# Patient Record
Sex: FEMALE | Race: BLACK OR AFRICAN AMERICAN | Employment: PART TIME | URBAN - METROPOLITAN AREA
[De-identification: names, ages, dates, MRNs, and addresses within clinical notes are randomized per-mention and may not be internally consistent; named-entity substitution may affect disease eponyms.]

---

## 2017-03-20 ENCOUNTER — DOCUMENTATION ONLY (OUTPATIENT)
Dept: INTERNAL MEDICINE CLINIC | Age: 67
End: 2017-03-20

## 2017-03-20 NOTE — PROGRESS NOTES
Medicare Part B Preventive Services  Limitations  Recommendation  Scheduled    Bone Mass Measurement  (age 72 & older, biennial)  Requires diagnosis related to osteoporosis or estrogen deficiency. Biennial benefit unless patient has history of long-term glucocorticoid tx or baseline is needed because initial test was by other method  Completed 5/11/16 and showed osteopenia     Recommended every 2 years  Due 5/2018   Cardiovascular Screening Blood Tests (every 5 years)  Total cholesterol, HDL, Triglycerides  Order as a panel if possible  Completed 9/16    Recommended annually  Due 9/2017   Colorectal Cancer Screening  -Fecal occult blood test (annual)  -Flexible sigmoidoscopy (5y)  -Screening colonoscopy (10y)  -Barium Enema     Completed 4/10/13 with Dr. Jez Yang    Recommended every 5 years   Due 4/2018    Counseling to Prevent Tobacco Use (up to 8 sessions per year)  - Counseling greater than 3 and up to 10 minutes  - Counseling greater than 10 minutes  Patients must be asymptomatic of tobacco-related conditions to receive as preventive service  N/A  N/A    Diabetes Screening Tests (at least every 3 years, Medicare covers annually or at 6-month intervals for prediabetic patients)     Fasting blood sugar (FBS) or glucose tolerance test (GTT)  Patient must be diagnosed with one of the following:  -Hypertension, Dyslipidemia, obesity, previous impaired FBS or GTT  Or any two of the following: overweight, FH of diabetes, age ? 72, history of gestational diabetes, birth of baby weighing more than 9 pounds  Completed a1c 5.3- normal in 9/16    Typically checked annually Due 9/2017   Diabetes Self-Management Training (DSMT) (no USPSTF recommendation)  Requires referral by treating physician for patient with diabetes or renal disease.  10 hours of initial DSMT session of no less than 30 minutes each in a continuous 12-month period.  2 hours of follow-up DSMT in subsequent years.  N/A  N/A    Glaucoma Screening (no USPSTF recommendation)  Diabetes mellitus, family history, , age 48 or over,  American, age 72 or over  Completed 11/2015 Recommended annually  Due now   Human Immunodeficiency Virus (HIV) Screening (annually for increased risk patients)  HIV-1 and HIV-2 by EIA, KAMARI, rapid antibody test, or oral mucosa transudate  Patient must be at increased risk for HIV infection per USPSTF guidelines or pregnant.  Tests covered annually for patients at increased risk.  Pregnant patients may receive up to 3 test during pregnancy.  N/A  N/A    Medical Nutrition Therapy (MNT) (for diabetes or renal disease not recommended schedule)  Requires referral by treating physician for patient with diabetes or renal disease.  Can be provided in same year as diabetes self-management training (DSMT), and CMS recommends medical nutrition therapy take place after DSMT.  Up to 3 hours for initial year and 2 hours in subsequent years.  N/A  N/A    Prostate Cancer Screening (annually up to age 76)  - Digital rectal exam (BENJY)  - Prostate specific antigen (PSA)  Annually (age 48 or over), BENJY not paid separately when covered E/M service is provided on same date  N/A  N/A    Seasonal Influenza Vaccination (annually)     Recommended annually    You declined this vaccine today   Pneumococcal Vaccination (once after 65)     Completed 8/13/2014  Recommended once over the age of 77     [id number]: recommended once over the age of 72 Complete           Due now    Hepatitis B Vaccinations (if medium/high risk)  Medium/high risk factors:  End-stage renal disease,  Hemophiliacs who received Factor VIII or IX concentrates, Clients of institutions for the mentally retarded, Persons who live in the same house as a HepB virus carrier, Homosexual men, Illicit injectable drug abusers.  N/A  N/A    Screening Mammography (biennial age 50-72)?  Annually (age 36 or over)  Completed 3/07/16 at [facility] and negative     Due now; ordered    Screening Pap Tests and Pelvic Examination (up to age 79 and after 79 if unknown history or abnormal study last 10 years)  Every 24 months except high risk  Completed 4/06/16 with  [address]    Recommended every other year  Due 4/2018   Ultrasound Screening for Abdominal Aortic Aneurysm (AAA) (once)  Patient must be referred through IPPE and not have had a screening for abdominal aortic aneurysm before under Medicare.  Limited to patients who meet one of the following criteria:  - Men who are 73-68 years old and have smoked more than 100 cigarettes in their lifetime.  -Anyone with a FH of AAA  -Anyone recommended for screening by USPSTF  N/A  N/A    Family Practice Management 2011

## 2017-03-22 ENCOUNTER — OFFICE VISIT (OUTPATIENT)
Dept: INTERNAL MEDICINE CLINIC | Age: 67
End: 2017-03-22

## 2017-03-22 VITALS
HEIGHT: 60 IN | HEART RATE: 70 BPM | DIASTOLIC BLOOD PRESSURE: 64 MMHG | WEIGHT: 130 LBS | TEMPERATURE: 97.5 F | OXYGEN SATURATION: 97 % | BODY MASS INDEX: 25.52 KG/M2 | SYSTOLIC BLOOD PRESSURE: 104 MMHG

## 2017-03-22 DIAGNOSIS — Z12.39 BREAST SCREENING: ICD-10-CM

## 2017-03-22 DIAGNOSIS — E78.5 DYSLIPIDEMIA: ICD-10-CM

## 2017-03-22 DIAGNOSIS — R73.01 IFG (IMPAIRED FASTING GLUCOSE): ICD-10-CM

## 2017-03-22 DIAGNOSIS — Z13.39 SCREENING FOR ALCOHOLISM: ICD-10-CM

## 2017-03-22 DIAGNOSIS — E55.9 VITAMIN D DEFICIENCY: ICD-10-CM

## 2017-03-22 DIAGNOSIS — Z00.00 ROUTINE GENERAL MEDICAL EXAMINATION AT A HEALTH CARE FACILITY: ICD-10-CM

## 2017-03-22 DIAGNOSIS — M85.80 OSTEOPENIA: ICD-10-CM

## 2017-03-22 DIAGNOSIS — I10 ESSENTIAL HYPERTENSION: Primary | ICD-10-CM

## 2017-03-22 NOTE — PATIENT INSTRUCTIONS
Medicare Part B Preventive Services  Limitations  Recommendation  Scheduled    Bone Mass Measurement  (age 72 & older, biennial)  Requires diagnosis related to osteoporosis or estrogen deficiency. Biennial benefit unless patient has history of long-term glucocorticoid tx or baseline is needed because initial test was by other method  Completed 5/11/16 and showed osteopenia      Recommended every 2 years  Due 5/2018   Cardiovascular Screening Blood Tests (every 5 years)  Total cholesterol, HDL, Triglycerides  Order as a panel if possible  Completed 9/16     Recommended annually  Due 9/2017   Colorectal Cancer Screening  -Fecal occult blood test (annual)  -Flexible sigmoidoscopy (5y)  -Screening colonoscopy (10y)  -Barium Enema     Completed 4/10/13 with Dr. Alex Fink     Recommended every 5 years   Due 4/2018    Counseling to Prevent Tobacco Use (up to 8 sessions per year)  - Counseling greater than 3 and up to 10 minutes  - Counseling greater than 10 minutes  Patients must be asymptomatic of tobacco-related conditions to receive as preventive service  N/A  N/A    Diabetes Screening Tests (at least every 3 years, Medicare covers annually or at 6-month intervals for prediabetic patients)      Fasting blood sugar (FBS) or glucose tolerance test (GTT)  Patient must be diagnosed with one of the following:  -Hypertension, Dyslipidemia, obesity, previous impaired FBS or GTT  Or any two of the following: overweight, FH of diabetes, age ? 72, history of gestational diabetes, birth of baby weighing more than 9 pounds  Completed a1c 5.3- normal in 9/16     Typically checked annually Due 9/2017   Diabetes Self-Management Training (DSMT) (no USPSTF recommendation)  Requires referral by treating physician for patient with diabetes or renal disease.  10 hours of initial DSMT session of no less than 30 minutes each in a continuous 12-month period.  2 hours of follow-up DSMT in subsequent years.  N/A  N/A    Glaucoma Screening (no USPSTF recommendation)  Diabetes mellitus, family history, , age 48 or over,  American, age 72 or over  Completed 2016?  Recommended annually  Due now--please find out information about your last eye exam    Human Immunodeficiency Virus (HIV) Screening (annually for increased risk patients)  HIV-1 and HIV-2 by EIA, KAMARI, rapid antibody test, or oral mucosa transudate  Patient must be at increased risk for HIV infection per USPSTF guidelines or pregnant.  Tests covered annually for patients at increased risk.  Pregnant patients may receive up to 3 test during pregnancy.  N/A  N/A    Medical Nutrition Therapy (MNT) (for diabetes or renal disease not recommended schedule)  Requires referral by treating physician for patient with diabetes or renal disease.  Can be provided in same year as diabetes self-management training (DSMT), and CMS recommends medical nutrition therapy take place after DSMT.  Up to 3 hours for initial year and 2 hours in subsequent years.  N/A  N/A    Prostate Cancer Screening (annually up to age 76)  - Digital rectal exam (BENJY)  - Prostate specific antigen (PSA)  Annually (age 48 or over), BENJY not paid separately when covered E/M service is provided on same date  N/A  N/A    Seasonal Influenza Vaccination (annually)     Recommended annually 9/16 Annually in the fall   Pneumococcal Vaccination (once after 65)     Completed 8/13/2014  Recommended once over the age of 77      [id number]: recommended once over the age of 72 Complete             Declines currently    Hepatitis B Vaccinations (if medium/high risk)  Medium/high risk factors:  End-stage renal disease,  Hemophiliacs who received Factor VIII or IX concentrates, Clients of institutions for the mentally retarded, Persons who live in the same house as a HepB virus carrier, Homosexual men, Illicit injectable drug abusers.  N/A  N/A    Screening Mammography (biennial age 50-74)?  Annually (age 36 or over)  Completed 3/17 at [facility] and negative     Due 3/18;    Screening Pap Tests and Pelvic Examination (up to age 79 and after 79 if unknown history or abnormal study last 8 years)  Every 24 months except high risk  Completed 4/06/16 with Dr. Cristobal Casey     Recommended every other year  Due 4/2018   Ultrasound Screening for Abdominal Aortic Aneurysm (AAA) (once)  Patient must be referred through IPPE and not have had a screening for abdominal aortic aneurysm before under Medicare.  Limited to patients who meet one of the following criteria:  - Men who are 73-68 years old and have smoked more than 100 cigarettes in their lifetime.  -Anyone with a FH of AAA  -Anyone recommended for screening by USPSTF  N/A  N/A    Family Practice Management 2011       Bring in copy of advanced directive

## 2017-03-22 NOTE — PROGRESS NOTES
This is a Subsequent Medicare Annual Wellness Visit providing Personalized Prevention Plan Services (PPPS) (Performed 12 months after initial AWV and PPPS )    I have reviewed the patient's medical history in detail and updated the computerized patient record. History     Past Medical History:   Diagnosis Date    HTN (hypertension) 5/13/2010    Vitiligo 5/13/2010    ALEXUS Burrell      Past Surgical History:   Procedure Laterality Date    ENDOSCOPY, COLON, DIAGNOSTIC  2003    hyperplastic polyps; 10 year repeat; Dr. Evonne Carlos    FOOT/TOES SURGERY PROC UNLISTED      Bone spurs removed R & L foot     HX HEENT  03/2013    cataract removal    Marin Comes    Dr. Shirley Saldana HX TUBAL LIGATION       Current Outpatient Prescriptions   Medication Sig Dispense Refill    amLODIPine-benazepril (LOTREL) 10-20 mg per capsule Take 1 Cap by mouth daily. 90 Cap 3    chlorthalidone (HYGROTEN) 25 mg tablet Take 1 Tab by mouth daily. 90 Tab 3    OTHER,NON-FORMULARY, daily. Vitafusion Women's Multivitamin      CHOLECALCIFEROL, VITAMIN D3, (VITAMIN D3 PO) Take  by mouth daily.        Allergies   Allergen Reactions    Tramadol Nausea Only     Family History   Problem Relation Age of Onset    Hypertension Sister     Stroke Sister 61    Diabetes Sister     Tuberculosis Sister     Stroke Mother 80     TIA    Cancer Father      Lung cancer     Social History   Substance Use Topics    Smoking status: Never Smoker    Smokeless tobacco: Never Used    Alcohol use Yes      Comment: occasional     Patient Active Problem List   Diagnosis Code    HTN (hypertension) I10    Vitiligo L80    Bursitis of shoulder, right M75.51    Symptomatic menopausal or female climacteric states N95.1    ACP (advance care planning) Z71.89    Essential hypertension I10       Depression Risk Factor Screening:     PHQ 2 / 9, over the last two weeks 9/21/2016   Little interest or pleasure in doing things Not at all   Feeling down, depressed or hopeless Not at all   Total Score PHQ 2 0   no depression   Alcohol Risk Factor Screening: On any occasion during the past 3 months, have you had more than 3 drinks containing alcohol? No    Do you average more than 7 drinks per week? No  Rare     Functional Ability and Level of Safety:     Hearing Loss   normal-to-mild    Activities of Daily Living   Self-care. Requires assistance with: no ADLs    Fall Risk     Fall Risk Assessment, last 12 mths 9/21/2016   Able to walk? Yes   Fall in past 12 months? -   no falls  Abuse Screen   Patient is not abused   Lives with ,  recently had stroke had to care for him for a while but he is improving  She is currently working part time. Review of Systems   Not required    Physical Examination     Evaluation of Cognitive Function:  Mood/affect:  neutral  Appearance: age appropriate  Family member/caregiver input: none    No exam performed today, awv. Patient Care Team:  Lester Stockton MD as PCP - General (Internal Medicine)  Carlos Rico MD (Dermatology)  Cata Carrasquillo MD (Gastroenterology)  [facility]ris Kc MD (Gynecology)  Gini Sotomayor RN as Nurse Navigator  Updated list  Advice/Referrals/Counseling   Education and counseling provided:  Are appropriate based on today's review and evaluation  End-of-Life planning (with patient's consent)  Pneumococcal Vaccine  Screening Pap and pelvic (covered once every 2 years)  Cardiovascular screening blood test  Bone mass measurement (DEXA)  Screening for glaucoma  Diabetes screening test      Assessment/Plan       ICD-10-CM ICD-9-CM    1. Routine general medical examination at a health care facility Z00.00 V70.0    2. Screening for alcoholism Z13.89 V79.1    . Discussed with patient about advance medical directive. Provided patient blank AMD and Your Right to Decide Booklet. Requested that if completed to provide a copy of AMD to office.    ACP: SDMs are  and sister, Kenan Jewell    She will bring in completed copy to office        Colonoscopy: 4/10/13, Dr. Radha Toro, repeat 5 years, due 4/18    Pap: Dr. Katie Burns, 4/16, every other year   Mammogram: [facility], completed 3/16/17 per pt, will get report   DEXA: 5/11/16 osteopenia due 5/18    Tdap: 2/10/2016  Pneumovax: 8/13/2014  Kispymw27: declines  Zostavax: declines  Flu shot: 9/21/2016     A1c: 9/16 normal repeat now  Lipids: 9/16 , 3/17 ordered    Hep C screen: 9/16 negative    Eye exam: CLAR[facility]SA TORRES ([phone]), around 12/16? Will get records          Medication reconciliation completed by MA and reviewed by me. Medical/surgical/social/family history reviewed and updated by me. Patient provided AVS and preventative screening table. Patient verbalized understanding of all information discussed.

## 2017-03-22 NOTE — PROGRESS NOTES
HISTORY OF PRESENT ILLNESS  Shelton Chappell is a 78 y.o. female. HPI   Last here 9/21/16. Pt is here to f/u on chronic conditions  Pt is fasting today    BP today is well controlled   BP checked per [facility] running 117/67, 135/69  Continues lotrel 10-20mg and chlorthalidone 25mg daily    Reviewed last labs 9/16  a1c normal, thyroid nl, blood counts nl, kidney nl, liver nl  Repeat labs today, pt is fasting    Wt is overall stable- down 2 lbs since last visit   Her weight is very mildly elevated in overweight ranges    Denies any recent falls at home    Pt follows with Dr. Anna Cardoso (derm) annually   She has f/u pending in 4/17    Continues vit D 2000 units daily OTC  Discussed osteopenia with her and to continue vit D to prevent progression    Denies any hearing loss     Pt notes her  has had two strokes in the fall 2016  She has been caring for him and he has been doing therapy   His condition is improving and she has been able to return to her part-time job     ACP: SDMs are  and sister, Vamsi Walsh  Provided paperwork today to complete at home  She will bring in completed copy to office      PREVENTIVE:    Colonoscopy: 4/10/13, Dr. Vignesh Robles, repeat 5 years, due 4/18  Pap: Dr. Domonique Mendez, 4/16, every other year   Mammogram: [facility], completed 3/16/17 per pt, will get report   DEXA: 5/11/16 osteopenia  Tdap: 2/10/2016  Pneumovax: 8/13/2014  [id number]: declines  Zostavax: declines  Flu shot: 9/21/2016   A1c: 9/16 normal  Eye exam: CLARISSA TORRES ([id number]), around 12/16?   Hep C screen: 9/16 negative  Lipids: 9/16 , 3/17 ordered       Patient Active Problem List    Diagnosis Date Noted    Essential hypertension 04/06/2016    ACP (advance care planning) 02/10/2016    Symptomatic menopausal or female climacteric states 10/25/2013    Bursitis of shoulder, right 01/05/2012    HTN (hypertension) 05/13/2010    Vitiligo 05/13/2010     Current Outpatient Prescriptions   Medication Sig Dispense Refill    amLODIPine-benazepril (LOTREL) 10-20 mg per capsule Take 1 Cap by mouth daily. 90 Cap 3    chlorthalidone (HYGROTEN) 25 mg tablet Take 1 Tab by mouth daily. 90 Tab 3    OTHER,NON-FORMULARY, daily. Vitafusion Women's Multivitamin      CHOLECALCIFEROL, VITAMIN D3, (VITAMIN D3 PO) Take  by mouth daily. Past Surgical History:   Procedure Laterality Date    ENDOSCOPY, COLON, DIAGNOSTIC  2003    hyperplastic polyps; 10 year repeat; Dr. Sriram Rowley    FOOT/TOES SURGERY [address] UNLISTED      Bone spurs removed R & L foot     HX HEENT  03/2013    cataract removal    HX [address] [facility] [place]    HX TUBAL LIGATION        Lab Results  Component Value Date/Time   WBC 4.0 09/21/2016 09:56 AM   HGB 12.5 09/21/2016 09:56 AM   HCT 39.6 09/21/2016 09:56 AM   PLATELET 969 05/77/2867 09:56 AM   MCV 94 09/21/2016 09:56 AM       Lab Results  Component Value Date/Time   Cholesterol, total 180 09/21/2016 09:56 AM   HDL Cholesterol 59 09/21/2016 09:56 AM   LDL, calculated 111 09/21/2016 09:56 AM   Triglyceride 50 09/21/2016 09:56 AM       Lab Results  Component Value Date/Time   GFR est AA 84 09/21/2016 09:56 AM   GFR est non-AA 73 09/21/2016 09:56 AM   Creatinine 0.83 09/21/2016 09:56 AM   BUN 20 09/21/2016 09:56 AM   Sodium 141 09/21/2016 09:56 AM   Potassium 4.0 09/21/2016 09:56 AM   Chloride 99 09/21/2016 09:56 AM   CO2 28 09/21/2016 09:56 AM         Review of Systems   HENT: Negative for hearing loss. Respiratory: Negative for shortness of breath. Cardiovascular: Negative for chest pain. Musculoskeletal: Negative for falls. Psychiatric/Behavioral: Negative for depression. Physical Exam   Constitutional: She is oriented to person, place, and time. She appears well-developed and well-nourished. No distress. HENT:   Head: Normocephalic and atraumatic. Right Ear: External ear normal.   Left Ear: External ear normal.   Mouth/Throat: Oropharynx is clear and moist. No oropharyngeal exudate. Eyes: Conjunctivae and EOM are normal. Right eye exhibits no discharge. Left eye exhibits no discharge. Neck: Normal range of motion. Neck supple. Cardiovascular: Normal rate, regular rhythm, normal heart sounds and intact distal pulses. Exam reveals no gallop and no friction rub. No murmur heard. Pulmonary/Chest: Effort normal and breath sounds normal. No respiratory distress. She has no wheezes. She has no rales. She exhibits no tenderness. Musculoskeletal: Normal range of motion. She exhibits no edema, tenderness or deformity. Lymphadenopathy:     She has no cervical adenopathy. Neurological: She is alert and oriented to person, place, and time. Coordination normal.   Skin: Skin is warm and dry. No rash noted. She is not diaphoretic. No erythema. No pallor. Psychiatric: She has a normal mood and affect. Her behavior is normal.       ASSESSMENT and PLAN    ICD-10-CM ICD-9-CM    1. Essential hypertension    BP well controlled today on lotrel and chlorthalidone, continue no change to dose, check basic labs. I10 401.9 HEMOGLOBIN A1C WITH EAG      METABOLIC PANEL, BASIC      LIPID PANEL      VITAMIN D, 25 HYDROXY   2. Routine general medical examination at a health care facility Z00.00 V70.0 HEMOGLOBIN A1C WITH EAG      METABOLIC PANEL, BASIC      LIPID PANEL      VITAMIN D, 25 HYDROXY   3. Screening for alcoholism    Screen  Z13.89 V79.1 HEMOGLOBIN A1C WITH EAG      METABOLIC PANEL, BASIC      LIPID PANEL      VITAMIN D, 25 HYDROXY   4. Breast screening    Pt reports she completed her mammogram 3/16/17 at [facility], will get report  Z12.39 V76.10 HEMOGLOBIN A1C WITH EAG      METABOLIC PANEL, BASIC      LIPID PANEL      VITAMIN D, 25 HYDROXY      CANCELED: [facility] MAMMO BI SCREENING INCL CAD   5. IFG (impaired fasting glucose)    Check a1c today, ensure stable, last a1c was actually normal R73.01 790.21 HEMOGLOBIN A1C WITH EAG      METABOLIC PANEL, BASIC      LIPID PANEL      VITAMIN D, 25 HYDROXY   6. Dyslipidemia    Diet controlled, pt is fasting, will repeat lipids today. E78.5 272.4 HEMOGLOBIN A1C WITH EAG      METABOLIC PANEL, BASIC      LIPID PANEL      VITAMIN D, 25 HYDROXY   7. Osteopenia    Continues on vit D 2000 units per day, due for DEXA 5/2018 M85.80 733.90 HEMOGLOBIN A1C WITH EAG      METABOLIC PANEL, BASIC      LIPID PANEL      VITAMIN D, 25 HYDROXY   8. Vitamin D deficiency    Check level E55.9 268.9 HEMOGLOBIN A1C WITH EAG      METABOLIC PANEL, BASIC      LIPID PANEL      VITAMIN D, 25 HYDROXY      Depression screen reviewed and negative    Written by Colin Morgan, as dictated by Kathleen Skiff, MD.    Current diagnosis and concerns discussed with pt at length. Understands risks and benefits or current treatment plan and medications and accepts the treatment and medication with any possible risks.   Pt asks appropriate questions which were answered.   Pt instructed to call with any concerns or problems. This note will not be viewable in [address]

## 2017-03-22 NOTE — MR AVS SNAPSHOT
Visit Information Date & Time Provider Department Dept. Phone Encounter #  
 3/22/2017  8:30 AM Jolena Ganser, [address] [place] [address],[address] [phone] [id number] Follow-up Instructions Return in about 6 months (around 9/22/2017). Your Appointments 4/12/2017  9:15 AM  
ROUTINE CARE with Kim Spurling, MD  
[facility] [address]) Appt Note: well woman cp -0 sb 4/6/16  
 [address] [address] [address] [place] [address] [address] [address] Upcoming Health Maintenance Date Due  
 GLAUCOMA SCREENING Q2Y 8/20/2014 MEDICARE YEARLY EXAM 2/10/2017 BREAST CANCER SCRN MAMMOGRAM 3/16/2018 Pneumococcal 65+ Low/Medium Risk (2 of 2 - PPSV23) 8/13/2019 COLONOSCOPY 4/10/2023 DTaP/Tdap/Td series (2 - Td) 2/10/2026 Allergies as of 3/22/2017  Review Complete On: 3/22/2017 By: Praveen Pereira Severity Noted Reaction Type Reactions Tramadol  03/21/2012   Intolerance Nausea Only Current Immunizations  Reviewed on 9/28/2012 Name Date Influenza Vaccine (Quad) PF 9/21/2016 Pneumococcal Polysaccharide (PPSV-23) 8/13/2014 Tdap 2/10/2016 Not reviewed this visit You Were Diagnosed With   
  
 Codes Comments Essential hypertension    -  Primary ICD-10-CM: I10 
ICD-9-CM: 401.9 Routine general medical examination at a health care facility     ICD-10-CM: Z00.00 ICD-9-CM: V70.0 Screening for alcoholism     ICD-10-CM: Z13.89 ICD-9-CM: V79.1 Breast screening     ICD-10-CM: Z12.39 
ICD-9-CM: V76.10 IFG (impaired fasting glucose)     ICD-10-CM: R73.01 
ICD-9-CM: 790.21 Dyslipidemia     ICD-10-CM: E78.5 ICD-9-CM: 272.4 Osteopenia     ICD-10-CM: M85.80 ICD-9-CM: 733.90 Vitamin D deficiency     ICD-10-CM: E55.9 ICD-9-CM: 268.9 Vitals  BP Pulse Temp Height(growth percentile) Weight(growth percentile) LMP  
 104/64 (BP 1 Location: Left arm, BP Patient Position: Sitting) 70 97.5 °F (36.4 °C) (Oral) 5' (1.524 m) 130 lb (59 kg) 06/10/2006 SpO2 BMI OB Status Smoking Status 97% 25.39 kg/m2 Postmenopausal Never Smoker Vitals History BMI and BSA Data Body Mass Index Body Surface Area  
 25.39 kg/m 2 1.58 m 2 Preferred Pharmacy Pharmacy Name Phone [facility] [address] [place] [address] [address] [phone] Your Updated Medication List  
  
   
This list is accurate as of: 3/22/17  8:59 AM.  Always use your most recent med list. amLODIPine-benazepril 10-20 mg per capsule Commonly known as:  Lorita Jerod Take 1 Cap by mouth daily. chlorthalidone 25 mg tablet Commonly known as:  Hilda Carry Take 1 Tab by mouth daily. OTHER(NON-FORMULARY)  
daily. Vitafusion Women's Multivitamin VITAMIN D3 PO Take  by mouth daily. We Performed the Following HEMOGLOBIN A1C WITH EAG [77551 CPT(R)] LIPID PANEL [72035 CPT(R)] METABOLIC PANEL, BASIC [26480 CPT(R)] VITAMIN D, 25 HYDROXY [id number] CPT(R)] Follow-up Instructions Return in about 6 months (around 9/22/2017). Patient Instructions Medicare Part B Preventive Services  Limitations  Recommendation  Scheduled   
Bone Mass Measurement 
(age 72 & older, biennial)  Requires diagnosis related to osteoporosis or estrogen deficiency. Biennial benefit unless patient has history of long-term glucocorticoid tx or baseline is needed because initial test was by other method  Completed 5/11/16 and showed osteopenia  
  
Recommended every 2 years  Due 5/2018 Cardiovascular Screening Blood Tests (every 5 years) Total cholesterol, HDL, Triglycerides  Order as a panel if possible  Completed 9/16 
  
Recommended annually  Due 9/2017 Colorectal Cancer Screening 
-Fecal occult blood test (annual) -Flexible sigmoidoscopy (5y) 
-Screening colonoscopy (10y) -Barium Enema     Completed 4/10/13 with Dr. Jez Yang 
  
Recommended every 5 years   Due 4/2018   
Counseling to Prevent Tobacco Use (up to 8 sessions per year) - Counseling greater than 3 and up to 10 minutes - Counseling greater than 10 minutes  Patients must be asymptomatic of tobacco-related conditions to receive as preventive service  N/A  N/A   
Diabetes Screening Tests (at least every 3 years, Medicare covers annually or at 6-month intervals for prediabetic patients) 
   
Fasting blood sugar (FBS) or glucose tolerance test (GTT)  Patient must be diagnosed with one of the following: 
-Hypertension, Dyslipidemia, obesity, previous impaired FBS or GTT 
Or any two of the following: overweight, FH of diabetes, age ? 72, history of gestational diabetes, birth of baby weighing more than 9 pounds  Completed a1c 5.3- normal in 9/16 
  
Typically checked annually Due 9/2017 Diabetes Self-Management Training (DSMT) (no USPSTF recommendation)  Requires referral by treating physician for patient with diabetes or renal disease. 10 hours of initial DSMT session of no less than 30 minutes each in a continuous 12-month period.  2 hours of follow-up DSMT in subsequent years.  N/A  N/A   
Glaucoma Screening (no USPSTF recommendation)  Diabetes mellitus, family history, , age 48 or over,  American, age 72 or over  Completed 2016? Recommended annually  Due now--please find out information about your last eye exam   
Human Immunodeficiency Virus (HIV) Screening (annually for increased risk patients) HIV-1 and HIV-2 by EIA, KAMARI, rapid antibody test, or oral mucosa transudate  Patient must be at increased risk for HIV infection per USPSTF guidelines or pregnant.  Tests covered annually for patients at increased risk.  Pregnant patients may receive up to 3 test during pregnancy.  N/A  N/A   
Medical Nutrition Therapy (MNT) (for diabetes or renal disease not recommended schedule)  Requires referral by treating physician for patient with diabetes or renal disease.  Can be provided in same year as diabetes self-management training (DSMT), and CMS recommends medical nutrition therapy take place after DSMT.  Up to 3 hours for initial year and 2 hours in subsequent years.  N/A  N/A   
Prostate Cancer Screening (annually up to age 76) - Digital rectal exam (BENJY) - Prostate specific antigen (PSA)  Annually (age 48 or over), BENJY not paid separately when covered E/M service is provided on same date  N/A  N/A   
Seasonal Influenza Vaccination (annually)     Recommended annually 9/16 Annually in the fall Pneumococcal Vaccination (once after 65)     Completed 8/13/2014 Recommended once over the age of 77  
  
[id number]: recommended once over the age of 72 Complete  
   
  
  
Declines currently Hepatitis B Vaccinations (if medium/high risk)  Medium/high risk factors:  End-stage renal disease, Hemophiliacs who received Factor VIII or IX concentrates, Clients of institutions for the mentally retarded, Persons who live in the same house as a HepB virus carrier, Homosexual men, Illicit injectable drug abusers.  N/A  N/A   
Screening Mammography (biennial age 50-72)?  Annually (age 36 or over)  Completed 3/17 at [facility] and negative 
   Due 3/18;   
Screening Pap Tests and Pelvic Examination (up to age 79 and after 79 if unknown history or abnormal study last 8 years)  Every 24 months except high risk  Completed 4/06/16 with  [facility] [place] 
  
Recommended every other year  Due 4/2018 Ultrasound Screening for Abdominal Aortic Aneurysm (AAA) (once)  Patient must be referred through IPPE and not have had a screening for abdominal aortic aneurysm before under Medicare.  Limited to patients who meet one of the following criteria: 
- Men who are 73-68 years old and have smoked more than 100 cigarettes in their lifetime. 
-Anyone with a FH of AAA -Anyone recommended for screening by USPSTF  N/A  N/A   
Family Practice Management 2011  
   
Bring in copy of advanced directive Introducing [facility]! [employer] introduces Mom Trusted patient portal. Now you can access parts of your medical record, email your doctor's office, and request medication refills online. 1. In your internet browser, go to [email or web address]" 2. Click on the First Time User? Click Here link in the Sign In box. You will see the New Member Sign Up page. 3. Enter your Mom Trusted Access Code exactly as it appears below. You will not need to use this code after youve completed the sign-up process. If you do not sign up before the expiration date, you must request a new code. · Mom Trusted Access Code: [email or web address] Expires: 6/20/2017  8:45 AM 
 
4. Enter the last four digits of your Social Security Number (xxxx) and Date of Birth (mm/dd/yyyy) as indicated and click Submit. You will be taken to the next sign-up page. 5. Create a Mom Trusted ID. This will be your Mom Trusted login ID and cannot be changed, so think of one that is secure and easy to remember. 6. Create a Mom Trusted password. You can change your password at any time. 7. Enter your Password Reset Question and Answer. This can be used at a later time if you forget your password. 8. Enter your e-mail address. You will receive e-mail notification when new information is available in [address] 9. Click Sign Up. You can now view and download portions of your medical record. 10. Click the Download Summary menu link to download a portable copy of your medical information. If you have questions, please visit the Frequently Asked Questions section of the Mom Trusted website. Remember, Mom Trusted is NOT to be used for urgent needs. For medical emergencies, dial 911. Now available from your iPhone and Android! Please provide this summary of care documentation to your next provider. Your primary care clinician is listed as Marcell Salvador. If you have any questions after today's visit, please call [phone].

## 2017-03-23 LAB
25(OH)D3+25(OH)D2 SERPL-MCNC: 35 NG/ML (ref 30–100)
BUN SERPL-MCNC: 26 MG/DL (ref 8–27)
BUN/CREAT SERPL: 35 (ref 11–26)
CALCIUM SERPL-MCNC: 9.5 MG/DL (ref 8.7–10.3)
CHLORIDE SERPL-SCNC: 101 MMOL/L (ref 96–106)
CHOLEST SERPL-MCNC: 174 MG/DL (ref 100–199)
CO2 SERPL-SCNC: 26 MMOL/L (ref 18–29)
CREAT SERPL-MCNC: 0.75 MG/DL (ref 0.57–1)
EST. AVERAGE GLUCOSE BLD GHB EST-MCNC: 108 MG/DL
GLUCOSE SERPL-MCNC: 93 MG/DL (ref 65–99)
HBA1C MFR BLD: 5.4 % (ref 4.8–5.6)
HDLC SERPL-MCNC: 58 MG/DL
LDLC SERPL CALC-MCNC: 107 MG/DL (ref 0–99)
POTASSIUM SERPL-SCNC: 3.9 MMOL/L (ref 3.5–5.2)
SODIUM SERPL-SCNC: 142 MMOL/L (ref 134–144)
TRIGL SERPL-MCNC: 46 MG/DL (ref 0–149)
VLDLC SERPL CALC-MCNC: 9 MG/DL (ref 5–40)

## 2017-04-07 RX ORDER — CHLORTHALIDONE 25 MG/1
25 TABLET ORAL DAILY
Qty: 90 TAB | Refills: 1 | Status: SHIPPED | OUTPATIENT
Start: 2017-04-07 | End: 2017-11-14 | Stop reason: SDUPTHER

## 2017-04-07 RX ORDER — AMLODIPINE AND BENAZEPRIL HYDROCHLORIDE 10; 20 MG/1; MG/1
1 CAPSULE ORAL DAILY
Qty: 90 CAP | Refills: 1 | Status: SHIPPED | OUTPATIENT
Start: 2017-04-07 | End: 2017-11-14 | Stop reason: SDUPTHER

## 2017-04-12 ENCOUNTER — HOSPITAL ENCOUNTER (OUTPATIENT)
Dept: LAB | Age: 67
Discharge: HOME | End: 2017-04-12
Payer: MEDICARE

## 2017-04-12 ENCOUNTER — OFFICE VISIT (OUTPATIENT)
Dept: SURGERY | Age: 67
End: 2017-04-12

## 2017-04-12 VITALS
RESPIRATION RATE: 16 BRPM | SYSTOLIC BLOOD PRESSURE: 113 MMHG | WEIGHT: 127.6 LBS | OXYGEN SATURATION: 97 % | TEMPERATURE: 96.7 F | HEART RATE: 63 BPM | BODY MASS INDEX: 25.05 KG/M2 | DIASTOLIC BLOOD PRESSURE: 63 MMHG | HEIGHT: 60 IN

## 2017-04-12 DIAGNOSIS — Z01.419 ENCOUNTER FOR ROUTINE GYNECOLOGICAL EXAMINATION WITH PAPANICOLAOU SMEAR OF CERVIX: Primary | ICD-10-CM

## 2017-04-12 DIAGNOSIS — N95.1 SYMPTOMATIC MENOPAUSAL OR FEMALE CLIMACTERIC STATES: ICD-10-CM

## 2017-04-12 DIAGNOSIS — N95.2 POSTMENOPAUSAL ATROPHIC VAGINITIS: ICD-10-CM

## 2017-04-12 PROCEDURE — 88142 CYTOPATH C/V THIN LAYER: CPT | Performed by: OBSTETRICS & GYNECOLOGY

## 2017-04-12 NOTE — PROGRESS NOTES
SUBJECTIVE: Natalia Mathews is a 78 y.o. female [id number] (Abortions 0, Miscarriages 0), who presents with desire for annual well woman exam. Patient's last menstrual period was 06/10/2006. Allergies   Allergen Reactions    Tramadol Nausea Only        Past Medical History:   Diagnosis Date    HTN (hypertension) 2010    Vitiligo 2010    ALEXUS Al       Past Surgical History:   Procedure Laterality Date    ENDOSCOPY, COLON, DIAGNOSTIC      hyperplastic polyps; 10 year repeat; Dr. Beverley Carranza    FOOT/TOES SURGERY PROC UNLISTED      Bone spurs removed R & L foot     HX HEENT  2013    cataract removal    Marisa Paul HX TUBAL LIGATION         OB History      Para Term  AB TAB SAB Ectopic Multiple Living    3 3                  Family History   Problem Relation Age of Onset    Hypertension Sister     Stroke Sister 61    Diabetes Sister     Tuberculosis Sister     Stroke Mother 80     TIA    Cancer Father      Lung cancer       Social History     Social History    Marital status:      Spouse name: N/A    Number of children: N/A    Years of education: N/A     Occupational History    Not on file. Social History Main Topics    Smoking status: Never Smoker    Smokeless tobacco: Never Used    Alcohol use Yes      Comment: occasional    Drug use: No    Sexual activity: Yes     Partners: Male     Birth control/ protection: Surgical     Other Topics Concern    Not on file     Social History Narrative       Current Outpatient Prescriptions   Medication Sig Dispense Refill    chlorthalidone (HYGROTEN) 25 mg tablet Take 1 Tab by mouth daily. 90 Tab 1    amLODIPine-benazepril (LOTREL) 10-20 mg per capsule Take 1 Cap by mouth daily. 90 Cap 1    OTHER,NON-FORMULARY, daily. Vitafusion Women's Multivitamin      CHOLECALCIFEROL, VITAMIN D3, (VITAMIN D3 PO) Take  by mouth daily.          Review of Systems:   Constitutional: No weight change, chills or fever, anorexia, weakness or sleep disturbance . Cardiovascular: No chest pain, shortness of breath, or palpitations . Respiratory: No cough, shortness of breath, hemoptysis, or orthopnea . Neurologic: No syncope, headaches or seizures . Hematologic: No easy bruising or unusual bleeding . Psychiatric: No insomnia, confusion, depression, or anxiety . GI:No nausea and vomiting, diarrhea or constipation  . : See HPI . Musculoskeletal: No joint pain or muscle pain . Endocrine: No polydipsia, polyuria, cold intolerance, excessive fatigue, or sleep disturbance . Integumentary: No breast pain, lumps, nipple discharge, or axillary lumps . Objective:     Visit Vitals    /63    Pulse 63    Temp 96.7 °F (35.9 °C) (Oral)    Resp 16    Ht 5' (1.524 m)    Wt 127 lb 9.6 oz (57.9 kg)    LMP 06/10/2006    SpO2 97%    BMI 24.92 kg/m2       General:  alert, cooperative, no distress, appears stated age   Skin:  no rash or abnormalities   Eyes: negative   Mouth: MMM no lesions   Lymph Nodes:  Cervical, supraclavicular, and axillary nodes normal.   Breast Exam: normal appearance, no masses or tenderness    Lungs:  clear to auscultation bilaterally   Heart:  regular rate and rhythm   Abdomen: soft, non-tender. Bowel sounds normal. No masses,  no organomegaly   Back:  Costovertebral angle tenderness absent   Genitourinary: Pelvic exam: VULVA: normal appearing vulva with no masses, tenderness or lesions, VAGINA: normal appearing vagina with normal color and discharge, no lesions, CERVIX: normal appearing cervix without discharge or lesions, UTERUS: uterus is normal size, shape, consistency and nontender, ADNEXA: normal adnexa in size, nontender and no masses    Extremities:  extremities normal, atraumatic, no cyanosis or edema   Neurologic:  sensation grossly intact. Psychiatric:  non focal     ASSESSMENT:      ICD-10-CM ICD-9-CM    1.  Encounter for routine gynecological examination with Papanicolaou smear of cervix Z01.419 V72.31 OBTAINING SCREEN PAP SMEAR     V76.2 PAP, LB, RFX HPV [id number])   2. Symptomatic menopausal or female climacteric states N95.1 627.2    3. Postmenopausal atrophic vaginitis N95.2 627.3         Follow-up Disposition:  Return in about 1 year (around 4/12/2018), or if symptoms worsen or fail to improve.

## 2017-04-12 NOTE — MR AVS SNAPSHOT
Visit Information Date & Time Provider Department Dept. Phone Encounter #  
 4/12/2017  9:15 AM Hanane Perez, [address] [facility] [address] [id number] Follow-up Instructions Return in about 1 year (around 4/12/2018), or if symptoms worsen or fail to improve. Your Appointments 9/20/2017  8:45 AM  
ROUTINE CARE with Rubio Recio, [address] [place] [address],[address]) Appt Note: 6 month follow up  
 [facility] [address] [address]  
[address] [facility] [address] [place] [address] Upcoming Health Maintenance Date Due  
 GLAUCOMA SCREENING Q2Y 8/20/2014 BREAST CANCER SCRN MAMMOGRAM 3/16/2018 MEDICARE YEARLY EXAM 3/23/2018 Pneumococcal 65+ Low/Medium Risk (2 of 2 - PPSV23) 8/13/2019 COLONOSCOPY 4/10/2023 DTaP/Tdap/Td series (2 - Td) 2/10/2026 Allergies as of 4/12/2017  Review Complete On: 4/12/2017 By: Hanane Perez MD  
  
 Severity Noted Reaction Type Reactions Tramadol  03/21/2012   Intolerance Nausea Only Current Immunizations  Reviewed on 9/28/2012 Name Date Influenza Vaccine (Quad) PF 9/21/2016 Pneumococcal Polysaccharide (PPSV-23) 8/13/2014 Tdap 2/10/2016 Not reviewed this visit You Were Diagnosed With   
  
 Codes Comments Encounter for routine gynecological examination with Papanicolaou smear of cervix    -  Primary ICD-10-CM: K25.891 ICD-9-CM: V72.31, V76.2 Symptomatic menopausal or female climacteric states     ICD-10-CM: N95.1 ICD-9-CM: 627.2 Postmenopausal atrophic vaginitis     ICD-10-CM: N95.2 ICD-9-CM: 627.3 Vitals BP Pulse Temp Resp Height(growth percentile) Weight(growth percentile) 113/63 63 96.7 °F (35.9 °C) (Oral) 16 5' (1.524 m) 127 lb 9.6 oz (57.9 kg) LMP SpO2 BMI OB Status Smoking Status 06/10/2006 97% 24.92 kg/m2 Postmenopausal Never Smoker Vitals History BMI and BSA Data Body Mass Index Body Surface Area 24.92 kg/m 2 1.57 m 2 Preferred Pharmacy Pharmacy Name Phone [facility] [address] [employer] [phone] Your Updated Medication List  
  
   
This list is accurate as of: 4/12/17 10:08 AM.  Always use your most recent med list. amLODIPine-benazepril 10-20 mg per capsule Commonly known as:  Padmini Navarro Take 1 Cap by mouth daily. chlorthalidone 25 mg tablet Commonly known as:  Cookie Ruder Take 1 Tab by mouth daily. OTHER(NON-FORMULARY)  
daily. Vitafusion Women's Multivitamin VITAMIN D3 PO Take  by mouth daily. We Performed the Following OBTAINING SCREEN PAP SMEAR [ [facility]] PAP, LB, RFX HPV [id number]) [id number] CPT(R)] Follow-up Instructions Return in about 1 year (around 4/12/2018), or if symptoms worsen or fail to improve. Introducing [facility]! [employer] introduces Braclet patient portal. Now you can access parts of your medical record, email your doctor's office, and request medication refills online. 1. In your internet browser, go to [email or web address] 2. Click on the First Time User? Click Here link in the Sign In box. You will see the New Member Sign Up page. 3. Enter your Braclet Access Code exactly as it appears below. You will not need to use this code after youve completed the sign-up process. If you do not sign up before the expiration date, you must request a new code. · Braclet Access Code: [email or web address] Expires: 6/20/2017  8:45 AM 
 
4. Enter the last four digits of your Social Security Number (xxxx) and Date of Birth (mm/dd/yyyy) as indicated and click Submit. You will be taken to the next sign-up page. 5. Create a Braclet ID. This will be your Braclet login ID and cannot be changed, so think of one that is secure and easy to remember. 6. Create a AMDL password. You can change your password at any time. 7. Enter your Password Reset Question and Answer. This can be used at a later time if you forget your password. 8. Enter your e-mail address. You will receive e-mail notification when new information is available in [address] 9. Click Sign Up. You can now view and download portions of your medical record. 10. Click the Download Summary menu link to download a portable copy of your medical information. If you have questions, please visit the Frequently Asked Questions section of the AMDL website. Remember, AMDL is NOT to be used for urgent needs. For medical emergencies, dial 911. Now available from your iPhone and Android! Please provide this summary of care documentation to your next provider. Your primary care clinician is listed as Marco A Sharpe. If you have any questions after today's visit, please call [phone].

## 2017-09-20 ENCOUNTER — OFFICE VISIT (OUTPATIENT)
Dept: INTERNAL MEDICINE CLINIC | Age: 67
End: 2017-09-20

## 2017-09-20 VITALS
HEART RATE: 67 BPM | HEIGHT: 60 IN | WEIGHT: 126 LBS | TEMPERATURE: 98.1 F | SYSTOLIC BLOOD PRESSURE: 103 MMHG | RESPIRATION RATE: 17 BRPM | OXYGEN SATURATION: 98 % | BODY MASS INDEX: 24.74 KG/M2 | DIASTOLIC BLOOD PRESSURE: 64 MMHG

## 2017-09-20 DIAGNOSIS — R05.9 COUGH: Primary | ICD-10-CM

## 2017-09-20 DIAGNOSIS — Z23 ENCOUNTER FOR IMMUNIZATION: ICD-10-CM

## 2017-09-20 DIAGNOSIS — M85.89 OSTEOPENIA OF MULTIPLE SITES: ICD-10-CM

## 2017-09-20 DIAGNOSIS — R00.2 PALPITATION: ICD-10-CM

## 2017-09-20 DIAGNOSIS — E78.5 DYSLIPIDEMIA: ICD-10-CM

## 2017-09-20 DIAGNOSIS — R06.02 SOB (SHORTNESS OF BREATH): ICD-10-CM

## 2017-09-20 DIAGNOSIS — R05.9 COUGH: ICD-10-CM

## 2017-09-20 DIAGNOSIS — I10 ESSENTIAL HYPERTENSION: Primary | ICD-10-CM

## 2017-09-20 NOTE — LETTER
9/20/2017 11:26 AM 
 
Ms. Amarjit Shah [address] [place] [facility] [address] [address] Dear Amarjit Mcfadden: 
 
Please find your most recent results below. Resulted Orders XR CHEST PA LAT Narrative Exam:  2 view chest 
 
Indication: Cough. COMPARISON: None PA and lateral views demonstrate normal heart size. There is no acute process in 
the lung fields. The osseous structures are unremarkable. Impression Impression: No acute process. No pneumonia RECOMMENDATIONS: 
None. Keep up the good work! Please call me if you have any questions: [phone] Sincerely, 
 
 
James Rollins MD

## 2017-09-20 NOTE — PROGRESS NOTES
MOOK per Dr. David Curry, flu vaccine given after obtaining the consent form. 0.5ml injected in the L deltoid muscle intramuscularly. Administered by Tyreejesus Cottrell LPN. VIS given. 9/20/17.

## 2017-09-20 NOTE — MR AVS SNAPSHOT
Visit Information Date & Time Provider Department Dept. Phone Encounter #  
 9/20/2017  8:45 AM Bessie Montemayor, [address] [place] [address],[address] [phone] [id number] Follow-up Instructions Return in about 6 months (around 3/20/2018). Your Appointments 4/18/2018  9:15 AM  
ROUTINE CARE with Juanito Martinez MD  
[facility] [address]) Appt Note: Well Woman $0CP SL 4.12.17  
 [address] [facility]. [address] [phone] [address] [place] [address] [address] [address] Upcoming Health Maintenance Date Due  
 GLAUCOMA SCREENING Q2Y 8/20/2014 INFLUENZA AGE 9 TO ADULT 8/1/2017 BREAST CANCER SCRN MAMMOGRAM 3/16/2018 MEDICARE YEARLY EXAM 3/23/2018 Pneumococcal 65+ Low/Medium Risk (2 of 2 - PPSV23) 8/13/2019 COLONOSCOPY 4/10/2023 DTaP/Tdap/Td series (2 - Td) 2/10/2026 Allergies as of 9/20/2017  Review Complete On: 9/20/2017 By: Bessie Montemayor MD  
  
 Severity Noted Reaction Type Reactions Tramadol  03/21/2012   Intolerance Nausea Only Current Immunizations  Reviewed on 9/28/2012 Name Date Influenza High Dose Vaccine PF 9/20/2017 Influenza Vaccine (Quad) PF 9/21/2016 Pneumococcal Polysaccharide (PPSV-23) 8/13/2014 Tdap 2/10/2016 Not reviewed this visit You Were Diagnosed With   
  
 Codes Comments Essential hypertension    -  Primary ICD-10-CM: I10 
ICD-9-CM: 401.9 Encounter for immunization     ICD-10-CM: K80 ICD-9-CM: V03.89 Osteopenia of multiple sites     ICD-10-CM: M85.89 ICD-9-CM: 733.90 Palpitation     ICD-10-CM: R00.2 ICD-9-CM: 785.1 Dyslipidemia     ICD-10-CM: E78.5 ICD-9-CM: 272.4 Cough     ICD-10-CM: R05 ICD-9-CM: 786.2 SOB (shortness of breath)     ICD-10-CM: R06.02 
ICD-9-CM: 786.05 Vitals BP Pulse Temp Resp Height(growth percentile) Weight(growth percentile) 103/64 (BP 1 Location: Left arm, BP Patient Position: Sitting) 67 98.1 °F (36.7 °C) (Oral) 17 5' (1.524 m) 126 lb (57.2 kg) LMP SpO2 BMI OB Status Smoking Status 06/10/2006 98% 24.61 kg/m2 Postmenopausal Never Smoker Vitals History BMI and BSA Data Body Mass Index Body Surface Area  
 24.61 kg/m 2 1.56 m 2 Preferred Pharmacy Pharmacy Name Phone [facility] [address] [facility] [phone] Your Updated Medication List  
  
   
This list is accurate as of: 9/20/17  9:25 AM.  Always use your most recent med list. amLODIPine-benazepril 10-20 mg per capsule Commonly known as:  Emmalene Gallon Take 1 Cap by mouth daily. chlorthalidone 25 mg tablet Commonly known as:  Lana Cariasz Take 1 Tab by mouth daily. OTHER(NON-FORMULARY)  
daily. Vitafusion Women's Multivitamin VITAMIN D3 PO Take  by mouth daily. We Performed the Following ADMIN INFLUENZA VIRUS VAC [ HCP] AMB POC EKG ROUTINE W/ 12 LEADS, INTER & REP [27520 CPT(R)] CBC W/O DIFF [88269 CPT(R)] INFLUENZA VIRUS VACCINE, HIGH DOSE SEASONAL, PRESERVATIVE FREE [09329 CPT(R)] METABOLIC PANEL, COMPREHENSIVE [84408 CPT(R)] REFERRAL TO CARDIOLOGY [ZZH86 Custom] Comments:  
 Please evaluate patient for new ekg changes, sob T4, FREE [id number] CPT(R)] TSH 3RD GENERATION [44292 CPT(R)] Follow-up Instructions Return in about 6 months (around 3/20/2018). To-Do List   
 09/20/2017 ECHO:  2D ECHO COMPLETE ADULT (TTE) W OR WO CONTR   
  
 09/20/2017 Imaging:  XR CHEST PA LAT Referral Information Referral ID Referred By Referred To  
  
 [id number] Joseline Amin MD   
   [address] [place] [address] [address] [facility] [address] Phone: [phone] Fax: [phone] Visits Status Start Date End Date 1 New Request 9/20/17 9/20/18 If your referral has a status of pending review or denied, additional information will be sent to support the outcome of this decision. Patient Instructions   
zrytec and flonase over the counter to resolve cough Introducing [facility] SERVICES! Leigh Houston introduces Docstoc patient portal. Now you can access parts of your medical record, email your doctor's office, and request medication refills online. 1. In your internet browser, go to https://[employer]. [employer] 2. Click on the First Time User? Click Here link in the Sign In box. You will see the New Member Sign Up page. 3. Enter your Docstoc Access Code exactly as it appears below. You will not need to use this code after youve completed the sign-up process. If you do not sign up before the expiration date, you must request a new code. · Docstoc Access Code: [email or web address] Expires: 12/19/2017  9:14 AM 
 
4. Enter the last four digits of your Social Security Number (xxxx) and Date of Birth (mm/dd/yyyy) as indicated and click Submit. You will be taken to the next sign-up page. 5. Create a Docstoc ID. This will be your Docstoc login ID and cannot be changed, so think of one that is secure and easy to remember. 6. Create a Docstoc password. You can change your password at any time. 7. Enter your Password Reset Question and Answer. This can be used at a later time if you forget your password. 8. Enter your e-mail address. You will receive e-mail notification when new information is available in [address] 9. Click Sign Up. You can now view and download portions of your medical record. 10. Click the Download Summary menu link to download a portable copy of your medical information. If you have questions, please visit the Frequently Asked Questions section of the Docstoc website. Remember, Docstoc is NOT to be used for urgent needs. For medical emergencies, dial 911. Now available from your iPhone and Android! Please provide this summary of care documentation to your next provider. Your primary care clinician is listed as Maria Del Carmen Henriquez. If you have any questions after today's visit, please call [phone].

## 2017-09-20 NOTE — PROGRESS NOTES
HISTORY OF PRESENT ILLNESS  Mari Haque is a 75 y.o. female. HPI   Last here 3/22/17. Pt is here to f/u on chronic conditions.     BP today is 103/64  Continues lotrel 10-20mg and chlorthalidone 25mg daily    Pt c/o palpitations, some SOB and cough x 1-2 weeks  She coughs several times a day  She states that she feels her heart beating in her neck  She states that these palpitations last a few seconds  She does not do any activities to trigger these sx  She states that these sx occur every day  She denies PND  Advised her to try zyrtec and flonase OTC  Will get EKG, ECHO and CXR today     Reviewed last labs 3/17  Repeat labs today     Wt is stable since last visit  Her weight is very mildly elevated in overweight ranges    Pt follows with Dr. Hetal López (derm) annually   Last visit was      Continues vit D 2000 units daily OTC     ACP: SDMs are  and sister, Giuseppe Danielle  Provided paperwork today to complete at home  She will bring in completed copy to office     PREVENTIVE:    Colonoscopy: 4/10/13, Dr. Christina Scott, repeat 5 years, due   Pap: Dr. Octavio Garvin, , every other year   Mammogram: 17  DEXA: 16 osteopenia  Tdap: 2/10/2016  Pneumovax: 2014  Oorgpet72: declines  Zostavax: declines  Flu shot:   A1c:  normal, 3/17 5.4  Eye exam: CLARISSA TORRES ([id number]), around ? Hep C screen:  negative  Lipids: 3/17    EK17 new T inversions in the anterior leads        Patient Active Problem List    Diagnosis Date Noted    Osteopenia 2017    Essential hypertension 2016    ACP (advance care planning) 02/10/2016    Symptomatic menopausal or female climacteric states 10/25/2013    Bursitis of shoulder, right 2012    HTN (hypertension) 2010    Vitiligo 2010     Current Outpatient Prescriptions   Medication Sig Dispense Refill    chlorthalidone (HYGROTEN) 25 mg tablet Take 1 Tab by mouth daily.  90 Tab 1    amLODIPine-benazepril (LOTREL) 10-20 mg per capsule Take 1 Cap by mouth daily. 90 Cap 1    OTHER,NON-FORMULARY, daily. Vitafusion Women's Multivitamin      CHOLECALCIFEROL, VITAMIN D3, (VITAMIN D3 PO) Take  by mouth daily. Past Surgical History:   Procedure Laterality Date    ENDOSCOPY, COLON, DIAGNOSTIC  2003    hyperplastic polyps; 10 year repeat; Dr. Ifeoma Moncada      Bone spurs removed R & L foot     HX HEENT  03/2013    cataract removal    HX LEEP PROCEDURE  1996    Dr. Mitzi Patel    HX TUBAL LIGATION        Lab Results  Component Value Date/Time   WBC 4.0 09/21/2016 09:56 AM   HGB 12.5 09/21/2016 09:56 AM   HCT 39.6 09/21/2016 09:56 AM   PLATELET 730 31/28/9256 09:56 AM   MCV 94 09/21/2016 09:56 AM     Lab Results  Component Value Date/Time   Cholesterol, total 174 03/22/2017 08:53 AM   HDL Cholesterol 58 03/22/2017 08:53 AM   LDL, calculated 107 03/22/2017 08:53 AM   Triglyceride 46 03/22/2017 08:53 AM     Lab Results  Component Value Date/Time   GFR est non-AA 83 03/22/2017 08:53 AM   GFR est AA 95 03/22/2017 08:53 AM   Creatinine 0.75 03/22/2017 08:53 AM   BUN 26 03/22/2017 08:53 AM   Sodium 142 03/22/2017 08:53 AM   Potassium 3.9 03/22/2017 08:53 AM   Chloride 101 03/22/2017 08:53 AM   CO2 26 03/22/2017 08:53 AM          Review of Systems   Respiratory: Positive for cough. Negative for shortness of breath. Cardiovascular: Positive for palpitations. Negative for chest pain. Physical Exam   Constitutional: She is oriented to person, place, and time. She appears well-developed and well-nourished. No distress. HENT:   Head: Normocephalic and atraumatic. Mouth/Throat: Oropharynx is clear and moist.   Eyes: Conjunctivae and EOM are normal. Right eye exhibits no discharge. Left eye exhibits no discharge. Neck: Normal range of motion. Neck supple. No thyromegaly present. Cardiovascular: Normal rate, regular rhythm, normal heart sounds and intact distal pulses.   Exam reveals no gallop and no friction rub. No murmur heard. Pulmonary/Chest: Effort normal and breath sounds normal. No respiratory distress. She has no wheezes. She has no rales. She exhibits no tenderness. Musculoskeletal: Normal range of motion. She exhibits no edema, tenderness or deformity. Lymphadenopathy:     She has no cervical adenopathy. Neurological: She is alert and oriented to person, place, and time. She has normal reflexes. She exhibits normal muscle tone. Coordination normal.   Skin: Skin is warm and dry. No rash noted. She is not diaphoretic. No erythema. No pallor. Psychiatric: She has a normal mood and affect. Her behavior is normal.       ASSESSMENT and PLAN    ICD-10-CM ICD-9-CM    1. Essential hypertension    Well controlled on lotrel and chlorthalidone, continue H90 249.7 METABOLIC PANEL, COMPREHENSIVE      CBC W/O DIFF      TSH 3RD GENERATION      T4, FREE   2. Encounter for immunization    Flu shot today Z23 V03.89 ADMIN INFLUENZA VIRUS VAC      INFLUENZA VIRUS VACCINE, HIGH DOSE SEASONAL, PRESERVATIVE FREE      METABOLIC PANEL, COMPREHENSIVE      CBC W/O DIFF      TSH 3RD GENERATION      T4, FREE   3. Osteopenia of multiple sites    Vit D for tx, due for DEXA next year A55.98 463.98 METABOLIC PANEL, COMPREHENSIVE      CBC W/O DIFF      TSH 3RD GENERATION      T4, FREE   4. Palpitation    Pt has difficult to ascertain sx, she reports palpitations, some SOB and cough, however when further questioned she has significantly difficulty describing her sx, EKG showed some new T inversions in the anterior leads, new from 2015, will have her see cardio to determine if stress test indicated, will get ECHO in the meantime N52.3 228.9 METABOLIC PANEL, COMPREHENSIVE      CBC W/O DIFF      TSH 3RD GENERATION      T4, FREE      AMB POC EKG ROUTINE W/ 12 LEADS, INTER & REP      2D ECHO COMPLETE ADULT (TTE) W OR WO CONTR   5.  Dyslipidemia    Check lipids, diet controled H24.6 402.8 METABOLIC PANEL, COMPREHENSIVE CBC W/O DIFF      TSH 3RD GENERATION      T4, FREE   6. Cough    Check CXR elfego given, she c/o sporadic SOB, maybe allergy induced, discussed zyrtec and flonase   R05 786.2 XR CHEST PA LAT   7. SOB (shortness of breath)    See above R06.02 786.05 REFERRAL TO CARDIOLOGY        Depression screen reviewed and negative. Scribed by Tomeka Sánchez of [address] [place] [address] [address], as dictated by Dr. Rey Larose. Current diagnosis and concerns discussed with pt at length. Pt understands risks and benefits or current treatment plan and medications, and accepts the treatment and medication with any possible risks. Pt asks appropriate questions, which were answered. Pt was instructed to call with any concerns or problems. This note will not be viewable in [address]

## 2017-09-21 LAB
ALBUMIN SERPL-MCNC: 4.1 G/DL (ref 3.6–4.8)
ALBUMIN/GLOB SERPL: 1.6 {RATIO} (ref 1.2–2.2)
ALP SERPL-CCNC: 98 IU/L (ref 39–117)
ALT SERPL-CCNC: 12 IU/L (ref 0–32)
AST SERPL-CCNC: 20 IU/L (ref 0–40)
BILIRUB SERPL-MCNC: 0.5 MG/DL (ref 0–1.2)
BUN SERPL-MCNC: 20 MG/DL (ref 8–27)
BUN/CREAT SERPL: 21 (ref 12–28)
CALCIUM SERPL-MCNC: 9.6 MG/DL (ref 8.7–10.3)
CHLORIDE SERPL-SCNC: 99 MMOL/L (ref 96–106)
CO2 SERPL-SCNC: 27 MMOL/L (ref 18–29)
CREAT SERPL-MCNC: 0.96 MG/DL (ref 0.57–1)
ERYTHROCYTE [DISTWIDTH] IN BLOOD BY AUTOMATED COUNT: 13 % (ref 12.3–15.4)
GLOBULIN SER CALC-MCNC: 2.6 G/DL (ref 1.5–4.5)
GLUCOSE SERPL-MCNC: 83 MG/DL (ref 65–99)
HCT VFR BLD AUTO: 37.5 % (ref 34–46.6)
HGB BLD-MCNC: 12.2 G/DL (ref 11.1–15.9)
MCH RBC QN AUTO: 29.6 PG (ref 26.6–33)
MCHC RBC AUTO-ENTMCNC: 32.5 G/DL (ref 31.5–35.7)
MCV RBC AUTO: 91 FL (ref 79–97)
PLATELET # BLD AUTO: 240 X10E3/UL (ref 150–379)
POTASSIUM SERPL-SCNC: 3.9 MMOL/L (ref 3.5–5.2)
PROT SERPL-MCNC: 6.7 G/DL (ref 6–8.5)
RBC # BLD AUTO: 4.12 X10E6/UL (ref 3.77–5.28)
SODIUM SERPL-SCNC: 140 MMOL/L (ref 134–144)
T4 FREE SERPL-MCNC: 1.36 NG/DL (ref 0.82–1.77)
TSH SERPL DL<=0.005 MIU/L-ACNC: 3.16 UIU/ML (ref 0.45–4.5)
WBC # BLD AUTO: 3.9 X10E3/UL (ref 3.4–10.8)

## 2017-09-28 ENCOUNTER — TELEPHONE (OUTPATIENT)
Dept: INTERNAL MEDICINE CLINIC | Age: 67
End: 2017-09-28

## 2017-09-28 RX ORDER — AMOXICILLIN AND CLAVULANATE POTASSIUM 875; 125 MG/1; MG/1
1 TABLET, FILM COATED ORAL 2 TIMES DAILY
Qty: 14 TAB | Refills: 0 | Status: SHIPPED | OUTPATIENT
Start: 2017-09-28 | End: 2017-10-05

## 2017-09-28 NOTE — TELEPHONE ENCOUNTER
Pt requesting call back regarding cough for which she was seen on 9/20/17  and appears cough has gotten worse.      Howard contact number [phone]       Message received & copied from [facility]

## 2017-09-28 NOTE — TELEPHONE ENCOUNTER
Called, spoke to pt. Two pt identifiers confirmed. Pt informed per Dr. Alayna Atkinson that abx called into local pharmacy. Pt verbalized understanding of information discussed w/ no further questions at this time.

## 2017-10-30 ENCOUNTER — OFFICE VISIT (OUTPATIENT)
Dept: CARDIOLOGY CLINIC | Age: 67
End: 2017-10-30

## 2017-10-30 VITALS
WEIGHT: 129.2 LBS | RESPIRATION RATE: 16 BRPM | HEART RATE: 66 BPM | DIASTOLIC BLOOD PRESSURE: 76 MMHG | HEIGHT: 60 IN | BODY MASS INDEX: 25.36 KG/M2 | OXYGEN SATURATION: 97 % | SYSTOLIC BLOOD PRESSURE: 120 MMHG

## 2017-10-30 DIAGNOSIS — R94.31 ABNORMAL EKG: ICD-10-CM

## 2017-10-30 DIAGNOSIS — I10 ESSENTIAL HYPERTENSION: ICD-10-CM

## 2017-10-30 DIAGNOSIS — R00.2 PALPITATIONS: Primary | ICD-10-CM

## 2017-10-30 NOTE — MR AVS SNAPSHOT
Visit Information Date & Time Provider Department Dept. Phone Encounter #  
 10/30/2017  1:30 PM Agustina Henry, [address] [facility] [phone] [id number] Your Appointments 3/21/2018  8:45 AM  
ROUTINE CARE with Lisa Stanford, [address] [facility] [address]) Appt Note: 6 month follow up  
 [address] [facility]  
[phone]  
  
   
 [address] [facility]  [address] [address]  
  
    
 4/18/2018  9:15 AM  
ROUTINE CARE with Bing Brown MD  
[facility] [address]) Appt Note: Well Woman $0CP SL 4.12.17  
 [address] [address] [address] [place] [address] [address] [place] [address] Upcoming Health Maintenance Date Due  
 GLAUCOMA SCREENING Q2Y 8/20/2014 BREAST CANCER SCRN MAMMOGRAM 3/16/2018 MEDICARE YEARLY EXAM 3/23/2018 Pneumococcal 65+ Low/Medium Risk (2 of 2 - PPSV23) 8/13/2019 COLONOSCOPY 4/10/2023 DTaP/Tdap/Td series (2 - Td) 2/10/2026 Allergies as of 10/30/2017  Review Complete On: 10/30/2017 By: Agustina Henry MD  
  
 Severity Noted Reaction Type Reactions Tramadol  03/21/2012   Intolerance Nausea Only Current Immunizations  Reviewed on 9/28/2012 Name Date Influenza High Dose Vaccine PF 9/20/2017 Influenza Vaccine (Quad) PF 9/21/2016 Pneumococcal Polysaccharide (PPSV-23) 8/13/2014 Tdap 2/10/2016 Not reviewed this visit You Were Diagnosed With   
  
 Codes Comments Essential hypertension    -  Primary ICD-10-CM: I10 
ICD-9-CM: 401.9 Palpitations     ICD-10-CM: R00.2 ICD-9-CM: 785.1 Vitals BP Pulse Resp Height(growth percentile) Weight(growth percentile) LMP  
 120/76 (BP 1 Location: Right arm, BP Patient Position: Sitting) 66 16 5' (1.524 m) 129 lb 3.2 oz (58.6 kg) 06/10/2006 SpO2 BMI OB Status Smoking Status 97% 25.23 kg/m2 Postmenopausal Never Smoker Vitals History BMI and BSA Data Body Mass Index Body Surface Area  
 25.23 kg/m 2 1.58 m 2 Preferred Pharmacy Pharmacy Name Phone [facility] [address] [facility] [phone] Your Updated Medication List  
  
   
This list is accurate as of: 10/30/17  2:04 PM.  Always use your most recent med list. amLODIPine-benazepril 10-20 mg per capsule Commonly known as:  Royal Pilar Take 1 Cap by mouth daily. chlorthalidone 25 mg tablet Commonly known as:  Lexus Mend Take 1 Tab by mouth daily. OTHER(NON-FORMULARY)  
daily. Vitafusion Women's Multivitamin VITAMIN D3 PO Take  by mouth daily. We Performed the Following AMB POC EKG ROUTINE W/ 12 LEADS, INTER & REP [67223 CPT(R)] Introducing [facility] & HEALTH SERVICES! Ambrose Colon introduces [employer]Play patient portal. Now you can access parts of your medical record, email your doctor's office, and request medication refills online. 1. In your internet browser, go to [email or web address]. [employer] 2. Click on the First Time User? Click Here link in the Sign In box. You will see the New Member Sign Up page. 3. Enter your CausePlay Access Code exactly as it appears below. You will not need to use this code after youve completed the sign-up process. If you do not sign up before the expiration date, you must request a new code. · CausePlay Access Code: [email or web address] Expires: 12/19/2017  9:14 AM 
 
4. Enter the last four digits of your Social Security Number (xxxx) and Date of Birth (mm/dd/yyyy) as indicated and click Submit. You will be taken to the next sign-up page. 5. Create a [employer]Baset ID. This will be your CausePlay login ID and cannot be changed, so think of one that is secure and easy to remember. 6. Create a ScaleBaset password. You can change your password at any time. 7. Enter your Password Reset Question and Answer. This can be used at a later time if you forget your password. 8. Enter your e-mail address. You will receive e-mail notification when new information is available in [address] 9. Click Sign Up. You can now view and download portions of your medical record. 10. Click the Download Summary menu link to download a portable copy of your medical information. If you have questions, please visit the Frequently Asked Questions section of the [employer] website. Remember, [employer] is NOT to be used for urgent needs. For medical emergencies, dial 911. Now available from your iPhone and Android! Please provide this summary of care documentation to your next provider. Your primary care clinician is listed as Griselda Simmer. If you have any questions after today's visit, please call [phone].

## 2017-10-30 NOTE — PROGRESS NOTES
NAME:  Christoph Fenton   :   1950   MRN:   [id number]   PCP:  Mahnaz Cho MD           Subjective: The patient is a 75y.o. year old female  who presents for a new patient evalation for the following: palpitations. Patient reports a heart pounding sensation that takes her breath and makes her cough. This improved after she completed a course of anti-biotics. She notices it rarely now. Denies chest pain, pressure, tightness, RAGLAND, dizziness, edema or syncope. Hx of HTN. Patient Active Problem List   Diagnosis Code    HTN (hypertension) I10    Vitiligo L80    Bursitis of shoulder, right M75.51    Symptomatic menopausal or female climacteric states N95.1    ACP (advance care planning) Z71.89    Essential hypertension I10    Osteopenia M85.80       Past Medical History:   Diagnosis Date    HTN (hypertension) 2010    Vitiligo 2010    ALEXUS Longo       Social History   Substance Use Topics    Smoking status: Never Smoker    Smokeless tobacco: Never Used    Alcohol use Yes      Comment: occasional      Family History   Problem Relation Age of Onset    Hypertension Sister     Stroke Sister 61    Diabetes Sister     Tuberculosis Sister     Stroke Mother 80     TIA    Cancer Father      Lung cancer        Review of Systems  Constitutional: Negative for fever, chills, and diaphoresis. Respiratory: Negative for cough, hemoptysis, sputum production, shortness of breath and wheezing. Cardiovascular: Negative for chest pain, palpitations, orthopnea, claudication, leg swelling and PND. Gastrointestinal: Negative for heartburn, nausea, vomiting, blood in stool and melena. Genitourinary: Negative for dysuria and flank pain. Musculoskeletal: Negative for joint pain and back pain. Skin: Negative for rash. Neurological: Negative for focal weakness, seizures, loss of consciousness, weakness and headaches.    Endo/Heme/Allergies: Does not bruise/bleed easily. Psychiatric/Behavioral: Negative for memory loss. The patient does not have insomnia. Objective:       Vitals:    10/30/17 1303 10/30/17 1315   BP: 120/72 120/76   Pulse: 66    Resp: 16    SpO2: 97%    Weight: 129 lb 3.2 oz (58.6 kg)    Height: 5' (1.524 m)     Body mass index is 25.23 kg/(m^2). General PE    Gen: NAD     Mental Status - Alert. General Appearance - Not in acute distress. Neck - no JVD     Chest and Lung Exam     Inspection: Accessory muscles - No use of accessory muscles in breathing. Auscultation:   Breath sounds: - Normal.     Cardiovascular   Inspection: Jugular vein - Bilateral - Inspection Normal.   Palpation/Percussion:   Apical Impulse: - Normal.   Auscultation: Rhythm - Regular. Heart Sounds - S1 WNL and S2 WNL. No S3 or S4. Murmurs & Other Heart Sounds: Auscultation of the heart reveals - No Murmurs. Peripheral Vascular   Upper Extremity: Inspection - Bilateral - No Cyanotic nailbeds or Digital clubbing. Lower Extremity:   Palpation: Edema - Bilateral - No edema. Abdomen: Soft, non-tender, bowel sounds are active. Neuro: A&O times 3, CN and motor grossly WNL      Data Review:     EKG - Sinus  Bradycardia   -  Nonspecific T-abnormality. LABS-   Lab Results   Component Value Date/Time    Cholesterol, total 174 03/22/2017 08:53 AM    HDL Cholesterol 58 03/22/2017 08:53 AM    LDL, calculated 107 03/22/2017 08:53 AM    VLDL, calculated 9 03/22/2017 08:53 AM    Triglyceride 46 03/22/2017 08:53 AM       Allergies reviewed  Allergies   Allergen Reactions    Tramadol Nausea Only       Medications reviewed  Current Outpatient Prescriptions   Medication Sig    chlorthalidone (HYGROTEN) 25 mg tablet Take 1 Tab by mouth daily.  amLODIPine-benazepril (LOTREL) 10-20 mg per capsule Take 1 Cap by mouth daily.  OTHER,NON-FORMULARY, daily. Vitafusion Women's Multivitamin    CHOLECALCIFEROL, VITAMIN D3, (VITAMIN D3 PO) Take  by mouth daily.      No current facility-administered medications for this visit. Assessment:       ICD-10-CM ICD-9-CM    1. Palpitations R00.2 785. 1 2D ECHO COMPLETE ADULT (TTE) W OR WO CONTR   2. Essential hypertension I10 401.9 AMB POC EKG ROUTINE W/ 12 LEADS, INTER & REP      2D ECHO COMPLETE ADULT (TTE) W OR WO CONTR   3. Abnormal EKG R94.31 794.31 2D ECHO COMPLETE ADULT (TTE) W OR WO CONTR        Orders Placed This Encounter    AMB POC EKG ROUTINE W/ 12 LEADS, INTER & REP     Order Specific Question:   Reason for Exam:     Answer:   routine    2D ECHO COMPLETE ADULT (TTE) W OR WO CONTR     Standing Status:   Future     Standing Expiration Date:   4/30/2018     Order Specific Question:   Reason for Exam:     Answer:   HTN, palpitations     Order Specific Question:   Contrast Enhancement (Bubble Study, Definity, Optison) may be used if criteria listed in established evidence-based protocol has been identified. Answer:   Yes     Plan:     Patient presents for new patient evaluation with complaints of palpitations that are improving since she completed ABX. Hx of HTN. EKG with atrial enlargement, NS changes. No ischemic symptoms. Will get echo. Advised that if there are recurrent palpitations of concern, we have 24 hour and 2 week event monitors available. If echo is normal, she will follow-up as needed.     Aimee Armstrong MD

## 2017-11-09 ENCOUNTER — CLINICAL SUPPORT (OUTPATIENT)
Dept: CARDIOLOGY CLINIC | Age: 67
End: 2017-11-09

## 2017-11-09 DIAGNOSIS — I10 ESSENTIAL HYPERTENSION: ICD-10-CM

## 2017-11-09 DIAGNOSIS — R94.31 ABNORMAL EKG: ICD-10-CM

## 2017-11-09 DIAGNOSIS — R00.2 PALPITATIONS: ICD-10-CM

## 2017-11-14 NOTE — TELEPHONE ENCOUNTER
Patient needs to get a refill of her Rx for her blood pressure medicine. Chlorthalidone and Amlodipine are both sent to her mail order pharmacy and she has zero refills remaining. Best contact number is [phone].        Message received & copied from [facility]

## 2017-11-20 RX ORDER — AMLODIPINE AND BENAZEPRIL HYDROCHLORIDE 10; 20 MG/1; MG/1
1 CAPSULE ORAL DAILY
Qty: 90 CAP | Refills: 1 | Status: CANCELLED | OUTPATIENT
Start: 2017-11-20

## 2017-11-20 RX ORDER — CHLORTHALIDONE 25 MG/1
25 TABLET ORAL DAILY
Qty: 90 TAB | Refills: 1 | Status: CANCELLED | OUTPATIENT
Start: 2017-11-20

## 2017-11-20 RX ORDER — CHLORTHALIDONE 25 MG/1
25 TABLET ORAL DAILY
Qty: 90 TAB | Refills: 1 | Status: SHIPPED | OUTPATIENT
Start: 2017-11-20 | End: 2017-11-29 | Stop reason: SDUPTHER

## 2017-11-20 RX ORDER — AMLODIPINE AND BENAZEPRIL HYDROCHLORIDE 10; 20 MG/1; MG/1
1 CAPSULE ORAL DAILY
Qty: 90 CAP | Refills: 1 | Status: SHIPPED | OUTPATIENT
Start: 2017-11-20 | End: 2017-11-29 | Stop reason: SDUPTHER

## 2017-11-20 NOTE — TELEPHONE ENCOUNTER
Pt is requesting refill on (Chlortalidone 25mg) and (Amlodipine 20mg) to pharmacy on file.  Pt best contact [address]) [phone]   home ([phone].        Message received & copied from [facility]

## 2017-11-20 NOTE — TELEPHONE ENCOUNTER
Patient needs a call back at number attached today before 5 pm to advise on status of refill request we show received on 11/14/17 & is still pending that is to have been sent to [facility]. Please call to advise.  Thank you

## 2017-11-20 NOTE — TELEPHONE ENCOUNTER
Called, spoke to pt. Two pt identifiers confirmed. Pt informed Dr. Deepa Abdullahi has refilled meds and sent to mail order. Pt verbalized understanding of information discussed w/ no further questions at this time.

## 2017-11-22 ENCOUNTER — TELEPHONE (OUTPATIENT)
Dept: INTERNAL MEDICINE CLINIC | Age: 67
End: 2017-11-22

## 2017-11-22 NOTE — TELEPHONE ENCOUNTER
----- Message from Brad Marin sent at 11/22/2017 11:00 AM EST -----  Regarding: Dr. Shahram Weiss  Pt would like to have prescription for \"amlodipine\" 10-20 mg and \"chlorthalidone\" 25mg. She stated it has been 2 weeks with numerous attempts to the office. Pt is still waiting to hear status of her medication. The best contact number is [phone].       Message copied/pasted from [facility]

## 2017-11-22 NOTE — TELEPHONE ENCOUNTER
Called patient. Left detailed message for patient advising prescriptions were sent to [facility] order pharmacy on 11/20/17. She should call pharmacy to request status. Requested return call with further questions or concerns.

## 2017-11-29 RX ORDER — AMLODIPINE AND BENAZEPRIL HYDROCHLORIDE 10; 20 MG/1; MG/1
1 CAPSULE ORAL DAILY
Qty: 30 CAP | Refills: 0 | Status: SHIPPED | OUTPATIENT
Start: 2017-11-29 | End: 2018-06-21 | Stop reason: SDUPTHER

## 2017-11-29 RX ORDER — CHLORTHALIDONE 25 MG/1
25 TABLET ORAL DAILY
Qty: 30 TAB | Refills: 0 | Status: SHIPPED | OUTPATIENT
Start: 2017-11-29 | End: 2018-06-21 | Stop reason: SDUPTHER

## 2017-11-29 NOTE — TELEPHONE ENCOUNTER
Pt is requesting some \"Chlorthalidone\" and \"Amlodipine 20mg\" [address] [phone] until she receive her medication in the mail because she ran out and it will be a few days before she gets it. Best contact number is  [phone].        Message received & copied from [facility]

## 2017-11-29 NOTE — TELEPHONE ENCOUNTER
#[phone] pt states she has been without this medication for 3 days and needs this called into [facility] on file. Pt states she requested earlier today. She states she only needs 5-6 days unitl the script gets to her. She states this wasn't sent until 11-28-17?

## 2017-11-30 ENCOUNTER — TELEPHONE (OUTPATIENT)
Dept: INTERNAL MEDICINE CLINIC | Age: 67
End: 2017-11-30

## 2017-11-30 NOTE — TELEPHONE ENCOUNTER
Called, spoke to pt. Two pt identifiers confirmed. Pt states that local pharmacy will not let her  emergent script for the mail order is on its way. Pt states that she was told by [facility] that it was to be shipped out 11/21/17 but then she was told, it got shipped out 11/28/17. Pt states she is waiting for the mail to come.

## 2017-11-30 NOTE — TELEPHONE ENCOUNTER
Left vm for pt that both scripts sent locally as of 11/29/17 and both were sent to mail order as of 11/20/17. Callback prn.

## 2017-11-30 NOTE — TELEPHONE ENCOUNTER
----- Message from Renetta Silveramn sent at 11/29/2017  5:02 PM EST -----  Regarding: Dr. Lincoln Baxter  Pt is requesting a refill for Chlorthalidone 25mg and Amlodipine/Benazepril 20mg. Pt stated she has been trying to get these refills for the past 2 weeks. She called her pharmacy and they are waiting for the Rx to be signed. Pt also stated she has not had her medication in 3 days. Best contact number [phone].     Message copied/pasted from [facility]

## 2017-11-30 NOTE — TELEPHONE ENCOUNTER
#[phone] pt states she can't get script at [facility] as it is coming in the mail? Pt states she has gone through this for 2 weeks now. Please call pt to work out. Thanks.

## 2018-03-20 ENCOUNTER — DOCUMENTATION ONLY (OUTPATIENT)
Dept: INTERNAL MEDICINE CLINIC | Age: 68
End: 2018-03-20

## 2018-03-20 NOTE — PROGRESS NOTES
Medicare Part B Preventive Services  Limitations  Recommendation  Scheduled    Bone Mass Measurement  (age 72 & older, biennial)  Requires diagnosis related to osteoporosis or estrogen deficiency. Biennial benefit unless patient has history of long-term glucocorticoid tx or baseline is needed because initial test was by other method  Completed 5/11/16      Recommended every 2 years  Due 5/2018   Cardiovascular Screening Blood Tests (every 5 years)  Total cholesterol, HDL, Triglycerides  Order as a panel if possible  Completed 3/2017      Recommended annually  Due 3/2018   Colorectal Cancer Screening  -Fecal occult blood test (annual)  -Flexible sigmoidoscopy (5y)  -Screening colonoscopy (10y)  -Barium Enema     Completed 4/10/13 with Dr. Pancho Bell  Recommended every 5 years   Due 4/2018    Counseling to Prevent Tobacco Use (up to 8 sessions per year)  - Counseling greater than 3 and up to 10 minutes  - Counseling greater than 10 minutes  Patients must be asymptomatic of tobacco-related conditions to receive as preventive service  N/A  N/A    Diabetes Screening Tests (at least every 3 years, Medicare covers annually or at 6-month intervals for prediabetic patients)      Fasting blood sugar (FBS) or glucose tolerance test (GTT)  Patient must be diagnosed with one of the following:  -Hypertension, Dyslipidemia, obesity, previous impaired FBS or GTT  Or any two of the following: overweight, FH of diabetes, age ? 72, history of gestational diabetes, birth of baby weighing more than 9 pounds  Completed 3/2017 with A1C 5.4      Typically checked annually Due 3/2018   Diabetes Self-Management Training (DSMT) (no USPSTF recommendation)  Requires referral by treating physician for patient with diabetes or renal disease.  10 hours of initial DSMT session of no less than 30 minutes each in a continuous 12-month period.  2 hours of follow-up DSMT in subsequent years.  N/A  N/A    Glaucoma Screening (no USPSTF recommendation)  Diabetes mellitus, family history, , age 48 or over,  American, age 72 or over  Completed 12/2016    Recommended annually  Due now    Human Immunodeficiency Virus (HIV) Screening (annually for increased risk patients)  HIV-1 and HIV-2 by EIA, KAMARI, rapid antibody test, or oral mucosa transudate  Patient must be at increased risk for HIV infection per USPSTF guidelines or pregnant.  Tests covered annually for patients at increased risk.  Pregnant patients may receive up to 3 test during pregnancy.  N/A  N/A    Medical Nutrition Therapy (MNT) (for diabetes or renal disease not recommended schedule)  Requires referral by treating physician for patient with diabetes or renal disease.  Can be provided in same year as diabetes self-management training (DSMT), and CMS recommends medical nutrition therapy take place after DSMT.  Up to 3 hours for initial year and 2 hours in subsequent years.  N/A  N/A    Prostate Cancer Screening (annually up to age 76)  - Digital rectal exam (BENJY)  - Prostate specific antigen (PSA)  Annually (age 48 or over), BENJY not paid separately when covered E/M service is provided on same date  N/A  N/A    Seasonal Influenza Vaccination (annually)     Completed 9/2017    Recommended annually  Due Fall 2018   Pneumococcal Vaccination (once after 65)     Pneumovax - 8/13/2014      [id number] - never completed     Both recommended once over the age of 72 Completed           Declines    Hepatitis B Vaccinations (if medium/high risk)  Medium/high risk factors:  End-stage renal disease,  Hemophiliacs who received Factor VIII or IX concentrates, Clients of institutions for the mentally retarded, Persons who live in the same house as a HepB virus carrier, Homosexual men, Illicit injectable drug abusers.  N/A  N/A    Screening Mammography (biennial age 50-74)?  Annually (age 36 or over)  Completed 4/2017    Recommended annually     Due 4/2018   Screening Pap Tests and Pelvic Examination (up to age 79 and after 79 if unknown history or abnormal study last 8 years)  Every 24 months except high risk  Completed 4/06/16 with Dr. Alex Snowden  Recommended every other year  Due 4/2018   Ultrasound Screening for Abdominal Aortic Aneurysm (AAA) (once)  Patient must be referred through IPPE and not have had a screening for abdominal aortic aneurysm before under Medicare.  Limited to patients who meet one of the following criteria:  - Men who are 73-68 years old and have smoked more than 100 cigarettes in their lifetime.  -Anyone with a FH of AAA  -Anyone recommended for screening by USPSTF  N/A  N/A

## 2018-03-21 ENCOUNTER — TELEPHONE (OUTPATIENT)
Dept: INTERNAL MEDICINE CLINIC | Age: 68
End: 2018-03-21

## 2018-03-21 NOTE — TELEPHONE ENCOUNTER
Called, spoke to pt. Two pt identifiers confirmed. Pt offered but declines appt 3/22/18 d/t work. Pt offered and accepted appt for 3/28/18 1145. Pt verbalized understanding of information discussed w/ no further questions at this time.

## 2018-03-28 ENCOUNTER — OFFICE VISIT (OUTPATIENT)
Dept: INTERNAL MEDICINE CLINIC | Age: 68
End: 2018-03-28

## 2018-03-28 VITALS
DIASTOLIC BLOOD PRESSURE: 66 MMHG | HEIGHT: 60 IN | OXYGEN SATURATION: 100 % | RESPIRATION RATE: 16 BRPM | TEMPERATURE: 97.7 F | HEART RATE: 68 BPM | BODY MASS INDEX: 25.72 KG/M2 | SYSTOLIC BLOOD PRESSURE: 111 MMHG | WEIGHT: 131 LBS

## 2018-03-28 DIAGNOSIS — E66.3 OVERWEIGHT: ICD-10-CM

## 2018-03-28 DIAGNOSIS — Z00.00 MEDICARE ANNUAL WELLNESS VISIT, SUBSEQUENT: ICD-10-CM

## 2018-03-28 DIAGNOSIS — I10 ESSENTIAL HYPERTENSION: Primary | ICD-10-CM

## 2018-03-28 DIAGNOSIS — E78.5 DYSLIPIDEMIA: ICD-10-CM

## 2018-03-28 DIAGNOSIS — M85.89 OSTEOPENIA OF MULTIPLE SITES: ICD-10-CM

## 2018-03-28 NOTE — ACP (ADVANCE CARE PLANNING)
ACP planning begun today, SDM is.   [address] , peter virgen sister--gave info lov has completed and will bring in

## 2018-03-28 NOTE — PROGRESS NOTES
This is the Subsequent Medicare Annual Wellness Exam, performed 12 months or more after the Initial AWV or the last Subsequent AWV    I have reviewed the patient's medical history in detail and updated the computerized patient record. History     Past Medical History:   Diagnosis Date    HTN (hypertension) 5/13/2010    Vitiligo 5/13/2010    ALEXUS Mason      Past Surgical History:   Procedure Laterality Date    ENDOSCOPY, COLON, DIAGNOSTIC  2003    hyperplastic polyps; 10 year repeat; Dr. Farhana Bauer    FOOT/TOES SURGERY PROC UNLISTED      Bone spurs removed R & L foot     HX HEENT  03/2013    cataract removal    Shell Carroll Batter HX TUBAL LIGATION       Current Outpatient Prescriptions   Medication Sig Dispense Refill    chlorthalidone (HYGROTEN) 25 mg tablet Take 1 Tab by mouth daily. 30 Tab 0    amLODIPine-benazepril (LOTREL) 10-20 mg per capsule Take 1 Cap by mouth daily. 30 Cap 0    OTHER,NON-FORMULARY, daily. Vitafusion Women's Multivitamin      CHOLECALCIFEROL, VITAMIN D3, (VITAMIN D3 PO) Take  by mouth daily.        Allergies   Allergen Reactions    Tramadol Nausea Only     Family History   Problem Relation Age of Onset    Hypertension Sister     Stroke Sister 61    Diabetes Sister     Tuberculosis Sister     Stroke Mother 80     TIA    Cancer Father      Lung cancer     Social History   Substance Use Topics    Smoking status: Never Smoker    Smokeless tobacco: Never Used    Alcohol use Yes      Comment: occasional     Patient Active Problem List   Diagnosis Code    HTN (hypertension) I10    Vitiligo L80    Bursitis of shoulder, right M75.51    Symptomatic menopausal or female climacteric states N95.1    ACP (advance care planning) Z71.89    Essential hypertension I10    Osteopenia M85.80       Depression Risk Factor Screening:     PHQ over the last two weeks 3/28/2018   Little interest or pleasure in doing things Not at all   Feeling down, depressed or hopeless Not at all   Total Score PHQ 2 0     Alcohol Risk Factor Screening: You do not drink alcohol or very rarely. Functional Ability and Level of Safety:   Hearing Loss  Hearing is good. Activities of Daily Living  The home contains: no safety equipment. Patient does total self care    Fall Risk  Fall Risk Assessment, last 12 mths 3/28/2018   Able to walk? Yes   Fall in past 12 months? No       Abuse Screen  Patient is not abused  Lives with  safe  Cognitive Screening   Evaluation of Cognitive Function:  Has your family/caregiver stated any concerns about your memory: no  Normal    Patient Care Team   Patient Care Team:  Marino Neumann MD as PCP - General (Internal Medicine)  Reese Basurto MD (Dermatology)  Mariusz Goldberg MD (Gastroenterology)  Jeffy Tan MD (Gynecology)  Jordana Padron RN as Nurse Navigator  Marie Bauer MD (Cardiology)  upated  Assessment/Plan   Education and counseling provided:  Are appropriate based on today's review and evaluation  End-of-Life planning (with patient's consent)  Screening Mammography  Screening Pap and pelvic (covered once every 2 years)  Colorectal cancer screening tests  Cardiovascular screening blood test  Bone mass measurement (DEXA)  Screening for glaucoma  Diabetes screening test    Diagnoses and all orders for this visit:    1. Essential hypertension    2. Medicare annual wellness visit, subsequent        Health Maintenance Due   Topic Date Due    GLAUCOMA SCREENING Q2Y  08/20/2014    BREAST CANCER SCRN MAMMOGRAM  03/16/2018    MEDICARE YEARLY EXAM  03/23/2018     Discussed with patient about advance medical directive. Provided patient blank AMD and Your Right to Decide Booklet. Requested that if completed to provide a copy of AMD to office. ACP not on file. SDMs are [employer]  () or Destiny Vargas (sister). Provided paperwork to complete at home.   She will bring in completed copy to office.        Colonoscopy: 4/10/13, Dr. Kelley Abreu, repeat 5 years, due , scheduled for 18  Pap: Dr. Ronald Nelson, , every other year   Mammogram: [facility], 3/16/17, negative, scheduled for 3/29/18  DEXA: 16, osteopenia, ordered due     Tdap: 2/10/2016  Pneumovax: 2014  [id number]: declines  Zostavax: declines  Shingrix: declines  Flu shot:     Eye exam: CLARISSA TORRES ([phone]), 2017?, scheduled for 18    A1c: 3/17 5.4  Due now  Lipids: 3/17   Annual   Hep C screen: , negative    EK17, new T inversions in the anterior leads--saw cardiology       Medication reconciliation completed by MA and reviewed by me. Medical/surgical/social/family history reviewed and updated by me. Patient provided AVS and preventative screening table. Patient verbalized understanding of all information discussed.

## 2018-03-28 NOTE — PATIENT INSTRUCTIONS
Medicare Wellness Visit, Female    The best way to live healthy is to have a healthy lifestyle by eating a well-balanced diet, exercising regularly, limiting alcohol and stopping smoking. Regular physical exams and screening tests are another way to keep healthy. Preventive exams provided by your health care provider can find health problems before they become diseases or illnesses. Preventive services including immunizations, screening tests, monitoring and exams can help you take care of your own health. All people over age 72 should have a pneumovax  and and a prevnar shot to prevent pneumonia. These are once in a lifetime unless you and your provider decide differently. All people over 65 should have a yearly flu shot and a tetanus vaccine every 10 years. A bone mass density to screen for osteoporosis or thinning of the bones should be done every 2 years after 65. Screening for diabetes mellitus with a blood sugar test should be done every year. Glaucoma is a disease of the eye due to increased ocular pressure that can lead to blindness and it should be done every year by an eye professional.    Cardiovascular screening tests that check for elevated lipids (fatty part of blood) which can lead to heart disease and strokes should be done every 5 years. Colorectal screening that evaluates for blood or polyps in your colon should be done yearly as a stool test or every five years as a flexible sigmoidoscope or every 10 years as a colonoscopy up to age 76. Breast cancer screening with a mammogram is recommended biennially  for women age 54-69. Screening for cervical cancer with a pap smear and pelvic exam is recommended for women after age 72 years every 2 years up to age 79 or when the provider and patient decide to stop. If there is a history of cervical abnormalities or other increased risk for cancer then the test is recommended yearly.     Hepatitis C screening is also recommended for anyone born between [birth year] through [facility]. A shingles vaccine is also recommended once in a lifetime after age 61. Your Medicare Wellness Exam is recommended annually. Here is a list of your current Health Maintenance items with a due date:  Health Maintenance Due   Topic Date Due    Glaucoma Screening   08/20/2014    Breast Cancer Screening  03/16/2018    Annual Well Visit  03/23/2018     Medicare Part B Preventive Services  Limitations  Recommendation  Scheduled    Bone Mass Measurement  (age 72 & older, biennial)  Requires diagnosis related to osteoporosis or estrogen deficiency. Biennial benefit unless patient has history of long-term glucocorticoid tx or baseline is needed because initial test was by other method  Completed 5/11/16      Recommended every 2 years  Due 5/2018   Cardiovascular Screening Blood Tests (every 5 years)  Total cholesterol, HDL, Triglycerides  Order as a panel if possible  Completed 3/2017      Recommended annually  Due 3/2018   Colorectal Cancer Screening  -Fecal occult blood test (annual)  -Flexible sigmoidoscopy (5y)  -Screening colonoscopy (10y)  -Barium Enema     Completed 4/10/13 with Dr. Stevie Denney  Recommended every 5 years   Due 4/2018--scheduled     Counseling to Prevent Tobacco Use (up to 8 sessions per year)  - Counseling greater than 3 and up to 10 minutes  - Counseling greater than 10 minutes  Patients must be asymptomatic of tobacco-related conditions to receive as preventive service  N/A  N/A    Diabetes Screening Tests (at least every 3 years, Medicare covers annually or at 6-month intervals for prediabetic patients)      Fasting blood sugar (FBS) or glucose tolerance test (GTT)  Patient must be diagnosed with one of the following:  -Hypertension, Dyslipidemia, obesity, previous impaired FBS or GTT  Or any two of the following: overweight, FH of diabetes, age ? 72, history of gestational diabetes, birth of baby weighing more than 9 pounds  Completed 3/2017 with A1C 5.4      Typically checked annually Due 3/2018   Diabetes Self-Management Training (DSMT) (no USPSTF recommendation)  Requires referral by treating physician for patient with diabetes or renal disease.  10 hours of initial DSMT session of no less than 30 minutes each in a continuous 12-month period.  2 hours of follow-up DSMT in subsequent years.  N/A  N/A    Glaucoma Screening (no USPSTF recommendation)  Diabetes mellitus, family history, , age 48 or over,  American, age 72 or over  Completed 3/17     Recommended annually  Due 4/18 scheduled   Human Immunodeficiency Virus (HIV) Screening (annually for increased risk patients)  HIV-1 and HIV-2 by EIA, KAMARI, rapid antibody test, or oral mucosa transudate  Patient must be at increased risk for HIV infection per USPSTF guidelines or pregnant.  Tests covered annually for patients at increased risk.  Pregnant patients may receive up to 3 test during pregnancy.  N/A  N/A    Medical Nutrition Therapy (MNT) (for diabetes or renal disease not recommended schedule)  Requires referral by treating physician for patient with diabetes or renal disease.  Can be provided in same year as diabetes self-management training (DSMT), and CMS recommends medical nutrition therapy take place after DSMT.  Up to 3 hours for initial year and 2 hours in subsequent years.  N/A  N/A    Prostate Cancer Screening (annually up to age 76)  - Digital rectal exam (BENJY)  - Prostate specific antigen (PSA)  Annually (age 48 or over), BENJY not paid separately when covered E/M service is provided on same date  N/A  N/A    Seasonal Influenza Vaccination (annually)     Completed 9/2017     Recommended annually  Due Fall 2018   Pneumococcal Vaccination (once after 65)     Pneumovax - 8/13/2014      [id number] - never completed      Both recommended once over the age of 72 Completed           Declines    Hepatitis B Vaccinations (if medium/high risk)  Medium/high risk factors:  End-stage renal disease,  Hemophiliacs who received Factor VIII or IX concentrates, Clients of institutions for the mentally retarded, Persons who live in the same house as a HepB virus carrier, Homosexual men, Illicit injectable drug abusers.  N/A  N/A    Screening Mammography (biennial age 50-72)?  Annually (age 36 or over)  Completed 3/2017     Recommended annually     Due 3/2018  scheduled   Screening Pap Tests and Pelvic Examination (up to age 79 and after 79 if unknown history or abnormal study last 8 years)  Every 24 months except high risk  Completed 4/17 with Dr. Gonzalo Root  Recommended every other year  Due 4/2019   Ultrasound Screening for Abdominal Aortic Aneurysm (AAA) (once)  Patient must be referred through IPPE and not have had a screening for abdominal aortic aneurysm before under Medicare.  Limited to patients who meet one of the following criteria:  - Men who are 73-68 years old and have smoked more than 100 cigarettes in their lifetime.  -Anyone with a FH of AAA  -Anyone recommended for screening by USPSTF  N/A  N/A          Please bring in a copy of your advanced directive to your next office visit so we can have a copy on file.

## 2018-03-28 NOTE — MR AVS SNAPSHOT
[address] [facility] 
[phone] Patient: Rona Dalton MRN:  [id number] Visit Information Date & Time Provider Department Dept. Phone Encounter #  
 3/28/2018 11:45 AM Ressunnie Paige, [address] [facility] [address] [phone] Follow-up Instructions Return in about 6 months (around 2018). Your Appointments 3/28/2018 11:45 AM  
ROUTINE CARE with Resrigoberto Gibson, [address]) Appt Note: htn f/u; rcm,lvm  
 [address] [facility]  
[phone]  
  
   
 [address] [facility] [address] [address]  
  
    
 2018  9:15 AM  
ROUTINE CARE with Evita Rodríguez MD  
[facility] [address]) Appt Note: Well Woman $0CP  4.12.17  
 [address] [facility]. [address] [id number] [address] [place] [address] [address] [address]  
  
    
 2018  1:45 PM  
ROUTINE CARE with Resrigoberto Gibson, [address] [place] [address],[address]) Appt Note: 6 month follow up; r/s â¢6 month follow up mbt 2018  
 [address] [facility] [address] [facility]  
[phone] Upcoming Health Maintenance Date Due  
 GLAUCOMA SCREENING Q2Y 2014 BREAST CANCER SCRN MAMMOGRAM 3/16/2018 MEDICARE YEARLY EXAM 3/23/2018 Pneumococcal 65+ Low/Medium Risk (2 of 2 - PPSV23) 2019 COLONOSCOPY 4/10/2023 DTaP/Tdap/Td series (2 - Td) 2/10/2026 Allergies as of 3/28/2018  Review Complete On: 3/28/2018 By: Donna Casarez LPN Severity Noted Reaction Type Reactions Tramadol  2012   Intolerance Nausea Only Current Immunizations  Reviewed on 2012 Name Date Influenza High Dose Vaccine PF 2017 Influenza Vaccine (Quad) PF 2016 Pneumococcal Polysaccharide (PPSV-23) 8/13/2014 Tdap 2/10/2016 Not reviewed this visit You Were Diagnosed With   
  
 Codes Comments Essential hypertension    -  Primary ICD-10-CM: I10 
ICD-9-CM: 401.9 Medicare annual wellness visit, subsequent     ICD-10-CM: Z00.00 ICD-9-CM: V70.0 Osteopenia of multiple sites     ICD-10-CM: M85.89 ICD-9-CM: 733.90 Overweight     ICD-10-CM: P40.4 ICD-9-CM: 278.02 Dyslipidemia     ICD-10-CM: E78.5 ICD-9-CM: 272.4 Vitals BP Pulse Temp Resp Height(growth percentile) Weight(growth percentile) 111/66 (BP 1 Location: Right arm, BP Patient Position: Sitting) 68 97.7 °F (36.5 °C) (Oral) 16 5' (1.524 m) 131 lb (59.4 kg) LMP SpO2 BMI OB Status Smoking Status 06/10/2006 100% 25.58 kg/m2 Postmenopausal Never Smoker Vitals History BMI and BSA Data Body Mass Index Body Surface Area 25.58 kg/m 2 1.59 m 2 Preferred Pharmacy Pharmacy Name Phone [address] [facility], [address] [facility] [phone] Your Updated Medication List  
  
   
This list is accurate as of 3/28/18 10:46 AM.  Always use your most recent med list. amLODIPine-benazepril 10-20 mg per capsule Commonly known as:  Sandra Matos Take 1 Cap by mouth daily. chlorthalidone 25 mg tablet Commonly known as:  Thuy Nevarez Take 1 Tab by mouth daily. OTHER(NON-FORMULARY)  
daily. Vitafusion Women's Multivitamin VITAMIN D3 PO Take  by mouth daily. We Performed the Following CBC W/O DIFF [19602 CPT(R)] LIPID PANEL [76560 CPT(R)] METABOLIC PANEL, COMPREHENSIVE [98267 CPT(R)] TSH 3RD GENERATION [82477 CPT(R)] Follow-up Instructions Return in about 6 months (around 9/28/2018). To-Do List   
 05/14/2018 Imaging:  DEXA BONE DENSITY STUDY AXIAL Patient Instructions Medicare Wellness Visit, Female The best way to live healthy is to have a healthy lifestyle by eating a well-balanced diet, exercising regularly, limiting alcohol and stopping smoking. Regular physical exams and screening tests are another way to keep healthy. Preventive exams provided by your health care provider can find health problems before they become diseases or illnesses. Preventive services including immunizations, screening tests, monitoring and exams can help you take care of your own health. All people over age 72 should have a pneumovax  and and a prevnar shot to prevent pneumonia. These are once in a lifetime unless you and your provider decide differently. All people over 65 should have a yearly flu shot and a tetanus vaccine every 10 years. A bone mass density to screen for osteoporosis or thinning of the bones should be done every 2 years after 65. Screening for diabetes mellitus with a blood sugar test should be done every year. Glaucoma is a disease of the eye due to increased ocular pressure that can lead to blindness and it should be done every year by an eye professional. 
 
Cardiovascular screening tests that check for elevated lipids (fatty part of blood) which can lead to heart disease and strokes should be done every 5 years. Colorectal screening that evaluates for blood or polyps in your colon should be done yearly as a stool test or every five years as a flexible sigmoidoscope or every 10 years as a colonoscopy up to age 76. Breast cancer screening with a mammogram is recommended biennially  for women age 54-69. Screening for cervical cancer with a pap smear and pelvic exam is recommended for women after age 72 years every 2 years up to age 79 or when the provider and patient decide to stop. If there is a history of cervical abnormalities or other increased risk for cancer then the test is recommended yearly.  
 
Hepatitis C screening is also recommended for anyone born between 80 through 1965. A shingles vaccine is also recommended once in a lifetime after age 61. Your Medicare Wellness Exam is recommended annually. Here is a list of your current Health Maintenance items with a due date: 
Health Maintenance Due Topic Date Due  Glaucoma Screening   08/20/2014  Breast Cancer Screening  03/16/2018 [facility] Annual Well Visit  03/23/2018 Medicare Part B Preventive Services  Limitations  Recommendation  Scheduled   
Bone Mass Measurement 
(age 72 & older, biennial)  Requires diagnosis related to osteoporosis or estrogen deficiency. Biennial benefit unless patient has history of long-term glucocorticoid tx or baseline is needed because initial test was by other method  Completed 5/11/16 
   
Recommended every 2 years  Due 5/2018 Cardiovascular Screening Blood Tests (every 5 years) Total cholesterol, HDL, Triglycerides  Order as a panel if possible  Completed 3/2017 
   
Recommended annually  Due 3/2018 Colorectal Cancer Screening 
-Fecal occult blood test (annual) -Flexible sigmoidoscopy (5y) 
-Screening colonoscopy (10y) -Barium Enema     Completed 4/10/13 with Dr. Adrienne Roman 
Recommended every 5 years   Due 4/2018--scheduled    
Counseling to Prevent Tobacco Use (up to 8 sessions per year) - Counseling greater than 3 and up to 10 minutes - Counseling greater than 10 minutes  Patients must be asymptomatic of tobacco-related conditions to receive as preventive service  N/A  N/A   
Diabetes Screening Tests (at least every 3 years, Medicare covers annually or at 6-month intervals for prediabetic patients) 
   
Fasting blood sugar (FBS) or glucose tolerance test (GTT)  Patient must be diagnosed with one of the following: 
-Hypertension, Dyslipidemia, obesity, previous impaired FBS or GTT 
Or any two of the following: overweight, FH of diabetes, age ? 72, history of gestational diabetes, birth of baby weighing more than 9 pounds  Completed 3/2017 with A1C 5.4 
   
 Typically checked annually Due 3/2018 Diabetes Self-Management Training (DSMT) (no USPSTF recommendation)  Requires referral by treating physician for patient with diabetes or renal disease. 10 hours of initial DSMT session of no less than 30 minutes each in a continuous 12-month period.  2 hours of follow-up DSMT in subsequent years.  N/A  N/A   
Glaucoma Screening (no USPSTF recommendation)  Diabetes mellitus, family history, , age 48 or over,  American, age 72 or over  Completed 3/17 
  
Recommended annually  Due 4/18 scheduled Human Immunodeficiency Virus (HIV) Screening (annually for increased risk patients) HIV-1 and HIV-2 by EIA, KAMARI, rapid antibody test, or oral mucosa transudate  Patient must be at increased risk for HIV infection per USPSTF guidelines or pregnant.  Tests covered annually for patients at increased risk.  Pregnant patients may receive up to 3 test during pregnancy.  N/A  N/A   
Medical Nutrition Therapy (MNT) (for diabetes or renal disease not recommended schedule)  Requires referral by treating physician for patient with diabetes or renal disease.  Can be provided in same year as diabetes self-management training (DSMT), and CMS recommends medical nutrition therapy take place after DSMT.  Up to 3 hours for initial year and 2 hours in subsequent years.  N/A  N/A   
Prostate Cancer Screening (annually up to age 76) - Digital rectal exam (BENJY) - Prostate specific antigen (PSA)  Annually (age 48 or over), BENJY not paid separately when covered E/M service is provided on same date  N/A  N/A   
Seasonal Influenza Vaccination (annually)     Completed 9/2017 
  
Recommended annually  Due Fall 2018 Pneumococcal Vaccination (once after 65)     Pneumovax - 8/13/2014 
   
[id number] - never completed  
  
Both recommended once over the age of 72 Completed    
   
Declines Hepatitis B Vaccinations (if medium/high risk)  Medium/high risk factors:  End-stage renal disease, Hemophiliacs who received Factor VIII or IX concentrates, Clients of institutions for the mentally retarded, Persons who live in the same house as a HepB virus carrier, Homosexual men, Illicit injectable drug abusers.  N/A  N/A   
Screening Mammography (biennial age 50-72)?  Annually (age 36 or over)  Completed 3/2017 
  
Recommended annually 
   Due 3/2018 
scheduled Screening Pap Tests and Pelvic Examination (up to age 79 and after 79 if unknown history or abnormal study last 10 years)  Every 24 months except high risk  Completed 4/17 with Dr. Dank Cortes 
Recommended every other year  Due 4/2019 Ultrasound Screening for Abdominal Aortic Aneurysm (AAA) (once)  Patient must be referred through IPPE and not have had a screening for abdominal aortic aneurysm before under Medicare.  Limited to patients who meet one of the following criteria: 
- Men who are 73-68 years old and have smoked more than 100 cigarettes in their lifetime. 
-Anyone with a FH of AAA 
-Anyone recommended for screening by USPSTF  N/A  N/A   
  
  
Please bring in a copy of your advanced directive to your next office visit so we can have a copy on file. Introducing [facility]! [facility] introduces en-Gauge patient portal. Now you can access parts of your medical record, email your doctor's office, and request medication refills online. 1. In your internet browser, go to [email or web address] 2. Click on the First Time User? Click Here link in the Sign In box. You will see the New Member Sign Up page. 3. Enter your en-Gauge Access Code exactly as it appears below. You will not need to use this code after youve completed the sign-up process. If you do not sign up before the expiration date, you must request a new code. · en-Gauge Access Code: [email or web address] Expires: 6/26/2018 10:45 AM 
 
4.  Enter the last four digits of your Social Security Number (xxxx) and Date of Birth (mm/dd/yyyy) as indicated and click Submit. You will be taken to the next sign-up page. 5. Create a [employer] ID. This will be your [employer] login ID and cannot be changed, so think of one that is secure and easy to remember. 6. Create a [employer] password. You can change your password at any time. 7. Enter your Password Reset Question and Answer. This can be used at a later time if you forget your password. 8. Enter your e-mail address. You will receive e-mail notification when new information is available in [address] 9. Click Sign Up. You can now view and download portions of your medical record. 10. Click the Download Summary menu link to download a portable copy of your medical information. If you have questions, please visit the Frequently Asked Questions section of the [employer] website. Remember, [employer] is NOT to be used for urgent needs. For medical emergencies, dial 911. Now available from your iPhone and Android! Please provide this summary of care documentation to your next provider. Your primary care clinician is listed as Marcus Bautista. If you have any questions after today's visit, please call [phone].

## 2018-03-28 NOTE — PROGRESS NOTES
HISTORY OF PRESENT ILLNESS  Shawn Jackson is a 75 y.o. female. HPI   Last here 17. Pt is here to f/u on chronic conditions.      BP today is 111/66  BPs are 128/67, 123/69, 125/59, 107/59, mostly 120-125/50-60s at [employer] lotrel 10-20mg and chlorthalidone 25mg daily    Wt is up 5 lbs since last visit  Discussed diet and w/l       Reviewed last labs   Repeat labs today    Lov, pt c/o palpitations, some SOB and cough x 1-2 weeks  Pt attributed her sx to the flu shot  Reviewed CXR : nl  Pt also saw Dr. Severiano Lederer (cardio) for her sx  Reviewed notes 10/3/17: Patient presents for new patient evaluation with complaints of palpitations that are improving since she completed ABX. Hx of HTN. EKG with atrial enlargement, NS changes. No ischemic symptoms. Will get echo. Advised that if there are recurrent palpitations of concern, we have 24 hour and 2 week event monitors available. If echo is normal, she will follow-up as needed. Reviewed ECHO 10/17: nl  Her sx have since resolved      Pt follows with Dr. Caden Jaquez (derm) annually   Last visit was       Continues vit D OTC 2000U daily     Pt is independent (pt can drive/feed/bathe etc. herself)    Pt lives and gets along well with her       ACP not on file. SDMs are [employer]  () or Metta Cousins (sister). Provided paperwork to complete at home.   She will bring in completed copy to office.      PREVENTIVE:    Colonoscopy: 4/10/13, Dr. Tess Ramírez, repeat 5 years, due , scheduled for 18  Pap: Dr. Anne-Marie Rodriguez, , every other year   Mammogram: Iris, 3/16/17, negative, scheduled for 3/29/18  DEXA: 16, osteopenia, ordered   Tdap: 2/10/2016  Pneumovax: 2014  Qetzzxw14: declines  Zostavax: declines  Shingrix: declines  Flu shot:   A1c:  nl, 3/17 5.4  Eye exam: CLARISSA TORRES ([phone]), 2017?, scheduled for 18  Lipids: 3/17    EK17, new T inversions in the anterior leads  Hep C screen: 9/16, negative    Patient Active Problem List    Diagnosis Date Noted    Osteopenia 03/22/2017    Essential hypertension 04/06/2016    ACP (advance care planning) 02/10/2016    Symptomatic menopausal or female climacteric states 10/25/2013    Bursitis of shoulder, right 01/05/2012    HTN (hypertension) 05/13/2010    Vitiligo 05/13/2010     Current Outpatient Prescriptions   Medication Sig Dispense Refill    chlorthalidone (HYGROTEN) 25 mg tablet Take 1 Tab by mouth daily. 30 Tab 0    amLODIPine-benazepril (LOTREL) 10-20 mg per capsule Take 1 Cap by mouth daily. 30 Cap 0    OTHER,NON-FORMULARY, daily. Vitafusion Women's Multivitamin      CHOLECALCIFEROL, VITAMIN D3, (VITAMIN D3 PO) Take  by mouth daily. Past Surgical History:   Procedure Laterality Date    ENDOSCOPY, COLON, DIAGNOSTIC  2003    hyperplastic polyps; 10 year repeat; Dr. Oxana Redding      Bone spurs removed R & L foot     HX HEENT  03/2013    cataract removal    HX Marianoshakeel Boobirvalentine Soriano    HX TUBAL LIGATION        Lab Results  Component Value Date/Time   WBC 3.9 09/20/2017 09:35 AM   HGB 12.2 09/20/2017 09:35 AM   HCT 37.5 09/20/2017 09:35 AM   PLATELET 589 38/83/0768 09:35 AM   MCV 91 09/20/2017 09:35 AM     Lab Results  Component Value Date/Time   Cholesterol, total 174 03/22/2017 08:53 AM   HDL Cholesterol 58 03/22/2017 08:53 AM   LDL, calculated 107 (H) 03/22/2017 08:53 AM   Triglyceride 46 03/22/2017 08:53 AM     Lab Results  Component Value Date/Time   GFR est non-AA 61 09/20/2017 09:35 AM   GFR est AA 70 09/20/2017 09:35 AM   Creatinine 0.96 09/20/2017 09:35 AM   BUN 20 09/20/2017 09:35 AM   Sodium 140 09/20/2017 09:35 AM   Potassium 3.9 09/20/2017 09:35 AM   Chloride 99 09/20/2017 09:35 AM   CO2 27 09/20/2017 09:35 AM        Review of Systems   HENT: Negative for hearing loss. Respiratory: Negative for shortness of breath.     Cardiovascular: Negative for chest pain.   Musculoskeletal: Negative for falls. Psychiatric/Behavioral: Negative for depression and memory loss. Physical Exam   Constitutional: She is oriented to person, place, and time. She appears well-developed and well-nourished. No distress. HENT:   Head: Normocephalic and atraumatic. Right Ear: External ear normal.   Left Ear: External ear normal.   Mouth/Throat: Oropharynx is clear and moist. No oropharyngeal exudate. Eyes: Conjunctivae and EOM are normal. Right eye exhibits no discharge. Left eye exhibits no discharge. No scleral icterus. Neck: Normal range of motion. Neck supple. No carotid bruits    Cardiovascular: Normal rate, regular rhythm, normal heart sounds and intact distal pulses. Exam reveals no gallop and no friction rub. No murmur heard. Pulmonary/Chest: Effort normal and breath sounds normal. No respiratory distress. She has no wheezes. She has no rales. She exhibits no tenderness. Abdominal: Soft. She exhibits no distension and no mass. There is no tenderness. There is no rebound and no guarding. Musculoskeletal: Normal range of motion. She exhibits no edema, tenderness or deformity. Lymphadenopathy:     She has no cervical adenopathy. Neurological: She is alert and oriented to person, place, and time. Coordination normal.   Skin: Skin is warm and dry. No rash noted. She is not diaphoretic. No erythema. No pallor. Psychiatric: She has a normal mood and affect. Her behavior is normal.       ASSESSMENT and PLAN    ICD-10-CM ICD-9-CM    1. Essential hypertension    Well-controled on lotrel and chlorthalidone   I10 401.9 LIPID PANEL      METABOLIC PANEL, COMPREHENSIVE      CBC W/O DIFF      TSH 3RD GENERATION   2. Medicare annual wellness visit, subsequent Z00.00 V70.0 LIPID PANEL      METABOLIC PANEL, COMPREHENSIVE      CBC W/O DIFF      TSH 3RD GENERATION   3.  Osteopenia of multiple sites    Due for DEXA in 5/18, continue vit D   M85.89 733.90 DEXA BONE DENSITY STUDY AXIAL      LIPID PANEL      METABOLIC PANEL, COMPREHENSIVE      CBC W/O DIFF      TSH 3RD GENERATION   4. Overweight    Counseled on diet and w/l, only needs to lose a few lbs   E66.3 278.02 LIPID PANEL      METABOLIC PANEL, COMPREHENSIVE      CBC W/O DIFF      TSH 3RD GENERATION   5. Dyslipidemia    Diet-controled, check lipids today   E78.5 272.4 LIPID PANEL      METABOLIC PANEL, COMPREHENSIVE      CBC W/O DIFF      TSH 3RD GENERATION      Depression screen reviewed and negative. Scribed by Bria Magallanes of [facility], as dictated by Dr. Donte Everett. Current diagnosis and concerns discussed with pt at length. Pt understands risks and benefits or current treatment plan and medications, and accepts the treatment and medication with any possible risks. Pt asks appropriate questions, which were answered. Pt was instructed to call with any concerns or problems. This note will not be viewable in [address]

## 2018-03-29 LAB
ALBUMIN SERPL-MCNC: 4 G/DL (ref 3.6–4.8)
ALBUMIN/GLOB SERPL: 1.4 {RATIO} (ref 1.2–2.2)
ALP SERPL-CCNC: 98 IU/L (ref 39–117)
ALT SERPL-CCNC: 15 IU/L (ref 0–32)
AST SERPL-CCNC: 20 IU/L (ref 0–40)
BILIRUB SERPL-MCNC: 0.3 MG/DL (ref 0–1.2)
BUN SERPL-MCNC: 22 MG/DL (ref 8–27)
BUN/CREAT SERPL: 23 (ref 12–28)
CALCIUM SERPL-MCNC: 9.4 MG/DL (ref 8.7–10.3)
CHLORIDE SERPL-SCNC: 102 MMOL/L (ref 96–106)
CHOLEST SERPL-MCNC: 178 MG/DL (ref 100–199)
CO2 SERPL-SCNC: 26 MMOL/L (ref 18–29)
CREAT SERPL-MCNC: 0.97 MG/DL (ref 0.57–1)
ERYTHROCYTE [DISTWIDTH] IN BLOOD BY AUTOMATED COUNT: 13.1 % (ref 12.3–15.4)
GFR SERPLBLD CREATININE-BSD FMLA CKD-EPI: 60 ML/MIN/1.73
GFR SERPLBLD CREATININE-BSD FMLA CKD-EPI: 69 ML/MIN/1.73
GLOBULIN SER CALC-MCNC: 2.8 G/DL (ref 1.5–4.5)
GLUCOSE SERPL-MCNC: 77 MG/DL (ref 65–99)
HCT VFR BLD AUTO: 39.3 % (ref 34–46.6)
HDLC SERPL-MCNC: 58 MG/DL
HGB BLD-MCNC: 12.5 G/DL (ref 11.1–15.9)
LDLC SERPL CALC-MCNC: 109 MG/DL (ref 0–99)
MCH RBC QN AUTO: 29.6 PG (ref 26.6–33)
MCHC RBC AUTO-ENTMCNC: 31.8 G/DL (ref 31.5–35.7)
MCV RBC AUTO: 93 FL (ref 79–97)
PLATELET # BLD AUTO: 235 X10E3/UL (ref 150–379)
POTASSIUM SERPL-SCNC: 3.8 MMOL/L (ref 3.5–5.2)
PROT SERPL-MCNC: 6.8 G/DL (ref 6–8.5)
RBC # BLD AUTO: 4.23 X10E6/UL (ref 3.77–5.28)
SODIUM SERPL-SCNC: 144 MMOL/L (ref 134–144)
TRIGL SERPL-MCNC: 55 MG/DL (ref 0–149)
TSH SERPL DL<=0.005 MIU/L-ACNC: 1.38 UIU/ML (ref 0.45–4.5)
VLDLC SERPL CALC-MCNC: 11 MG/DL (ref 5–40)
WBC # BLD AUTO: 3.4 X10E3/UL (ref 3.4–10.8)

## 2018-04-05 ENCOUNTER — HOSPITAL ENCOUNTER (OUTPATIENT)
Dept: MAMMOGRAPHY | Age: 68
Discharge: HOME | End: 2018-04-05
Attending: INTERNAL MEDICINE
Payer: MEDICARE

## 2018-04-05 DIAGNOSIS — M85.89 OSTEOPENIA OF MULTIPLE SITES: ICD-10-CM

## 2018-04-05 PROCEDURE — 77080 DXA BONE DENSITY AXIAL: CPT

## 2018-04-08 NOTE — PROGRESS NOTES
the patient has osteoporosis on dxa scan, in the forearm, however hips are progressively thin and almost osteoporosis. Recommend treatment at this time with fosamax and vitamin D.  Take fosamax 70mg once per week, first thing in the morning, no laying down or eating for 30minutes after taking the medication

## 2018-04-10 NOTE — PROGRESS NOTES
Called, spoke to pt. Two pt identifiers confirmed. Pt informed per Dr. Sindy Eddy has osteoporosis on dxa scan, in the forearm, however hips are progressively thin and almost osteoporosis. Recommend treatment at this time with fosamax and vitamin D. Take fosamax 70mg once per week, first thing in the morning, no laying down or eating for 30minutes after taking the medication. Pt agrees to fosamax. Pt verbalized understanding of information discussed w/ no further questions at this time.

## 2018-04-18 ENCOUNTER — OFFICE VISIT (OUTPATIENT)
Dept: SURGERY | Age: 68
End: 2018-04-18

## 2018-04-18 VITALS
OXYGEN SATURATION: 99 % | BODY MASS INDEX: 25.45 KG/M2 | WEIGHT: 129.6 LBS | DIASTOLIC BLOOD PRESSURE: 69 MMHG | HEIGHT: 60 IN | HEART RATE: 64 BPM | SYSTOLIC BLOOD PRESSURE: 121 MMHG | TEMPERATURE: 96.8 F

## 2018-04-18 DIAGNOSIS — N95.1 SYMPTOMATIC MENOPAUSAL OR FEMALE CLIMACTERIC STATES: Primary | ICD-10-CM

## 2018-04-18 DIAGNOSIS — Z12.31 ENCOUNTER FOR SCREENING MAMMOGRAM FOR BREAST CANCER: ICD-10-CM

## 2018-04-18 DIAGNOSIS — M85.80 OSTEOPENIA, UNSPECIFIED LOCATION: ICD-10-CM

## 2018-04-18 NOTE — PROGRESS NOTES
SUBJECTIVE: Kimberley Kulkarni is a 75 y.o. female [id number] (Abortions 0, Miscarriages 0), who presents with desire for annual well woman exam. Patient's last menstrual period was 06/10/2006. Allergies   Allergen Reactions    Tramadol Nausea Only        Past Medical History:   Diagnosis Date    HTN (hypertension) 2010    Vitiligo 2010    Saniya Radish, CFNP       Past Surgical History:   Procedure Laterality Date    ENDOSCOPY, COLON, DIAGNOSTIC      hyperplastic polyps; 10 year repeat; Dr. Joseph Minor    FOOT/TOES SURGERY PROC UNLISTED      Bone spurs removed R & L foot     HX HEENT  2013    cataract removal    Christ Rodas HX TUBAL LIGATION         OB History      Para Term  AB Living    3 3        SAB TAB Ectopic Molar Multiple Live Births                   Family History   Problem Relation Age of Onset    Hypertension Sister     Stroke Sister 61    Diabetes Sister     Tuberculosis Sister     Stroke Mother 80     TIA    Cancer Father      Lung cancer       Social History     Social History    Marital status:      Spouse name: N/A    Number of children: N/A    Years of education: N/A     Occupational History    Not on file. Social History Main Topics    Smoking status: Never Smoker    Smokeless tobacco: Never Used    Alcohol use Yes      Comment: occasional    Drug use: No    Sexual activity: Yes     Partners: Male     Birth control/ protection: Surgical     Other Topics Concern    Not on file     Social History Narrative       Current Outpatient Prescriptions   Medication Sig Dispense Refill    chlorthalidone (HYGROTEN) 25 mg tablet Take 1 Tab by mouth daily. 30 Tab 0    amLODIPine-benazepril (LOTREL) 10-20 mg per capsule Take 1 Cap by mouth daily. 30 Cap 0       Review of Systems:   Constitutional: No weight change, chills or fever, anorexia, weakness or sleep disturbance .  Cardiovascular: No chest pain, shortness of breath, or palpitations . Respiratory: No cough, shortness of breath, hemoptysis, or orthopnea . Neurologic: No syncope, headaches or seizures . Hematologic: No easy bruising or unusual bleeding . Psychiatric: No insomnia, confusion, depression, or anxiety . GI:No nausea and vomiting, diarrhea or constipation  . : See HPI . Musculoskeletal: No joint pain or muscle pain . Endocrine: No polydipsia, polyuria, cold intolerance, excessive fatigue, or sleep disturbance . Integumentary: No breast pain, lumps, nipple discharge, or axillary lumps . Objective:     Visit Vitals    /69    Pulse 64    Temp 96.8 °F (36 °C) (Temporal)    Ht 5' (1.524 m)    Wt 129 lb 9.6 oz (58.8 kg)    LMP 06/10/2006    SpO2 99%    BMI 25.31 kg/m2       General:  alert, cooperative, no distress, appears stated age   Skin:  no rash or abnormalities   Eyes: negative   Mouth: MMM no lesions   Lymph Nodes:  Cervical, supraclavicular, and axillary nodes normal.   Breast Exam: normal appearance, no masses or tenderness    Lungs:  clear to auscultation bilaterally   Heart:  regular rate and rhythm   Abdomen: soft, non-tender. Bowel sounds normal. No masses,  no organomegaly   Back:  Costovertebral angle tenderness absent   Genitourinary: Pelvic exam: VULVA: normal appearing vulva with no masses, tenderness or lesions, VAGINA: normal appearing vagina with normal color and discharge, no lesions, CERVIX: normal appearing cervix without discharge or lesions, UTERUS: uterus is normal size, shape, consistency and nontender, ADNEXA: normal adnexa in size, nontender and no masses    Extremities:  extremities normal, atraumatic, no cyanosis or edema   Neurologic:  sensation grossly intact. Psychiatric:  non focal     ASSESSMENT:      ICD-10-CM ICD-9-CM    1. Symptomatic menopausal or female climacteric states N95.1 627.2    2. Osteopenia, unspecified location M85.80 733.90    3.  Encounter for screening mammogram for breast cancer Z12.31 V76.12 [facility] MAMMO BI SCREENING INCL CAD        Follow-up Disposition:  Return in about 1 year (around 4/18/2019), or if symptoms worsen or fail to improve.

## 2018-04-18 NOTE — MR AVS SNAPSHOT
[address] [place] [address] [address] [address] [phone] Patient: Brenna Jeter MRN:  [id number] Visit Information Date & Time Provider Department Dept. Phone Encounter #  
 4/18/2018  9:15 AM Juanito Martinez, [address] [facility] Surgical [address] [id number] Follow-up Instructions Return in about 1 year (around 4/18/2019), or if symptoms worsen or fail to improve. Follow-up and Disposition History Your Appointments 9/26/2018  9:15 AM  
ROUTINE CARE with Bessie Montemayor, [address] [place] [address]) Appt Note: 6 month follow up  
 [address] [facility] [address] [address]  
[address]  [address] [facility] [address] [place] [address] Upcoming Health Maintenance Date Due  
 GLAUCOMA SCREENING Q2Y 8/20/2014 BREAST CANCER SCRN MAMMOGRAM 3/16/2018 MEDICARE YEARLY EXAM 3/29/2019 Pneumococcal 65+ Low/Medium Risk (2 of 2 - PPSV23) 8/13/2019 COLONOSCOPY 4/10/2023 DTaP/Tdap/Td series (2 - Td) 2/10/2026 Allergies as of 4/18/2018  Review Complete On: 4/18/2018 By: Juanito Martinez MD  
  
 Severity Noted Reaction Type Reactions Tramadol  03/21/2012   Intolerance Nausea Only Current Immunizations  Reviewed on 9/28/2012 Name Date Influenza High Dose Vaccine PF 9/20/2017 Influenza Vaccine (Quad) PF 9/21/2016 Pneumococcal Polysaccharide (PPSV-23) 8/13/2014 Tdap 2/10/2016 Not reviewed this visit You Were Diagnosed With   
  
 Codes Comments Symptomatic menopausal or female climacteric states    -  Primary ICD-10-CM: N95.1 ICD-9-CM: 627.2 Osteopenia, unspecified location     ICD-10-CM: M85.80 ICD-9-CM: 733.90 Encounter for screening mammogram for breast cancer     ICD-10-CM: Z12.31 
ICD-9-CM: V76.12 Vitals  BP Pulse Temp Height(growth percentile) Weight(growth percentile) LMP  
 121/69 64 96.8 °F (36 °C) (Temporal) 5' (1.524 m) 129 lb 9.6 oz (58.8 kg) 06/10/2006 SpO2 BMI OB Status Smoking Status 99% 25.31 kg/m2 Postmenopausal Never Smoker Vitals History BMI and BSA Data Body Mass Index Body Surface Area  
 25.31 kg/m 2 1.58 m 2 Preferred Pharmacy Pharmacy Name Phone [facility], [address] [place] [phone] Your Updated Medication List  
  
   
This list is accurate as of 4/18/18 10:15 AM.  Always use your most recent med list. amLODIPine-benazepril 10-20 mg per capsule Commonly known as:  Jayjay Johnson Take 1 Cap by mouth daily. chlorthalidone 25 mg tablet Commonly known as:  Taran Vancouver Take 1 Tab by mouth daily. Follow-up Instructions Return in about 1 year (around 4/18/2019), or if symptoms worsen or fail to improve. To-Do List   
 04/18/2018 Imaging:  [facility] MAMMO BI SCREENING INCL CAD Introducing [facility]! Patrick Mejias introduces Avtodoria patient portal. Now you can access parts of your medical record, email your doctor's office, and request medication refills online. 1. In your internet browser, go to [email or web address] 2. Click on the First Time User? Click Here link in the Sign In box. You will see the New Member Sign Up page. 3. Enter your Avtodoria Access Code exactly as it appears below. You will not need to use this code after youve completed the sign-up process. If you do not sign up before the expiration date, you must request a new code. · Avtodoria Access Code: [email or web address] Expires: 6/26/2018 10:45 AM 
 
4. Enter the last four digits of your Social Security Number (xxxx) and Date of Birth (mm/dd/yyyy) as indicated and click Submit. You will be taken to the next sign-up page. 5. Create a Avtodoria ID. This will be your Avtodoria login ID and cannot be changed, so think of one that is secure and easy to remember. 6. Create a Leapfactor password. You can change your password at any time. 7. Enter your Password Reset Question and Answer. This can be used at a later time if you forget your password. 8. Enter your e-mail address. You will receive e-mail notification when new information is available in [address] 9. Click Sign Up. You can now view and download portions of your medical record. 10. Click the Download Summary menu link to download a portable copy of your medical information. If you have questions, please visit the Frequently Asked Questions section of the Leapfactor website. Remember, Leapfactor is NOT to be used for urgent needs. For medical emergencies, dial 911. Now available from your iPhone and Android! Please provide this summary of care documentation to your next provider. Your primary care clinician is listed as Roz Spann. If you have any questions after today's visit, please call [phone].

## 2018-05-24 RX ORDER — ALENDRONATE SODIUM 70 MG/1
70 TABLET ORAL
Qty: 12 TAB | Refills: 1 | Status: SHIPPED | OUTPATIENT
Start: 2018-05-24 | End: 2019-11-18

## 2018-05-24 NOTE — TELEPHONE ENCOUNTER
PCP: Maria Del Carmen Henriquez MD    Last appt: 3/28/2018  Future Appointments  Date Time Provider Ravindra Manuel   9/26/2018 9:15 AM Maria Del Carmen Henriquez MD [address]   4/24/2019 9:15 AM [facility]       Requested Prescriptions     Pending Prescriptions Disp Refills    alendronate (FOSAMAX) 70 mg tablet 12 Tab 1     Sig: Take 1 Tab by mouth every seven (7) days.

## 2018-06-21 RX ORDER — CHLORTHALIDONE 25 MG/1
25 TABLET ORAL DAILY
Qty: 90 TAB | Refills: 0 | Status: SHIPPED | OUTPATIENT
Start: 2018-06-21 | End: 2018-09-26 | Stop reason: SDUPTHER

## 2018-06-21 RX ORDER — AMLODIPINE AND BENAZEPRIL HYDROCHLORIDE 10; 20 MG/1; MG/1
1 CAPSULE ORAL DAILY
Qty: 90 CAP | Refills: 0 | Status: SHIPPED | OUTPATIENT
Start: 2018-06-21 | End: 2018-09-26 | Stop reason: SDUPTHER

## 2018-06-21 NOTE — TELEPHONE ENCOUNTER
Patient needs refills done for 90 day supplies thru [facility] order indicated.  Thank you

## 2018-06-21 NOTE — TELEPHONE ENCOUNTER
PCP: Asia Abdullahi MD    Last appt: 3/28/2018  Future Appointments  Date Time Provider Ravindra Manuel   9/26/2018 9:15 AM [address]   4/24/2019 9:15 AM MD Ike [address]       Requested Prescriptions     Pending Prescriptions Disp Refills    amLODIPine-benazepril (LOTREL) 10-20 mg per capsule 30 Cap 0     Sig: Take 1 Cap by mouth daily.  chlorthalidone (HYGROTEN) 25 mg tablet 30 Tab 0     Sig: Take 1 Tab by mouth daily.

## 2018-09-26 ENCOUNTER — OFFICE VISIT (OUTPATIENT)
Dept: INTERNAL MEDICINE CLINIC | Age: 68
End: 2018-09-26

## 2018-09-26 VITALS
HEART RATE: 70 BPM | SYSTOLIC BLOOD PRESSURE: 120 MMHG | DIASTOLIC BLOOD PRESSURE: 70 MMHG | BODY MASS INDEX: 25.52 KG/M2 | HEIGHT: 60 IN | RESPIRATION RATE: 16 BRPM | OXYGEN SATURATION: 99 % | TEMPERATURE: 98 F | WEIGHT: 130 LBS

## 2018-09-26 DIAGNOSIS — Z23 ENCOUNTER FOR IMMUNIZATION: ICD-10-CM

## 2018-09-26 DIAGNOSIS — M81.0 AGE-RELATED OSTEOPOROSIS WITHOUT CURRENT PATHOLOGICAL FRACTURE: ICD-10-CM

## 2018-09-26 DIAGNOSIS — E78.5 DYSLIPIDEMIA: ICD-10-CM

## 2018-09-26 DIAGNOSIS — E55.9 VITAMIN D DEFICIENCY: ICD-10-CM

## 2018-09-26 DIAGNOSIS — I10 ESSENTIAL HYPERTENSION: Primary | ICD-10-CM

## 2018-09-26 RX ORDER — CHLORTHALIDONE 25 MG/1
25 TABLET ORAL DAILY
Qty: 90 TAB | Refills: 1 | Status: SHIPPED | OUTPATIENT
Start: 2018-09-26 | End: 2019-03-27 | Stop reason: SDUPTHER

## 2018-09-26 RX ORDER — AMLODIPINE AND BENAZEPRIL HYDROCHLORIDE 10; 20 MG/1; MG/1
1 CAPSULE ORAL DAILY
Qty: 90 CAP | Refills: 1 | Status: SHIPPED | OUTPATIENT
Start: 2018-09-26 | End: 2019-03-27 | Stop reason: SDUPTHER

## 2018-09-26 NOTE — MR AVS SNAPSHOT
[address] [facility] 
[phone] Patient: Mere Adams MRN:  [id number] Visit Information Date & Time Provider Department Dept. Phone Encounter #  
 9/26/2018  9:15 AM Maria D Funesr, [address] [id number] Follow-up Instructions Return in about 6 months (around 3/26/2019). Your Appointments 9/26/2018  9:15 AM  
ROUTINE CARE with Maria D Bonner, [address] [facility] [address]) Appt Note: 6 month follow up; 09/25/18-LEFT VMAIL FOR [facility]. ...[facility] [address] [facility]  
[phone]  
  
   
 [address] [place] [address] [address] [facility]  
  
    
 4/24/2019  9:15 AM  
ROUTINE CARE with Zahraa Alejandro MD  
[facility] [address]) Appt Note: Well Woman $0CP  
 [address] [facility]. [address] [address] [address] [address] Upcoming Health Maintenance Date Due Shingrix Vaccine Age 50> (1 of 2) 8/20/1999 GLAUCOMA SCREENING Q2Y 8/20/2014 Influenza Age 5 to Adult 8/1/2018 MEDICARE YEARLY EXAM 3/29/2019 BREAST CANCER SCRN MAMMOGRAM 5/2/2019 Pneumococcal 65+ Low/Medium Risk (2 of 2 - PPSV23) 8/13/2019 DTaP/Tdap/Td series (2 - Td) 2/10/2026 COLONOSCOPY 5/11/2028 Allergies as of 9/26/2018  Review Complete On: 4/18/2018 By: Zahraa Alejandro Severity Noted Reaction Type Reactions Tramadol  03/21/2012   Intolerance Nausea Only Current Immunizations  Reviewed on 9/28/2012 Name Date Influenza High Dose Vaccine PF 9/20/2017 Influenza Vaccine (Quad) PF 9/21/2016 Pneumococcal Polysaccharide (PPSV-23) 8/13/2014 Tdap 2/10/2016 Not reviewed this visit You Were Diagnosed With   
  
 Codes Comments Essential hypertension    -  Primary ICD-10-CM: I10 
ICD-9-CM: 401.9 Dyslipidemia     ICD-10-CM: E78.5 ICD-9-CM: 272.4 Age-related osteoporosis without current pathological fracture     ICD-10-CM: M81.0 ICD-9-CM: 733.01 Vitamin D deficiency     ICD-10-CM: E55.9 ICD-9-CM: 268.9 Vitals BP Pulse Temp Resp Height(growth percentile) Weight(growth percentile) 120/70 (BP 1 Location: Left arm, BP Patient Position: Sitting) 70 98 °F (36.7 °C) (Oral) 16 5' (1.524 m) 130 lb (59 kg) LMP SpO2 BMI OB Status Smoking Status 06/10/2006 99% 25.39 kg/m2 Postmenopausal Never Smoker Vitals History BMI and BSA Data Body Mass Index Body Surface Area  
 25.39 kg/m 2 1.58 m 2 Preferred Pharmacy Pharmacy Name Phone [address] [phone] Your Updated Medication List  
  
   
This list is accurate as of 9/26/18  8:44 AM.  Always use your most recent med list.  
  
  
  
  
 alendronate 70 mg tablet Commonly known as:  FOSAMAX Take 1 Tab by mouth every seven (7) days. amLODIPine-benazepril 10-20 mg per capsule Commonly known as:  Jovon Setters Take 1 Cap by mouth daily. chlorthalidone 25 mg tablet Commonly known as:  Dunn Gasman Take 1 Tab by mouth daily. Prescriptions Sent to Pharmacy Refills  
 amLODIPine-benazepril (LOTREL) 10-20 mg per capsule 1 Sig: Take 1 Cap by mouth daily. Class: Normal  
 Pharmacy: [address] [place] [address], [address] [place] [address] Ph #: [phone] Route: Oral  
 chlorthalidone (HYGROTEN) 25 mg tablet 1 Sig: Take 1 Tab by mouth daily. Class: Normal  
 Pharmacy: [address] [place] [address] Ph #: [phone] Route: Oral  
  
We Performed the Following LIPID PANEL [84980 CPT(R)] METABOLIC PANEL, COMPREHENSIVE [27793 CPT(R)] VITAMIN D, 25 HYDROXY [id number] CPT(R)] Follow-up Instructions Return in about 6 months (around 3/26/2019). Patient Instructions Start fosamax weekly Email/call with colonoscopy doctor Introducing [facility]! [employer] introduces Remicalm patient portal. Now you can access parts of your medical record, email your doctor's office, and request medication refills online. 1. In your internet browser, go to [email or web address] 2. Click on the First Time User? Click Here link in the Sign In box. You will see the New Member Sign Up page. 3. Enter your Remicalm Access Code exactly as it appears below. You will not need to use this code after youve completed the sign-up process. If you do not sign up before the expiration date, you must request a new code. · Remicalm Access Code: [email or web address] Expires: 12/25/2018  8:43 AM 
 
4. Enter the last four digits of your Social Security Number (xxxx) and Date of Birth (mm/dd/yyyy) as indicated and click Submit. You will be taken to the next sign-up page. 5. Create a Remicalm ID. This will be your Remicalm login ID and cannot be changed, so think of one that is secure and easy to remember. 6. Create a Remicalm password. You can change your password at any time. 7. Enter your Password Reset Question and Answer. This can be used at a later time if you forget your password. 8. Enter your e-mail address. You will receive e-mail notification when new information is available in [address] 9. Click Sign Up. You can now view and download portions of your medical record. 10. Click the Download Summary menu link to download a portable copy of your medical information. If you have questions, please visit the Frequently Asked Questions section of the Remicalm website. Remember, Remicalm is NOT to be used for urgent needs. For medical emergencies, dial 911. Now available from your iPhone and Android! Please provide this summary of care documentation to your next provider. Your primary care clinician is listed as Idalia Gudino. If you have any questions after today's visit, please call [phone].

## 2018-09-26 NOTE — PROGRESS NOTES
HISTORY OF PRESENT ILLNESS Buck Mitchell is a 70 y.o. female. HPI Last here 3/28/18. Pt is here to f/u on chronic conditions. 
   
BP today is 120/70 BP around 114/74, 120s/60s, 107/59 at home Continues lotrel 10-20mg and chlorthalidone 25mg daily 
  
Wt is stable x lov Discussed her wt being close to nl range 
   
Reviewed last labs 3/18 Will get labs today 
  In the past, pt c/o palpitations, some SOB and cough States sx resolved Pt also saw Dr. Akira Cruz (cardio) for her sx Last visit was10/3/17 Her sx have since resolved States she has had no further issues 
   
Pt follows with Dr. Deneen Soares (derm) annually Last visit was  Pt states she only f/u if she has a problem 
   
Continues vit D OTC 2000U daily Reviewed DEXA : osteoporosis, significant decrease in spine and hip Started fosamax over the phone in , however pt states she kept forgetting to take this and so she stopped it No SE eliecer well Advised her to restart taking this weekly, she states she will Pt c/o a cramp in her neck She states this may be from the way she sleeps Overall this sx is mild Reviewed mammo 3/18: nl 
   
ACP not on file. SDMs are [employer]  () or Linda Vanegas (sister). Provided paperwork to complete at home. She will bring in completed copy to office. 
   
PREVENTIVE:   
Colonoscopy: 18 at [facility], will get report for review, pt will email information on this, per pt 5 year repeat Pap:  [address] [place], , every other year Mammogram: [facility], 3/30/18, negative DEXA:18, osteoporosis Tdap: 2/10/2016 Pneumovax: 2014 VCWFCHT27: 18 Shingrix: declines Flu shot: 18 A1c:  nl, 3/17 5.4 Eye exam: CLARISSA TORRES ([phone]), 18 Lipids: 3/18  EK17, new T inversions in the anterior leads Hep C screen: , negative Patient Active Problem List  
 Diagnosis Date Noted  Osteopenia 2017  Essential hypertension 04/06/2016  ACP (advance care planning) 02/10/2016  Symptomatic menopausal or female climacteric states 10/25/2013  Bursitis of shoulder, right 01/05/2012  
 HTN (hypertension) 05/13/2010  Vitiligo 05/13/2010 Current Outpatient Prescriptions Medication Sig Dispense Refill  amLODIPine-benazepril (LOTREL) 10-20 mg per capsule Take 1 Cap by mouth daily. 90 Cap 0  chlorthalidone (HYGROTEN) 25 mg tablet Take 1 Tab by mouth daily. 90 Tab 0  
 alendronate (FOSAMAX) 70 mg tablet Take 1 Tab by mouth every seven (7) days. 12 Tab 1 Past Surgical History:  
Procedure Laterality Date  ENDOSCOPY, COLON, DIAGNOSTIC  2003  
 hyperplastic polyps; 10 year repeat; Dr. Deandra Zarco  FOOT/TOES SURGERY PROC UNLISTED Bone spurs removed R & L foot  HX HEENT  03/2013  
 cataract removal  
 HX LEEP PROCEDURE  1996  [address] [place]  HX TUBAL LIGATION Lab Results Component Value Date/Time WBC 3.4 03/28/2018 11:05 AM  
HGB 12.5 03/28/2018 11:05 AM  
HCT 39.3 03/28/2018 11:05 AM  
PLATELET 405 90/60/2082 11:05 AM  
MCV 93 03/28/2018 11:05 AM  
 
Lab Results Component Value Date/Time Cholesterol, total 178 03/28/2018 11:05 AM  
HDL Cholesterol 58 03/28/2018 11:05 AM  
LDL, calculated 109 (H) 03/28/2018 11:05 AM  
Triglyceride 55 03/28/2018 11:05 AM  
 
Lab Results Component Value Date/Time GFR est non-AA 60 03/28/2018 11:05 AM  
GFR est AA 69 03/28/2018 11:05 AM  
Creatinine 0.97 03/28/2018 11:05 AM  
BUN 22 03/28/2018 11:05 AM  
Sodium 144 03/28/2018 11:05 AM  
Potassium 3.8 03/28/2018 11:05 AM  
Chloride 102 03/28/2018 11:05 AM  
CO2 26 03/28/2018 11:05 AM  
  
 
Review of Systems Respiratory: Negative for shortness of breath. Cardiovascular: Negative for chest pain. Musculoskeletal: Positive for neck pain. Physical Exam  
Constitutional: She is oriented to person, place, and time. She appears well-developed and well-nourished. No distress.   
HENT:  
 Head: Normocephalic and atraumatic. Mouth/Throat: Oropharynx is clear and moist. No oropharyngeal exudate. Eyes: Conjunctivae and EOM are normal. Right eye exhibits no discharge. Left eye exhibits no discharge. Neck: Normal range of motion. Neck supple. Cardiovascular: Normal rate, regular rhythm and normal heart sounds. Exam reveals no gallop and no friction rub. No murmur heard. Pulmonary/Chest: Effort normal and breath sounds normal. No respiratory distress. She has no wheezes. She has no rales. She exhibits no tenderness. Musculoskeletal: Normal range of motion. She exhibits no edema, tenderness or deformity. Lymphadenopathy:  
  She has no cervical adenopathy. Neurological: She is alert and oriented to person, place, and time. Coordination normal.  
Skin: Skin is warm and dry. No rash noted. She is not diaphoretic. No erythema. No pallor. Psychiatric: She has a normal mood and affect. Her behavior is normal.  
 
 
ASSESSMENT and PLAN 
  ICD-10-CM ICD-9-CM 1. Essential hypertension Controlled on lotrel and chlorthalidone, continue no change to dose I10 401.9 LIPID PANEL  
   METABOLIC PANEL, COMPREHENSIVE  
   VITAMIN D, 25 HYDROXY 2. Dyslipidemia Diet controlled, repeat lipids today E78.5 272.4 LIPID PANEL  
   METABOLIC PANEL, COMPREHENSIVE  
   VITAMIN D, 25 HYDROXY 3. Age-related osteoporosis without current pathological fracture New dx, started fosamax over the phone in 4/18, however she only took a couple doses and kept forgetting to take it so she stopped entirely, discussed reasons to treat, she is open to starting this med, will start fosamax weekly for 2 years and then reassess bone density, will check vit D levels to ensure M81.0 733.01 LIPID PANEL  
   METABOLIC PANEL, COMPREHENSIVE  
   VITAMIN D, 25 HYDROXY 4. Vitamin D deficiency Continue OTC vit D 
 E55.9 268.9 LIPID PANEL  
   METABOLIC PANEL, COMPREHENSIVE  
   VITAMIN D, 25 HYDROXY 5. Encounter for immunization Z23 V03.89 ADMIN INFLUENZA VIRUS VAC  
   ADMIN PNEUMOCOCCAL VACCINE INFLUENZA VACCINE INACTIVATED (IIV), SUBUNIT, ADJUVANTED, IM  
   PNEUMOCOCCAL CONJ VACCINE 13 VALENT IM Depression screen reviewed and negative. Scribed by Marcelo Benitez of [facility], as dictated by Dr. Blaise Lynn. Current diagnosis and concerns discussed with pt at length. Pt understands risks and benefits or current treatment plan and medications, and accepts the treatment and medication with any possible risks. Pt asks appropriate questions, which were answered. Pt was instructed to call with any concerns or problems. This note will not be viewable in [address]

## 2018-09-26 NOTE — PROGRESS NOTES
After obtaining consent, and per verbal order from Dr. Nataliia White, patient received influenza vaccine given by Nel Faith LPN in R Deltoid. Influenza Vaccine 0.5 mL IM now. Patient was observed for 10 minutes post injection. Patient tolerated injection well. VIS given. After obtaining consent, and per verbal order from Dr. Nataliia White, patient received prevnar 13 vaccine given by Nel Faith LPN in L Deltoid. Prevnar 13 Vaccine 0.5 mL IM now. Patient was observed for 10 minutes post injection. Patient tolerated injection well. VIS given.

## 2018-09-27 LAB
25(OH)D3+25(OH)D2 SERPL-MCNC: 26.2 NG/ML (ref 30–100)
ALBUMIN SERPL-MCNC: 4.1 G/DL (ref 3.6–4.8)
ALBUMIN/GLOB SERPL: 1.4 {RATIO} (ref 1.2–2.2)
ALP SERPL-CCNC: 80 IU/L (ref 39–117)
ALT SERPL-CCNC: 13 IU/L (ref 0–32)
AST SERPL-CCNC: 18 IU/L (ref 0–40)
BILIRUB SERPL-MCNC: 0.4 MG/DL (ref 0–1.2)
BUN SERPL-MCNC: 22 MG/DL (ref 8–27)
BUN/CREAT SERPL: 22 (ref 12–28)
CALCIUM SERPL-MCNC: 9.6 MG/DL (ref 8.7–10.3)
CHLORIDE SERPL-SCNC: 101 MMOL/L (ref 96–106)
CHOLEST SERPL-MCNC: 182 MG/DL (ref 100–199)
CO2 SERPL-SCNC: 27 MMOL/L (ref 20–29)
CREAT SERPL-MCNC: 1.02 MG/DL (ref 0.57–1)
GLOBULIN SER CALC-MCNC: 3 G/DL (ref 1.5–4.5)
GLUCOSE SERPL-MCNC: 77 MG/DL (ref 65–99)
HDLC SERPL-MCNC: 54 MG/DL
LDLC SERPL CALC-MCNC: 116 MG/DL (ref 0–99)
POTASSIUM SERPL-SCNC: 3.8 MMOL/L (ref 3.5–5.2)
PROT SERPL-MCNC: 7.1 G/DL (ref 6–8.5)
SODIUM SERPL-SCNC: 142 MMOL/L (ref 134–144)
TRIGL SERPL-MCNC: 59 MG/DL (ref 0–149)
VLDLC SERPL CALC-MCNC: 12 MG/DL (ref 5–40)

## 2018-12-21 ENCOUNTER — TELEPHONE (OUTPATIENT)
Dept: INTERNAL MEDICINE CLINIC | Age: 68
End: 2018-12-21

## 2018-12-21 DIAGNOSIS — I99.9 VASCULAR ABNORMALITY: Primary | ICD-10-CM

## 2018-12-21 NOTE — TELEPHONE ENCOUNTER
Called and spoke with Dasia Hirsch, nurse with [facility] [employer] [facility]. Dasia Hirsch stated she did a home visit on pt yesterday and performed a Quantaflo test.  Pt's results came back abnormal showing vascular impairment of feet and ankle. Dasia Hirsch stated that pt is not showing symptoms. Dasia Hirsch stated pt needs to come in and see Dr. Tal Isabel for further evaluation. Notified Dasia Hirsch I would send Dr. Tal Isabel a message and call the pt for an appointment.

## 2018-12-21 NOTE — TELEPHONE ENCOUNTER
Reji Ponce MD   You 39 minutes ago (12:58 PM)     Send to vascular surgeon dr Nichole Putnam  (Routing comment)        Called, spoke to pt. Two identifiers confirmed. Notified pt of Dr. Mackenzie Lima recommendations. Pt requested referral to be mailed to her house. No further questions asked.

## 2018-12-21 NOTE — TELEPHONE ENCOUNTER
Shannon Wood from [facility] is attempting to reach out to office regarding a screening test for mutual patient for \"PBD\" results for test came back abnormal. Madhu is recommending further evaluation at patients next visit.  Caller also stated would be sending office copy of test results Best contact [phone]      Copy/paste [place]over[place]

## 2019-01-09 ENCOUNTER — TELEPHONE (OUTPATIENT)
Dept: INTERNAL MEDICINE CLINIC | Age: 69
End: 2019-01-09

## 2019-03-26 ENCOUNTER — DOCUMENTATION ONLY (OUTPATIENT)
Dept: INTERNAL MEDICINE CLINIC | Age: 69
End: 2019-03-26

## 2019-03-26 NOTE — PROGRESS NOTES
Medicare Part B Preventive Services  Limitations  Recommendation  Scheduled    Bone Mass Measurement  (age 72 & older, biennial)  Requires diagnosis related to osteoporosis or estrogen deficiency. Biennial benefit unless patient has history of long-term glucocorticoid tx or baseline is needed because initial test was by other method  Completed 4/2018      Recommended every 2 years  Due 4/2019   Cardiovascular Screening Blood Tests (every 5 years)  Total cholesterol, HDL, Triglycerides  Order as a panel if possible  Completed 9/2018      Recommended annually  Due 9/2019   Colorectal Cancer Screening  -Fecal occult blood test (annual)  -Flexible sigmoidoscopy (5y)  -Screening colonoscopy (10y)  -Barium Enema     Completed 5/2018      Recommended in 5 years   Due 5/2024     Counseling to Prevent Tobacco Use (up to 8 sessions per year)  - Counseling greater than 3 and up to 10 minutes  - Counseling greater than 10 minutes  Patients must be asymptomatic of tobacco-related conditions to receive as preventive service  N/A  N/A    Diabetes Screening Tests (at least every 3 years, Medicare covers annually or at 6-month intervals for prediabetic patients)      Fasting blood sugar (FBS) or glucose tolerance test (GTT)  Patient must be diagnosed with one of the following:  -Hypertension, Dyslipidemia, obesity, previous impaired FBS or GTT  Or any two of the following: overweight, FH of diabetes, age ? 72, history of gestational diabetes, birth of baby weighing more than 9 pounds  Completed 9/2018 with GLU 77      Typically checked annually Due 9/2019   Diabetes Self-Management Training (DSMT) (no USPSTF recommendation)  Requires referral by treating physician for patient with diabetes or renal disease. 10 hours of initial DSMT session of no less than 30 minutes each in a continuous 12-month period. 2 hours of follow-up DSMT in subsequent years.   N/A  N/A    Glaucoma Screening (no USPSTF recommendation)  Diabetes mellitus, family history, , age 48 or over,  American, age 72 or over  Completed 4/2018     Recommended annually  Due 4/2019   Human Immunodeficiency Virus (HIV) Screening (annually for increased risk patients)  HIV-1 and HIV-2 by EIA, KAMARI, rapid antibody test, or oral mucosa transudate  Patient must be at increased risk for HIV infection per USPSTF guidelines or pregnant. Tests covered annually for patients at increased risk. Pregnant patients may receive up to 3 test during pregnancy. N/A  N/A    Medical Nutrition Therapy (MNT) (for diabetes or renal disease not recommended schedule)  Requires referral by treating physician for patient with diabetes or renal disease. Can be provided in same year as diabetes self-management training (DSMT), and CMS recommends medical nutrition therapy take place after DSMT. Up to 3 hours for initial year and 2 hours in subsequent years. N/A  N/A          Seasonal Influenza Vaccination (annually)     Completed 9/2018     Recommended annually  Due Fall 2019   Pneumococcal Vaccination (once after 72)     Pneumovax - 8/13/2014      Nitvjno10 - 9/26/18     Both recommended once over the age of 72 Completed           Complete    Hepatitis B Vaccinations (if medium/high risk)  Medium/high risk factors:  End-stage renal disease,  Hemophiliacs who received Factor VIII or IX concentrates, Clients of institutions for the mentally retarded, Persons who live in the same house as a HepB virus carrier, Homosexual men, Illicit injectable drug abusers. N/A  N/A    Screening Mammography (biennial age 54-69)?   Annually (age 36 or over)  Completed 3/2018     Recommended annually     Due now   Screening Pap Tests and Pelvic Examination (up to age 79 and after 79 if unknown history or abnormal study last 10 years)  Every 25 months except high risk  Completed 4/2018      Recommended every other year  Due 4/2020   Ultrasound Screening for Abdominal Aortic Aneurysm (AAA) (once) Patient must be referred through [facility] and not have had a screening for abdominal aortic aneurysm before under Medicare.   Limited to patients who meet one of the following criteria:  - Men who are 73-68 years old and have smoked more than 100 cigarettes in their lifetime.  -Anyone with a FH of AAA  -Anyone recommended for screening by USPSTF  N/A  N/A

## 2019-03-27 ENCOUNTER — OFFICE VISIT (OUTPATIENT)
Dept: INTERNAL MEDICINE CLINIC | Age: 69
End: 2019-03-27

## 2019-03-27 VITALS
HEIGHT: 60 IN | WEIGHT: 129 LBS | DIASTOLIC BLOOD PRESSURE: 68 MMHG | TEMPERATURE: 97.9 F | SYSTOLIC BLOOD PRESSURE: 125 MMHG | BODY MASS INDEX: 25.32 KG/M2 | HEART RATE: 71 BPM | OXYGEN SATURATION: 98 % | RESPIRATION RATE: 16 BRPM

## 2019-03-27 DIAGNOSIS — M81.6 LOCALIZED OSTEOPOROSIS WITHOUT CURRENT PATHOLOGICAL FRACTURE: ICD-10-CM

## 2019-03-27 DIAGNOSIS — I10 ESSENTIAL HYPERTENSION: Primary | ICD-10-CM

## 2019-03-27 DIAGNOSIS — E78.2 MIXED HYPERLIPIDEMIA: ICD-10-CM

## 2019-03-27 DIAGNOSIS — M85.80 OSTEOPENIA, UNSPECIFIED LOCATION: ICD-10-CM

## 2019-03-27 DIAGNOSIS — E55.9 VITAMIN D DEFICIENCY: ICD-10-CM

## 2019-03-27 DIAGNOSIS — Z00.00 MEDICARE ANNUAL WELLNESS VISIT, SUBSEQUENT: ICD-10-CM

## 2019-03-27 RX ORDER — AMLODIPINE AND BENAZEPRIL HYDROCHLORIDE 10; 20 MG/1; MG/1
1 CAPSULE ORAL DAILY
Qty: 90 CAP | Refills: 1 | Status: SHIPPED | OUTPATIENT
Start: 2019-03-27 | End: 2019-10-31 | Stop reason: SDUPTHER

## 2019-03-27 RX ORDER — CHOLECALCIFEROL (VITAMIN D3) 125 MCG
CAPSULE ORAL
COMMUNITY
End: 2022-08-18 | Stop reason: ALTCHOICE

## 2019-03-27 RX ORDER — CHLORTHALIDONE 25 MG/1
25 TABLET ORAL DAILY
Qty: 90 TAB | Refills: 1 | Status: SHIPPED | OUTPATIENT
Start: 2019-03-27 | End: 2019-10-31 | Stop reason: SDUPTHER

## 2019-03-27 NOTE — PROGRESS NOTES
This is the Subsequent Medicare Annual Wellness Exam, performed 12 months or more after the Initial AWV or the last Subsequent AWV    I have reviewed the patient's medical history in detail and updated the computerized patient record. History     Past Medical History:   Diagnosis Date    HTN (hypertension) 5/13/2010    Vitiligo 5/13/2010    ALEXUS Walters      Past Surgical History:   Procedure Laterality Date    ENDOSCOPY, COLON, DIAGNOSTIC  2003    hyperplastic polyps; 10 year repeat; Dr. Sanjay Terry    FOOT/TOES SURGERY PROC UNLISTED      Bone spurs removed R & L foot     HX HEENT  03/2013    cataract removal    HX LEEP PROCEDURE  1996    Dr. Marisol Cunningham HX TUBAL LIGATION       Current Outpatient Medications   Medication Sig Dispense Refill    cholecalciferol, vitamin D3, (VITAMIN D3) 2,000 unit tab Take  by mouth.  MULTIVITAMIN PO Take  by mouth.  amLODIPine-benazepril (LOTREL) 10-20 mg per capsule Take 1 Cap by mouth daily. 90 Cap 1    chlorthalidone (HYGROTEN) 25 mg tablet Take 1 Tab by mouth daily. 90 Tab 1    alendronate (FOSAMAX) 70 mg tablet Take 1 Tab by mouth every seven (7) days.  12 Tab 1     Allergies   Allergen Reactions    Tramadol Nausea Only     Family History   Problem Relation Age of Onset    Hypertension Sister     Stroke Sister 61    Diabetes Sister     Tuberculosis Sister     Stroke Mother 80        TIA    Cancer Father         Lung cancer     Social History     Tobacco Use    Smoking status: Never Smoker    Smokeless tobacco: Never Used   Substance Use Topics    Alcohol use: Yes     Comment: occasional     Patient Active Problem List   Diagnosis Code    HTN (hypertension) I10    Vitiligo L80    Bursitis of shoulder, right M75.51    Symptomatic menopausal or female climacteric states N95.1    ACP (advance care planning) Z71.89    Essential hypertension I10    Osteopenia M85.80       Depression Risk Factor Screening:     3 most recent PHQ Screens 3/27/2019 Little interest or pleasure in doing things Not at all   Feeling down, depressed, irritable, or hopeless Not at all   Total Score PHQ 2 0     Alcohol Risk Factor Screening: You do not drink alcohol or very rarely. Functional Ability and Level of Safety:   Hearing Loss  Hearing is good. Activities of Daily Living  The home contains: no safety equipment. Patient does total self care    Fall Risk  Fall Risk Assessment, last 12 mths 3/27/2019   Able to walk? Yes   Fall in past 12 months? No       Abuse Screen  Patient is not abused  Lives with   Cognitive Screening   Evaluation of Cognitive Function:  Has your family/caregiver stated any concerns about your memory: no  Normal    Patient Care Team   Patient Care Team:  Merlinda Riches, MD as PCP - General (Internal Medicine)  Tejinder Sandy MD (Dermatology)  Bhanu Flores MD (Gastroenterology)  Jose Benjamin MD (Gynecology)  Masha Guallpa RN as Nurse Navigator  Natanael Howard MD (Cardiology)  Jimena Bagley MD (Vascular Surgery)   updated    Assessment/Plan   Education and counseling provided:  Are appropriate based on today's review and evaluation  End-of-Life planning (with patient's consent)  Screening Mammography  Screening for glaucoma  Diabetes screening test    Diagnoses and all orders for this visit:    1. Medicare annual wellness visit, subsequent    Other orders  -     amLODIPine-benazepril (LOTREL) 10-20 mg per capsule; Take 1 Cap by mouth daily. -     chlorthalidone (HYGROTEN) 25 mg tablet; Take 1 Tab by mouth daily. Health Maintenance Due   Topic Date Due    Shingrix Vaccine Age 49> (1 of 2) 08/20/1999    GLAUCOMA SCREENING Q2Y  08/20/2014    MEDICARE YEARLY EXAM  03/29/2019    BREAST CANCER SCRN MAMMOGRAM  05/02/2019     Discussed with patient about advance medical directive. Provided patient blank AMD and Your Right to Decide Booklet. Requested that if completed to provide a copy of AMD to office. ACP not on file. SDMs are [employer]  () or her daughter. Provided information today.        Colonoscopy: 18 at [facility], will get report for review, pt will email information on this, per pt 5 year repeat  Pap: Dr. Arianna lang, , every other year   Mammogram: [facility], 3/30/18, negative, scheduled for 3/27/19  DEXA: 18, osteoporosis every other year    Tdap: 2/10/2016  Pneumovax: 2014  Fdbefiu40: 18  Shingrix: declines  Flu shot: 18     Eye exam: Fidelia Bedolla ([phone]), 18 repeat scheduled    A1c:  3/17 5.4 fasting glucose ordered  Lipids:   annual    EK17, new T inversions in the anterior leads    Hep C screen: , negative    Medication reconciliation completed by MA and reviewed by me. Medical/surgical/social/family history reviewed and updated by me. Patient provided AVS and preventative screening table. Patient verbalized understanding of all information discussed.

## 2019-03-27 NOTE — PROGRESS NOTES
HISTORY OF PRESENT ILLNESS  Jose Sanchez is a 70 y.o. female. HPI  Last here 18. Pt is here to f/u on chronic conditions.      BP is 125/68  BP at home 117/67, 107/64, 131/72, 119/69, 123/74  Continues lotrel 10-20mg and chlorthalidone 25mg daily     Wt today is 129 lbs --stable x lov  Pt is attending exercises classes twice a week  Her wt is close to nl range      Reviewed labs      In the past, pt c/o palpitations, some SOB and cough  States sx resolved  Pt also saw Dr. Mary Pandya (cardio) for her sx  Last visit was10/3/17  Her sx have since resolved   States she has had no further issues      Pt follows with Dr. Carey Cohen (derm) annually   Last visit was    Pt states she only f/u if she has a problem    Pt saw Dr Alvarado Last (vascular)  She states she went after there was something wrong with her leg found by [facility] nurse  They did some testing and per pt pain in her thigh was decided to just be arthritis  This has improved      Continues vit D OTC 2000U daily   Fosamax was started in , but pt stopped taking this after trying twice  She stated that she \"feels good without it\"  Will reassess after her DEXA next year    Fully functional independently      ACP not on file. SDMs are [employer]  () or her daughter.    Provided information today.      PREVENTIVE:    Colonoscopy: 18 at [facility], will get report for review, pt will email information on this, per pt 5 year repeat  Pap: Dr. Autumn Rodriguez, , every other year   Mammogram: [facility], 3/30/18, negative, scheduled for 3/27/19  DEXA: 18, osteoporosis  Tdap: 2/10/2016  Pneumovax: 2014  Wyizylq29: 18  Shingrix: declines  Flu shot: 18   A1c:  nl, 3/17 5.4  Eye exam: CLARISSA TORRES ([phone]), 18  Lipids:    EK17, new T inversions in the anterior leads  Hep C screen: , negative    Patient Active Problem List    Diagnosis Date Noted    Osteopenia 2017    Essential hypertension 04/06/2016    ACP (advance care planning) 02/10/2016    Symptomatic menopausal or female climacteric states 10/25/2013    Bursitis of shoulder, right 01/05/2012    HTN (hypertension) 05/13/2010    Vitiligo 05/13/2010     Current Outpatient Medications   Medication Sig Dispense Refill    cholecalciferol, vitamin D3, (VITAMIN D3) 2,000 unit tab Take  by mouth.  MULTIVITAMIN PO Take  by mouth.  amLODIPine-benazepril (LOTREL) 10-20 mg per capsule Take 1 Cap by mouth daily. 90 Cap 1    chlorthalidone (HYGROTEN) 25 mg tablet Take 1 Tab by mouth daily. 90 Tab 1    alendronate (FOSAMAX) 70 mg tablet Take 1 Tab by mouth every seven (7) days. 12 Tab 1     Past Surgical History:   Procedure Laterality Date    ENDOSCOPY, COLON, DIAGNOSTIC  2003    hyperplastic polyps; 10 year repeat; Dr. Priest Blue    FOOT/TOES SURGERY [facility]      Bone spurs removed R & L foot     HX HEENT  03/2013    cataract removal    HX LEEP PROCEDURE  1996    Dr. Luzmaria Wild    HX TUBAL LIGATION        Lab Results   Component Value Date/Time    WBC 3.4 03/28/2018 11:05 AM    HGB 12.5 03/28/2018 11:05 AM    HCT 39.3 03/28/2018 11:05 AM    PLATELET 415 44/14/7218 11:05 AM    MCV 93 03/28/2018 11:05 AM     Lab Results   Component Value Date/Time    Cholesterol, total 182 09/26/2018 09:03 AM    HDL Cholesterol 54 09/26/2018 09:03 AM    LDL, calculated 116 (H) 09/26/2018 09:03 AM    Triglyceride 59 09/26/2018 09:03 AM     Lab Results   Component Value Date/Time    GFR est non-AA 56 (L) 09/26/2018 09:03 AM    GFR est AA 65 09/26/2018 09:03 AM    Creatinine 1.02 (H) 09/26/2018 09:03 AM    BUN 22 09/26/2018 09:03 AM    Sodium 142 09/26/2018 09:03 AM    Potassium 3.8 09/26/2018 09:03 AM    Chloride 101 09/26/2018 09:03 AM    CO2 27 09/26/2018 09:03 AM        Review of Systems   Respiratory: Negative for shortness of breath. Cardiovascular: Negative for chest pain.        Physical Exam   Constitutional: She is oriented to person, place, and time. She appears well-developed and well-nourished. No distress. HENT:   Head: Normocephalic and atraumatic. Right Ear: External ear normal.   Left Ear: External ear normal.   Mouth/Throat: Oropharynx is clear and moist. No oropharyngeal exudate. Eyes: Pupils are equal, round, and reactive to light. Conjunctivae and EOM are normal. Right eye exhibits no discharge. Left eye exhibits no discharge. No scleral icterus. Neck: Normal range of motion. Neck supple. Cardiovascular: Normal rate, regular rhythm, normal heart sounds and intact distal pulses. Exam reveals no gallop and no friction rub. No murmur heard. Pulmonary/Chest: Effort normal and breath sounds normal. No respiratory distress. She has no wheezes. She has no rales. She exhibits no tenderness. Abdominal: Soft. She exhibits no distension and no mass. There is no tenderness. There is no rebound and no guarding. Musculoskeletal: Normal range of motion. She exhibits no edema, tenderness or deformity. Lymphadenopathy:     She has no cervical adenopathy. Neurological: She is alert and oriented to person, place, and time. Coordination normal.   Skin: Skin is warm and dry. No rash noted. She is not diaphoretic. No erythema. No pallor. Psychiatric: She has a normal mood and affect. Her behavior is normal.       ASSESSMENT and PLAN    ICD-10-CM ICD-9-CM    1. Essential hypertension    Well controlled on lotrel 10-20 and chlorthalidone 25mg daily, continue no change to dose   Y90 510.5 METABOLIC PANEL, COMPREHENSIVE      LIPID PANEL      CBC W/O DIFF      TSH 3RD GENERATION   2. Medicare annual wellness visit, subsequent T43.50 E38.3 METABOLIC PANEL, COMPREHENSIVE      LIPID PANEL      CBC W/O DIFF      TSH 3RD GENERATION   3.  Osteopenia, unspecified location    Actually progressed to osteoporosis, she transiently started fosamax, only took a few tabs and decided she did not like the way it felt so she stopped taking it, will repeat DEXA next year and determine additional tx at that time, for now continue with vit D   G02.62 909.79 METABOLIC PANEL, COMPREHENSIVE      LIPID PANEL      CBC W/O DIFF      TSH 3RD GENERATION   4. Vitamin D deficiency    Check level and treat prn, currently taking 2000U per day   Y68.4 658.4 METABOLIC PANEL, COMPREHENSIVE      LIPID PANEL      CBC W/O DIFF      TSH 3RD GENERATION      VITAMIN D, 25 HYDROXY   5. Mixed hyperlipidemia    Diet controlled, repeat lipids   V74.4 109.9 METABOLIC PANEL, COMPREHENSIVE      LIPID PANEL      CBC W/O DIFF      TSH 3RD GENERATION        Depression screen reviewed and negative. Scribed by Eren Colby of [address] [place] [address] [address], as dictated by Dr. Isaiah Hardin. Current diagnosis and concerns discussed with pt at length. Pt understands risks and benefits or current treatment plan and medications, and accepts the treatment and medication with any possible risks. Pt asks appropriate questions, which were answered. Pt was instructed to call with any concerns or problems. I have reviewed the note documented by the scribe. The services provided are my own.   The documentation is accurate

## 2019-03-27 NOTE — PATIENT INSTRUCTIONS
Medicare Part B Preventive Services  Limitations  Recommendation  Scheduled    Bone Mass Measurement  (age 72 & older, biennial)  Requires diagnosis related to osteoporosis or estrogen deficiency. Biennial benefit unless patient has history of long-term glucocorticoid tx or baseline is needed because initial test was by other method  Completed 4/2018     Recommended every 2 years  Due 4/2019   Cardiovascular Screening Blood Tests (every 5 years)  Total cholesterol, HDL, Triglycerides  Order as a panel if possible  Completed 9/2018     Recommended annually  Due 9/2019   Colorectal Cancer Screening  -Fecal occult blood test (annual)  -Flexible sigmoidoscopy (5y)  -Screening colonoscopy (10y)  -Barium Enema    Completed 5/2018     Recommended in 5 years   Due 5/2024     Counseling to Prevent Tobacco Use (up to 8 sessions per year)  - Counseling greater than 3 and up to 10 minutes  - Counseling greater than 10 minutes  Patients must be asymptomatic of tobacco-related conditions to receive as preventive service  N/A  N/A    Diabetes Screening Tests (at least every 3 years, Medicare covers annually or at 6-month intervals for prediabetic patients)     Fasting blood sugar (FBS) or glucose tolerance test (GTT)  Patient must be diagnosed with one of the following:  -Hypertension, Dyslipidemia, obesity, previous impaired FBS or GTT  Or any two of the following: overweight, FH of diabetes, age ? 72, history of gestational diabetes, birth of baby weighing more than 9 pounds  Completed 9/2018 with GLU 68     Typically checked annually Due 9/2019   Diabetes Self-Management Training (DSMT) (no USPSTF recommendation)  Requires referral by treating physician for patient with diabetes or renal disease.  10 hours of initial DSMT session of no less than 30 minutes each in a continuous 12-month period.  2 hours of follow-up DSMT in subsequent years.  N/A  N/A    Glaucoma Screening (no USPSTF recommendation)  Diabetes mellitus, family history, , age 48 or over,  American, age 72 or over  Completed 4/2018     Recommended annually  Due 4/2019   Human Immunodeficiency Virus (HIV) Screening (annually for increased risk patients)  HIV-1 and HIV-2 by EIA, KAMARI, rapid antibody test, or oral mucosa transudate  Patient must be at increased risk for HIV infection per USPSTF guidelines or pregnant.  Tests covered annually for patients at increased risk.  Pregnant patients may receive up to 3 test during pregnancy.  N/A  N/A    Medical Nutrition Therapy (MNT) (for diabetes or renal disease not recommended schedule)  Requires referral by treating physician for patient with diabetes or renal disease.  Can be provided in same year as diabetes self-management training (DSMT), and CMS recommends medical nutrition therapy take place after DSMT.  Up to 3 hours for initial year and 2 hours in subsequent years.  N/A  N/A              Seasonal Influenza Vaccination (annually)    Completed 9/2018     Recommended annually  Due Fall 2019   Pneumococcal Vaccination (once after 65)    Pneumovax - 8/13/2014     Roxanne Snellen - 9/26/18     Both recommended once over the age of 72 Completed         Complete    Hepatitis B Vaccinations (if medium/high risk)  Medium/high risk factors:  End-stage renal disease,  Hemophiliacs who received Factor VIII or IX concentrates, Clients of institutions for the mentally retarded, Persons who live in the same house as a HepB virus carrier, Homosexual men, Illicit injectable drug abusers.  N/A  N/A    Screening Mammography (biennial age 50-74)?  Annually (age 36 or over)  Completed 3/2018     Recommended annually    Due now--scheduled   Screening Pap Tests and Pelvic Examination (up to age 79 and after 79 if unknown history or abnormal study last 8 years)  Every 24 months except high risk  Completed 4/2018     Recommended every other year  Due 4/2020   Ultrasound Screening for Abdominal Aortic Aneurysm (AAA) (once)  Patient must be referred through [facility] and not have had a screening for abdominal aortic aneurysm before under Medicare.  Limited to patients who meet one of the following criteria:  - Men who are 73-68 years old and have smoked more than 100 cigarettes in their lifetime.  -Anyone with a FH of AAA  -Anyone recommended for screening by USPSTF  N/A  N/A         Medicare Wellness Visit, Female     The best way to live healthy is to have a lifestyle where you eat a well-balanced diet, exercise regularly, limit alcohol use, and quit all forms of tobacco/nicotine, if applicable. Regular preventive services are another way to keep healthy. Preventive services (vaccines, screening tests, monitoring & exams) can help personalize your care plan, which helps you manage your own care. Screening tests can find health problems at the earliest stages, when they are easiest to treat. [facility] follows the current, evidence-based guidelines published by the [facility] [employer]abjit White (USPSTF) when recommending preventive services for our patients. Because we follow these guidelines, sometimes recommendations change over time as research supports it. (For example, mammograms used to be recommended annually. Even though Medicare will still pay for an annual mammogram, the newer guidelines recommend a mammogram every two years for women of average risk.)  Of course, you and your doctor may decide to screen more often for some diseases, based on your risk and your health status. Preventive services for you include:  - Medicare offers their members a free annual wellness visit, which is time for you and your primary care provider to discuss and plan for your preventive service needs. Take advantage of this benefit every year!  -All adults over the age of 72 should receive the recommended pneumonia vaccines.  Current USPSTF guidelines recommend a series of two vaccines for the best pneumonia protection.   -All adults should have a flu vaccine yearly and a tetanus vaccine every 10 years. All adults age 61 and older should receive a shingles vaccine once in their lifetime.    -A bone mass density test is recommended when a woman turns 65 to screen for osteoporosis. This test is only recommended one time, as a screening. Some providers will use this same test as a disease monitoring tool if you already have osteoporosis. -All adults age 38-68 who are overweight should have a diabetes screening test once every three years.   -Other screening tests and preventive services for persons with diabetes include: an eye exam to screen for diabetic retinopathy, a kidney function test, a foot exam, and stricter control over your cholesterol.   -Cardiovascular screening for adults with routine risk involves an electrocardiogram (ECG) at intervals determined by your doctor.   -Colorectal cancer screenings should be done for adults age 54-65 with no increased risk factors for colorectal cancer. There are a number of acceptable methods of screening for this type of cancer. Each test has its own benefits and drawbacks. Discuss with your doctor what is most appropriate for you during your annual wellness visit. The different tests include: colonoscopy (considered the best screening method), a fecal occult blood test, a fecal DNA test, and sigmoidoscopy. -Breast cancer screenings are recommended every other year for women of normal risk, age 54-69.  -Cervical cancer screenings for women over age 72 are only recommended with certain risk factors.   -All adults born between [facility] should be screened once for Hepatitis C.      Here is a list of your current Health Maintenance items (your personalized list of preventive services) with a due date:  Health Maintenance Due   Topic Date Due    Shingles Vaccine (1 of 2) 08/20/1999    Glaucoma Screening   08/20/2014    Annual Well Visit  03/29/2019    Mammogram  05/02/2019

## 2019-03-28 LAB
25(OH)D3+25(OH)D2 SERPL-MCNC: 40.3 NG/ML (ref 30–100)
ALBUMIN SERPL-MCNC: 4.2 G/DL (ref 3.6–4.8)
ALBUMIN/GLOB SERPL: 1.4 {RATIO} (ref 1.2–2.2)
ALP SERPL-CCNC: 81 IU/L (ref 39–117)
ALT SERPL-CCNC: 15 IU/L (ref 0–32)
AST SERPL-CCNC: 19 IU/L (ref 0–40)
BILIRUB SERPL-MCNC: 0.4 MG/DL (ref 0–1.2)
BUN SERPL-MCNC: 18 MG/DL (ref 8–27)
BUN/CREAT SERPL: 20 (ref 12–28)
CALCIUM SERPL-MCNC: 9.5 MG/DL (ref 8.7–10.3)
CHLORIDE SERPL-SCNC: 104 MMOL/L (ref 96–106)
CHOLEST SERPL-MCNC: 176 MG/DL (ref 100–199)
CO2 SERPL-SCNC: 25 MMOL/L (ref 20–29)
CREAT SERPL-MCNC: 0.91 MG/DL (ref 0.57–1)
ERYTHROCYTE [DISTWIDTH] IN BLOOD BY AUTOMATED COUNT: 12.9 % (ref 12.3–15.4)
GLOBULIN SER CALC-MCNC: 2.9 G/DL (ref 1.5–4.5)
GLUCOSE SERPL-MCNC: 88 MG/DL (ref 65–99)
HCT VFR BLD AUTO: 40.7 % (ref 34–46.6)
HDLC SERPL-MCNC: 57 MG/DL
HGB BLD-MCNC: 12.9 G/DL (ref 11.1–15.9)
LDLC SERPL CALC-MCNC: 111 MG/DL (ref 0–99)
MCH RBC QN AUTO: 29.6 PG (ref 26.6–33)
MCHC RBC AUTO-ENTMCNC: 31.7 G/DL (ref 31.5–35.7)
MCV RBC AUTO: 93 FL (ref 79–97)
PLATELET # BLD AUTO: 268 X10E3/UL (ref 150–379)
POTASSIUM SERPL-SCNC: 3.8 MMOL/L (ref 3.5–5.2)
PROT SERPL-MCNC: 7.1 G/DL (ref 6–8.5)
RBC # BLD AUTO: 4.36 X10E6/UL (ref 3.77–5.28)
SODIUM SERPL-SCNC: 144 MMOL/L (ref 134–144)
TRIGL SERPL-MCNC: 41 MG/DL (ref 0–149)
TSH SERPL DL<=0.005 MIU/L-ACNC: 1.41 UIU/ML (ref 0.45–4.5)
VLDLC SERPL CALC-MCNC: 8 MG/DL (ref 5–40)
WBC # BLD AUTO: 3.5 X10E3/UL (ref 3.4–10.8)

## 2019-04-24 ENCOUNTER — OFFICE VISIT (OUTPATIENT)
Dept: SURGERY | Age: 69
End: 2019-04-24

## 2019-04-24 ENCOUNTER — HOSPITAL ENCOUNTER (OUTPATIENT)
Dept: LAB | Age: 69
Discharge: HOME | End: 2019-04-24
Payer: MEDICARE

## 2019-04-24 VITALS
SYSTOLIC BLOOD PRESSURE: 106 MMHG | DIASTOLIC BLOOD PRESSURE: 71 MMHG | OXYGEN SATURATION: 98 % | WEIGHT: 127 LBS | BODY MASS INDEX: 24.94 KG/M2 | RESPIRATION RATE: 18 BRPM | TEMPERATURE: 97.7 F | HEIGHT: 60 IN | HEART RATE: 73 BPM

## 2019-04-24 DIAGNOSIS — Z01.419 ENCOUNTER FOR GYNECOLOGICAL EXAMINATION WITHOUT ABNORMAL FINDING: Primary | ICD-10-CM

## 2019-04-24 DIAGNOSIS — M81.0 OSTEOPOROSIS WITHOUT CURRENT PATHOLOGICAL FRACTURE, UNSPECIFIED OSTEOPOROSIS TYPE: ICD-10-CM

## 2019-04-24 DIAGNOSIS — Z12.4 PAP SMEAR FOR CERVICAL CANCER SCREENING: ICD-10-CM

## 2019-04-24 PROCEDURE — 88175 CYTOPATH C/V AUTO FLUID REDO: CPT

## 2019-04-24 NOTE — PATIENT INSTRUCTIONS
Learning About Cervical Cancer Screening What is a cervical cancer screening test? 
 
Cervical cancer screening tests check for cancer of the cervix. The cervix is the lower part of the uterus that opens into the vagina. The test can help your doctor find early changes in the cells that could lead to cancer. Two tests can be used to screen for cervical cancer. They may be used alone or together. A Pap test.  
This test looks for changes in the cells of the cervix. Abnormal cells may be a sign of cancer. A human papillomavirus (HPV) test.  
This test looks for the HPV virus. Some types of HPV can cause cancer. During either test, the doctor or nurse will insert a tool called a speculum into your vagina. The speculum gently spreads apart the vaginal walls. It allows your doctor to see inside the vagina and the cervix. He or she uses a small swab or brush to collect cell samples from your cervix. Try to schedule the test when you're not having your period. To get ready for the test, avoid douches, tampons, vaginal medicines, sprays, and powders for at least a day before you have the test. 
When should you have a screening test? 
These guidelines apply to women who are at average risk of cervical cancer. If you don't know your risk, talk with your doctor. Women 21 to 34 
· You can start having a Pap test at age 24. It is done every 3 years. · You can start having the primary HPV test at age 22. It is done every 3 years. Women 30 to 59 · If you had both a Pap test and an HPV test last time and both were normal, you can have a Pap plus an HPV test every 5 years. · If you had only a Pap test last time, as long as your results are normal, you can have a Pap test every 3 years. · If you had only an HPV test last time, as long as your results are normal, you can have an HPV test every 3 years. Women 72 and older · If you are age 72 or older, talk with your doctor about what's right for you. Women ages 72 and older may no longer need to be screened for cervical cancer. When to stop having screening tests depends on your medical history, your overall health, and your risk of cervical cell changes or cervical cancer. What happens after the test? 
The sample of cells taken during your test will be sent to a lab so that an expert can look at the cells. It usually takes a week or two to get the results back. Pap tests · A normal result means that the test didn't find any abnormal cells in the sample. · An abnormal result can mean many things. Most of these aren't cancer. The results of your test may be abnormal because: ? You have an infection of the vagina or cervix, such as a yeast infection. ? You have an IUD (intrauterine device for birth control). ? You have low estrogen levels after menopause that are causing the cells to change. ? You have cell changes that may be a sign of precancer or cancer. The results are ranked based on how serious the changes might be. HPV tests · A normal result means that the test didn't find any high-risk HPV in the sample. · An abnormal HPV test doesn't mean that you have cervical cancer. It may mean that you are infected with one or more high-risk types of HPV. This increases your chance of having cell changes that may be a sign of precancer or cancer. Follow-up care is a key part of your treatment and safety. Be sure to make and go to all appointments, and call your doctor if you are having problems. It's also a good idea to know your test results and keep a list of the medicines you take. Where can you learn more? Go to http://aracely-conrado.info/. Enter P919 in the search box to learn more about \"Learning About Cervical Cancer Screening. \" Current as of: March 27, 2018 Content Version: 11.9 © 4539-9456 [employer].  Care instructions adapted under license by [employer] (which disclaims liability or warranty for this information). If you have questions about a medical condition or this instruction, always ask your healthcare professional. [address] any warranty or liability for your use of this information. Well Visit, Over 72: Care Instructions Your Care Instructions Physical exams can help you stay healthy. Your doctor has checked your overall health and may have suggested ways to take good care of yourself. He or she also may have recommended tests. At home, you can help prevent illness with healthy eating, regular exercise, and other steps. Follow-up care is a key part of your treatment and safety. Be sure to make and go to all appointments, and call your doctor if you are having problems. It's also a good idea to know your test results and keep a list of the medicines you take. How can you care for yourself at home? · Reach and stay at a healthy weight. This will lower your risk for many problems, such as obesity, diabetes, heart disease, and high blood pressure. · Get at least 30 minutes of exercise on most days of the week. Walking is a good choice. You also may want to do other activities, such as running, swimming, cycling, or playing tennis or team sports. · Do not smoke. Smoking can make health problems worse. If you need help quitting, talk to your doctor about stop-smoking programs and medicines. These can increase your chances of quitting for good. · Protect your skin from too much sun. When you're outdoors from 10 a.m. to 4 p.m., stay in the shade or cover up with clothing and a hat with a wide brim. Wear sunglasses that block UV rays. Even when it's cloudy, put broad-spectrum sunscreen (SPF 30 or higher) on any exposed skin. · See a dentist one or two times a year for checkups and to have your teeth cleaned. · Wear a seat belt in the car. · Limit alcohol to 2 drinks a day for men and 1 drink a day for women. Too much alcohol can cause health problems. Follow your doctor's advice about when to have certain tests. These tests can spot problems early. For men and women · Cholesterol. Your doctor will tell you how often to have this done based on your overall health and other things that can increase your risk for heart attack and stroke. · Blood pressure. Have your blood pressure checked during a routine doctor visit. Your doctor will tell you how often to check your blood pressure based on your age, your blood pressure results, and other factors. · Diabetes. Ask your doctor whether you should have tests for diabetes. · Vision. Experts recommend that you have yearly exams for glaucoma and other age-related eye problems. · Hearing. Tell your doctor if you notice any change in your hearing. You can have tests to find out how well you hear. · Colon cancer tests. Keep having colon cancer tests as your doctor recommends. You can have one of several types of tests. · Heart attack and stroke risk. At least every 4 to 6 years, you should have your risk for heart attack and stroke assessed. Your doctor uses factors such as your age, blood pressure, cholesterol, and whether you smoke or have diabetes to show what your risk for a heart attack or stroke is over the next 10 years. · Osteoporosis. Talk to your doctor about whether you should have a bone density test to find out whether you have thinning bones. Also ask your doctor about whether you should take calcium and vitamin D supplements. For women · Pap test and pelvic exam. You may no longer need a Pap test. Talk with your doctor about whether to stop or continue to have Pap tests. · Breast exam and mammogram. Ask how often you should have a mammogram, which is an X-ray of your breasts. A mammogram can spot breast cancer before it can be felt and when it is easiest to treat. · Thyroid disease.  Talk to your doctor about whether to have your thyroid checked as part of a regular physical exam. Women have an increased chance of a thyroid problem. For men · Prostate exam. Talk to your doctor about whether you should have a blood test (called a PSA test) for prostate cancer. Experts disagree on whether men should have this test. Some experts recommend that you discuss the benefits and risks of the test with your doctor. · Abdominal aortic aneurysm. Ask your doctor whether you should have a test to check for an aneurysm. You may need a test if you ever smoked or if your parent, brother, sister, or child has had an aneurysm. When should you call for help? Watch closely for changes in your health, and be sure to contact your doctor if you have any problems or symptoms that concern you. Where can you learn more? Go to http://aracely-conrado.info/. Enter A978 in the search box to learn more about \"Well Visit, Over 65: Care Instructions. \" Current as of: March 28, 2018 Content Version: 11.9 © 4051-0824 [employer]. Care instructions adapted under license by [employer] (which disclaims liability or warranty for this information). If you have questions about a medical condition or this instruction, always ask your healthcare professional. [place] [address] any warranty or liability for your use of this information. Osteoporosis: Care Instructions Your Care Instructions Osteoporosis causes bones to become thin and weak. It is much more common in women than in men. Osteoporosis may be very advanced before you know you have it. Sometimes the first sign is a broken bone in the hip, spine, or wrist or sudden pain in your middle or lower back. Follow-up care is a key part of your treatment and safety. Be sure to make and go to all appointments, and call your doctor if you are having problems. It's also a good idea to know your test results and keep a list of the medicines you take. How can you care for yourself at home? · Your doctor may prescribe a bisphosphonate, such as risedronate (Actonel) or alendronate (Fosamax), for osteoporosis. If you are taking one of these medicines by mouth: 
? Take your medicine with a full glass of water when you first get up in the morning. ? Do not lie down, eat, drink a beverage, or take any other medicine for at least 30 minutes after taking the drug. This helps prevent stomach problems. ? Do not take your medicine late in the day if you forgot to take it in the morning. Skip it, and take the usual dose the next morning. ? If you have side effects, tell your doctor. He or she may prescribe another medicine. · Get enough calcium and vitamin D. The [employer] recommends adults younger than age 46 need 1,000 mg of calcium and 600 IU of vitamin D each day. Women ages 46 to 79 need 1,200 mg of calcium and 600 IU of vitamin D each day. Men ages 46 to 79 need 1,000 mg of calcium and 600 IU of vitamin D each day. Adults 71 and older need 1,200 mg of calcium and 800 IU of vitamin D each day. ? Eat foods rich in calcium, like yogurt, cheese, milk, and dark green vegetables. This is a good way to get the calcium you need. You can get vitamin D from eggs, fatty fish, cereal, and milk. ? Talk to your doctor about taking a calcium plus vitamin D supplement. Be careful, though. Adults ages 23 to 48 should not get more than 2,500 mg of calcium and 4,000 IU of vitamin D each day, whether it is from supplements and/or food. Adults ages 46 and older should not get more than 2,000 mg of calcium and 4,000 IU of vitamin D each day from supplements and/or food. · Limit alcohol to 2 drinks a day for men and 1 drink a day for women. Too much alcohol can cause health problems. · Do not smoke. Smoking puts you at a much higher risk for osteoporosis.  If you need help quitting, talk to your doctor about stop-smoking programs and medicines. These can increase your chances of quitting for good. · Get regular bone-building exercise. Weight-bearing and resistance exercises keep bones healthy by working the muscles and bones against gravity. Start out at an exercise level that feels right for you. Add a little at a time until you can do the following: ? Do 30 minutes of weight-bearing exercise on most days of the week. Walking, jogging, stair climbing, and dancing are good choices. ? Do resistance exercises with weights or elastic bands 2 to 3 days a week. · Reduce your risk of falls: 
? Wear supportive shoes with low heels and nonslip soles. ? Use a cane or walker, if you need it. Use shower chairs and bath benches. Put in handrails on stairways, around your shower or tub area, and near the toilet. ? Keep stairs, porches, and walkways well lit. Use night-lights. ? Remove throw rugs and other objects that are in the way. ? Avoid icy, wet, or slippery surfaces. ? Keep a cordless phone and a flashlight with new batteries by your bed. When should you call for help? Watch closely for changes in your health, and be sure to contact your doctor if you have any problems. Where can you learn more? Go to http://aracely-conrado.info/. Enter K100 in the search box to learn more about \"Osteoporosis: Care Instructions. \" Current as of: March 15, 2018 Content Version: 11.9 © 8826-1055 [employer]. Care instructions adapted under license by [employer] (which disclaims liability or warranty for this information). If you have questions about a medical condition or this instruction, always ask your healthcare professional. [place] [address] any warranty or liability for your use of this information.

## 2019-04-24 NOTE — PROGRESS NOTES
Annual exam ages 69+ Brendalyn Castleman is a ,  70 y.o. female [address] Patient's last menstrual period was 06/10/2006. She presents for her annual checkup. She is having no significant problems. With regard to the Gardasil vaccine, she is older than the age for which it is FDA approved. Hormonal status: 
Postmenopausal 
She is not having vasomotor symptoms. The patient is not using any ERT. Sexual history: She  reports that she currently engages in sexual activity and has had partners who are Male. She reports using the following method of birth control/protection: Surgical. 
 
Medical conditions: 
 
Since her last annual GYN exam about one year ago, she has not the following changes in her health history: none. Pap and Mammogram History: 
 
Her most recent Pap smear was  - normal.  Hx of LEEP in . Normal paps since. The patient with normal mammogram 3/29/19. Breast Cancer History/Substance Abuse: negative Osteoporosis History: 
 
Family history does not include a first or second degree relative with osteopenia or osteoporosis. A bone density scan was obtained  and revealed osteoporosis. She is currently taking calcium and vit D. Stoppied fosamax Past Medical History:  
Diagnosis Date  
 HTN (hypertension) 2010  Vitiligo 2010 ALEXUS [place] Past Surgical History:  
Procedure Laterality Date  ENDOSCOPY, COLON, DIAGNOSTIC    
 hyperplastic polyps; 10 year repeat; Dr. Bhakta Daughters  FOOT/TOES SURGERY PROC UNLISTED Bone spurs removed R & L foot  HX HEENT  2013  
 cataract removal  
 HX LEEP PROCEDURE   Dr. Pushpa Suthreland  HX TUBAL LIGATION Current Outpatient Medications Medication Sig Dispense Refill  cholecalciferol, vitamin D3, (VITAMIN D3) 2,000 unit tab Take  by mouth.  MULTIVITAMIN PO Take  by mouth.  amLODIPine-benazepril (LOTREL) 10-20 mg per capsule Take 1 Cap by mouth daily. 90 Cap 1  chlorthalidone (HYGROTEN) 25 mg tablet Take 1 Tab by mouth daily. 90 Tab 1  
 alendronate (FOSAMAX) 70 mg tablet Take 1 Tab by mouth every seven (7) days. 12 Tab 1 Allergies: Tramadol Tobacco History:  reports that she has never smoked. She has never used smokeless tobacco. 
Alcohol Abuse:  reports that she drinks alcohol.   1 drink every couple of months Drug Abuse:  reports that she does not use drugs. Patient feels safe at home. Family Medical/Cancer History:  
Family History Problem Relation Age of Onset  Hypertension Sister  Stroke Sister 61  
 Diabetes Sister  Tuberculosis Sister  Stroke Mother 80 TIA  Cancer Father Lung cancer Review of Systems - History obtained from the patient Constitutional: negative for weight loss, fever, night sweats HEENT: negative for hearing loss, earache, congestion, snoring, sorethroat CV: negative for chest pain, palpitations, edema Resp: negative for cough, shortness of breath, wheezing GI: negative for change in bowel habits, abdominal pain, black or bloody stools : negative for frequency, dysuria, hematuria, vaginal discharge MSK: negative for back pain, joint pain, muscle pain Breast: negative for breast lumps, nipple discharge, galactorrhea Skin :negative for itching, rash, hives Neuro: negative for dizziness, headache, confusion, weakness Psych: negative for anxiety, depression, change in mood Heme/lymph: negative for bleeding, bruising, pallor Physical Exam 
 
Visit Vitals /71 (BP 1 Location: Left arm, BP Patient Position: Sitting) Pulse 73 Temp 97.7 °F (36.5 °C) (Oral) Resp 18 Ht 5' (1.524 m) Wt 127 lb (57.6 kg) LMP 06/10/2006 SpO2 98% BMI 24.80 kg/m² Constitutional 
· Appearance: well-nourished, well developed, alert, in no acute distress COLEMAN 
 · Head and Face: appears normal 
 
Neck · Inspection/Palpation: normal appearance, no masses or tenderness · Lymph Nodes: no lymphadenopathy present · Thyroid: gland size normal, nontender, no nodules or masses present on palpation Chest 
· Respiratory Effort: breathing unlabored · Auscultation: normal breath sounds Cardiovascular · Heart: 
· Auscultation: regular rate and rhythm without murmur Breasts · Inspection of Breasts: breasts symmetrical, no skin changes, no discharge present, nipple appearance normal, no skin retraction present · Palpation of Breasts and Axillae: no masses present on palpation, no breast tenderness · Axillary Lymph Nodes: no lymphadenopathy present Gastrointestinal 
· Abdominal Examination: abdomen non-tender to palpation, normal bowel sounds, no masses present · Liver and spleen: no hepatomegaly present, spleen not palpable · Hernias: no hernias identified Genitourinary · External Genitalia: normal appearance for age, no discharge present, no tenderness present, no inflammatory lesions present, no masses present, atrophy present · Vagina: atrophic but otherwise normal vaginal vault without central or paravaginal defects, no discharge present, no inflammatory lesions present, no masses present · Bladder: non-tender to palpation · Urethra: appears normal 
· Cervix: normal  
· Uterus: normal size, shape and consistency · Adnexa: no adnexal tenderness present, no adnexal masses present · Perineum: perineum within normal limits, no evidence of trauma, no rashes or skin lesions present · Anus: anus within normal limits, no hemorrhoids present · Inguinal Lymph Nodes: no lymphadenopathy present Skin · General Inspection: no rash, no lesions identified Neurologic/Psychiatric · Mental Status: · Orientation: grossly oriented to person, place and time · Mood and Affect: mood normal, affect appropriate Assessment: 
Routine gynecologic examination Her current medical status is satisfactory with no evidence of significant gynecologic issues. Plan: 
- pap smear today 
- mammogram up to date 
- osteoporosis treatment per PCP Counseled re: diet, exercise, healthy lifestyle Return for yearly wellness visits Rec annual mammogram

## 2019-04-24 NOTE — PROGRESS NOTES
Chief Complaint Patient presents with  Well Woman Pt presents in office for annual exam with no complaints. Last pap 2017. Last mammogram 3/19. 
 
3 most recent PHQ Screens 4/24/2019 Little interest or pleasure in doing things Several days Feeling down, depressed, irritable, or hopeless Not at all Total Score PHQ 2 1 Fall Risk Assessment, last 12 mths 4/24/2019 Able to walk? Yes Fall in past 12 months? No  
 
 
1. Have you been to the ER, urgent care clinic since your last visit? Hospitalized since your last visit? No 
 
2. Have you seen or consulted any other health care providers outside of the [address] [place] [address] since your last visit? Include any pap smears or colon screening.  No

## 2019-10-31 ENCOUNTER — TELEPHONE (OUTPATIENT)
Dept: INTERNAL MEDICINE CLINIC | Age: 69
End: 2019-10-31

## 2019-10-31 RX ORDER — AMLODIPINE AND BENAZEPRIL HYDROCHLORIDE 10; 20 MG/1; MG/1
1 CAPSULE ORAL DAILY
Qty: 90 CAP | Refills: 0 | Status: SHIPPED | OUTPATIENT
Start: 2019-10-31 | End: 2020-02-14 | Stop reason: SDUPTHER

## 2019-10-31 RX ORDER — CHLORTHALIDONE 25 MG/1
25 TABLET ORAL DAILY
Qty: 90 TAB | Refills: 0 | Status: SHIPPED | OUTPATIENT
Start: 2019-10-31 | End: 2020-02-14 | Stop reason: SDUPTHER

## 2019-10-31 NOTE — TELEPHONE ENCOUNTER
----- Message from Charu Blanchard sent at 10/31/2019 12:57 PM EDT -----  Regarding: Dr. Sebastian Ladd  Medication Refill  yes    Caller (if not patient):      Relationship of caller (if not patient):      Best contact number(s):[phone]      Name of medication and dosage if known: n/a      Is patient out of this medication (yes/no): yes      [facility]    Pharmacy listed in chart? (yes/no):  Pharmacy phone number: yes    Details to clarify the request: Patient is requesting a 90 day supply of Amlodipine 10 mgs and Chlorthalidone 25 mgs sent to [facility] mail order pharmacy   Charu Blanchard

## 2019-10-31 NOTE — TELEPHONE ENCOUNTER
PCP: Annamarie Garcia MD    Last appt: 3/27/2019  Future Appointments   Date Time Provider Ravindra Manuel   11/18/2019 11:15 AM Annamarie Garcia MD [address]   4/21/2020  9:00 AM Hermon Goodell, MD [place] [facility]       Requested Prescriptions     Pending Prescriptions Disp Refills    amLODIPine-benazepril (LOTREL) 10-20 mg per capsule 90 Cap 0     Sig: Take 1 Cap by mouth daily.  chlorthalidone (HYGROTEN) 25 mg tablet 90 Tab 0     Sig: Take 1 Tab by mouth daily.

## 2019-11-18 ENCOUNTER — OFFICE VISIT (OUTPATIENT)
Dept: INTERNAL MEDICINE CLINIC | Age: 69
End: 2019-11-18

## 2019-11-18 VITALS
WEIGHT: 131 LBS | HEIGHT: 60 IN | OXYGEN SATURATION: 98 % | HEART RATE: 69 BPM | TEMPERATURE: 97.8 F | SYSTOLIC BLOOD PRESSURE: 111 MMHG | DIASTOLIC BLOOD PRESSURE: 66 MMHG | BODY MASS INDEX: 25.72 KG/M2 | RESPIRATION RATE: 16 BRPM

## 2019-11-18 DIAGNOSIS — Z23 ENCOUNTER FOR IMMUNIZATION: ICD-10-CM

## 2019-11-18 DIAGNOSIS — M85.80 OSTEOPENIA, UNSPECIFIED LOCATION: ICD-10-CM

## 2019-11-18 DIAGNOSIS — I10 ESSENTIAL HYPERTENSION: Primary | ICD-10-CM

## 2019-11-18 DIAGNOSIS — E78.2 MIXED HYPERLIPIDEMIA: ICD-10-CM

## 2019-11-18 NOTE — PROGRESS NOTES
HISTORY OF PRESENT ILLNESS  Wilma Marti is a 78 y.o. female. HPI   Last here 3/27/19. Pt is here to f/u on chronic conditions.      BP is 125/68  /68 at [facility]   Continues lotrel 10-20mg and chlorthalidone 25mg daily     Wt today is 131-- up 2 lbs x lov   Pt is attending exercises classes twice a week  Her wt is close to nl range      Reviewed labs 3/19   Will get labs today      In the past, pt c/o palpitations, some SOB and cough  States sx resolved  Pt also saw Dr. Stephanie Valentino (cardio) for her sx  Last visit was10/3/17  Her sx have since [place]  States she has had no further issues      Pt follows with Dr. Scott Gillespie (derm) annually   Last visit was    Pt states she only f/u if she has a problem     Pt saw Dr Nasim García (vascular)  Reviewed notes 3/19- checked legs, no evidence of stenosis in bilateral lower extremities, f/u prn       Continues vit D OTC 2000U daily   Fosamax was started in , but pt stopped taking this after trying twice  She stated that she \"feels good without it\"  Will reassess after her DEXA next year       Reviewed mammo 3/19-- negative     ACP not on file. SDMs are [employer]  () or her daughter.    Provided information today.      PREVENTIVE:    Colonoscopy: 18 at [facility], will get report for review, pt will email information on this, per pt 5 year repeat  Pap: Dr Hao Baeza 19   Mammogram: Doc Adalberto 3/27/19, negative   DEXA: 18, osteoporosis  Tdap: 2/10/2016  Pneumovax: 2014  [id number]: 18  Shingrix: declines  Flu shot: 19   A1c:  nl, 3/17 5.4  Eye exam: CLARISSA TORRES ([phone]),    Lipids: 3/19     EK17, new T inversions in the anterior leads  Hep C screen: , negative       Patient Active Problem List    Diagnosis Date Noted    Osteopenia 2017    Essential hypertension 2016    ACP (advance care planning) 02/10/2016    Symptomatic menopausal or female climacteric states 10/25/2013    Bursitis of shoulder, right 01/05/2012    HTN (hypertension) 05/13/2010    Vitiligo 05/13/2010     Current Outpatient Medications   Medication Sig Dispense Refill    amLODIPine-benazepril (LOTREL) 10-20 mg per capsule Take 1 Cap by mouth daily. 90 Cap 0    chlorthalidone (HYGROTEN) 25 mg tablet Take 1 Tab by mouth daily. 90 Tab 0    cholecalciferol, vitamin D3, (VITAMIN D3) 2,000 unit tab Take  by mouth.  MULTIVITAMIN PO Take  by mouth.  alendronate (FOSAMAX) 70 mg tablet Take 1 Tab by mouth every seven (7) days. 12 Tab 1     Past Surgical History:   Procedure Laterality Date    ENDOSCOPY, COLON, DIAGNOSTIC  2003    hyperplastic polyps; 10 year repeat; Dr. Fernie Glynn    FOOT[facility]TOES SURGERY [facility]      Bone spurs removed R & L foot     HX HEENT  03/2013    cataract removal    [facility]    Dr. Jennifer Talbert    HX TUBAL LIGATION        Lab Results   Component Value Date/Time    WBC 3.5 03/27/2019 09:46 AM    HGB 12.9 03/27/2019 09:46 AM    HCT 40.7 03/27/2019 09:46 AM    PLATELET 720 06/15/0686 09:46 AM    MCV 93 03/27/2019 09:46 AM     Lab Results   Component Value Date/Time    Cholesterol, total 176 03/27/2019 09:46 AM    HDL Cholesterol 57 03/27/2019 09:46 AM    LDL, calculated 111 (H) 03/27/2019 09:46 AM    Triglyceride 41 03/27/2019 09:46 AM     Lab Results   Component Value Date/Time    GFR est non-AA 65 03/27/2019 09:46 AM    GFR est AA 74 03/27/2019 09:46 AM    Creatinine 0.91 03/27/2019 09:46 AM    BUN 18 03/27/2019 09:46 AM    Sodium 144 03/27/2019 09:46 AM    Potassium 3.8 03/27/2019 09:46 AM    Chloride 104 03/27/2019 09:46 AM    CO2 25 03/27/2019 09:46 AM        Review of Systems   Respiratory: Negative for shortness of breath. Cardiovascular: Negative for chest pain. Physical Exam   Constitutional: She is oriented to person, place, and time. She appears well-developed and well-nourished. No distress. HENT:   Head: Normocephalic and atraumatic. Mouth/Throat: No oropharyngeal exudate. Eyes: Conjunctivae and EOM are normal. Right eye exhibits no discharge. Left eye exhibits no discharge. Neck: Normal range of motion. Neck supple. Cardiovascular: Normal rate, regular rhythm and normal heart sounds. Exam reveals no gallop and no friction rub. No murmur heard. Pulmonary/Chest: Effort normal and breath sounds normal. No respiratory distress. She has no wheezes. She has no rales. She exhibits no tenderness. Musculoskeletal: Normal range of motion. She exhibits no edema, tenderness or deformity. Lymphadenopathy:     She has no cervical adenopathy. Neurological: She is alert and oriented to person, place, and time. Coordination normal.   Skin: Skin is warm and dry. No rash noted. She is not diaphoretic. No erythema. No pallor. Psychiatric: She has a normal mood and affect. Her behavior is normal.       ASSESSMENT and PLAN    ICD-10-CM ICD-9-CM    1. Essential hypertension          Controlled on lotrel and chlorthalidone no change check labs  E34 897.2 METABOLIC PANEL, COMPREHENSIVE   2. Osteopenia, unspecified location            transiently took fosamax not interested in continuing this takes vit d otc she is open to repeating dexa April 2020 and will address tx further at that time  M85.80 733.90    3. Mixed hyperlipidemia              Diet controlled  E78.2 272.2           Depression screen reviewed and negative. Scribed by Divya Mcgarry of [address] [place] [address] [address], as dictated by Dr. Lexx Cheema. Current diagnosis and concerns discussed with pt at length. Pt understands risks and benefits or current treatment plan and medications, and accepts the treatment and medication with any possible risks. Pt asks appropriate questions, which were answered. Pt was instructed to call with any concerns or problems. I have reviewed the note documented by the scribe. The services provided are my own.   The documentation is accurate

## 2019-11-19 LAB
ALBUMIN SERPL-MCNC: 4.2 G/DL (ref 3.5–4.8)
ALBUMIN/GLOB SERPL: 1.5 {RATIO} (ref 1.2–2.2)
ALP SERPL-CCNC: 76 IU/L (ref 39–117)
ALT SERPL-CCNC: 17 IU/L (ref 0–32)
AST SERPL-CCNC: 18 IU/L (ref 0–40)
BILIRUB SERPL-MCNC: 0.4 MG/DL (ref 0–1.2)
BUN SERPL-MCNC: 23 MG/DL (ref 8–27)
BUN/CREAT SERPL: 22 (ref 12–28)
CALCIUM SERPL-MCNC: 9.8 MG/DL (ref 8.7–10.3)
CHLORIDE SERPL-SCNC: 102 MMOL/L (ref 96–106)
CO2 SERPL-SCNC: 26 MMOL/L (ref 20–29)
CREAT SERPL-MCNC: 1.03 MG/DL (ref 0.57–1)
GLOBULIN SER CALC-MCNC: 2.8 G/DL (ref 1.5–4.5)
GLUCOSE SERPL-MCNC: 80 MG/DL (ref 65–99)
POTASSIUM SERPL-SCNC: 4.3 MMOL/L (ref 3.5–5.2)
PROT SERPL-MCNC: 7 G/DL (ref 6–8.5)
SODIUM SERPL-SCNC: 143 MMOL/L (ref 134–144)

## 2019-11-19 NOTE — PROGRESS NOTES
After obtaining consent, and per verbal order from Dr. Flynn Friday, patient received influenza vaccine given by Tommy Jose in left Deltoid. Influenza Vaccine 0.5 mL IM now. Patient was observed for 10 minutes post injection. Patient tolerated injection well. VIS given.

## 2020-02-14 RX ORDER — AMLODIPINE AND BENAZEPRIL HYDROCHLORIDE 10; 20 MG/1; MG/1
1 CAPSULE ORAL DAILY
Qty: 90 CAP | Refills: 0 | Status: SHIPPED | OUTPATIENT
Start: 2020-02-14 | End: 2020-05-18 | Stop reason: SDUPTHER

## 2020-02-14 RX ORDER — CHLORTHALIDONE 25 MG/1
25 TABLET ORAL DAILY
Qty: 90 TAB | Refills: 0 | Status: SHIPPED | OUTPATIENT
Start: 2020-02-14 | End: 2020-05-18 | Stop reason: SDUPTHER

## 2020-02-14 NOTE — TELEPHONE ENCOUNTER
PCP: Marlon Waldron MD    Last appt: 11/18/2019  Future Appointments   Date Time Provider Ravindra Manuel   4/21/2020  9:00 AM Henok Pisano MD [place] [id number]   5/18/2020 11:15 AM Marlon Waldron MD [facility]       Requested Prescriptions     Pending Prescriptions Disp Refills    amLODIPine-benazepril (LOTREL) 10-20 mg per capsule 90 Cap 0     Sig: Take 1 Cap by mouth daily.  chlorthalidone (HYGROTEN) 25 mg tablet 90 Tab 0     Sig: Take 1 Tab by mouth daily.

## 2020-05-17 NOTE — PROGRESS NOTES
HISTORY OF PRESENT ILLNESS  Jocelyn Gomes is a 78 y.o. female. HPI   Last here 11/18/19. Pt is here to f/u on chronic conditions. This is an established visit completed with telemedicine was completed  the patient acknowledges and agrees to this method of visitation  22 min      bp is controlled per pt  Runs around 120's/70's  Continues lotrel 10-20mg and chlorthalidone 25mg daily     Wt todaywas 131 states about the same currently   Doing a lot of yard work   [employer] work   Her wt is close to nl range      Reviewed labs         saw Dr. Julee Garvin (cardio) for her palpitations in the past  Last visit was10/3/17  Her sx have since resolved         Pt follows with Dr. Maday Stevens (derm) annually   Last visit was 4/17   Pt states she only f/u if she has a problem discussed 5/20     Pt saw Dr Gia Lopez (vascular) for blood flow in legs   No swelling in legs currently   Reviewed notes 3/19- checked legs, no evidence of stenosis in bilateral lower extremities, f/u prn       Continues vit D OTC 2000U daily   Fosamax was started in 4/18, but pt stopped taking this after trying twice  She stated that she \"feels good without it\"  Declines further eval /tx      r pointer finger has been tingling and numb for a couple weeks  Has a little bit of numbness in her toes  No injury  Discussed nerve irritation, discussed medications--not interested in medications       ACP not on file. SDMs are [employer]  () or her daughter.    Provided information today.      PREVENTIVE:    Colonoscopy: 5/11/18 at [facility], will get report for review, pt will email information on this, per pt 5 year repeat  Pap: Dr Elpidio Smith 4/24/19 -scheduled for tomorrow w Lori Garcia  Mammogram: [facility],  5/20 per pt will get report-- negative   DEXA: 4/5/18, osteoporosis fosamax failed--declines further tx or eval   Tdap: 2/10/2016  Pneumovax: 8/13/2014  Esmeofv79: 09/26/18  Shingrix: declines  Flu shot: 11/18/19   A1c: 9/16 nl, 3/17 5.4  Eye exam: CLARISSA TORRES ([phone]),    Lipids: 3/19     EK17, new T inversions in the anterior leads  Hep C screen: , negative       Patient Active Problem List   Diagnosis Code    Vitiligo L80    Bursitis of shoulder, right M75.51    Symptomatic menopausal or female climacteric states N95.1    Essential hypertension I10    Osteopenia M85.80     Current Outpatient Medications   Medication Sig Dispense Refill    amLODIPine-benazepril (LOTREL) 10-20 mg per capsule Take 1 Cap by mouth daily. 90 Cap 0    chlorthalidone (HYGROTEN) 25 mg tablet Take 1 Tab by mouth daily. 90 Tab 0    cholecalciferol, vitamin D3, (VITAMIN D3) 2,000 unit tab Take  by mouth.  MULTIVITAMIN PO Take  by mouth. Lab Results   Component Value Date/Time    Cholesterol, total 176 2019 09:46 AM    HDL Cholesterol 57 2019 09:46 AM    LDL, calculated 111 (H) 2019 09:46 AM    Triglyceride 41 2019 09:46 AM     Lab Results   Component Value Date/Time    GFR est non-AA 55 (L) 2019 12:58 PM    GFR est AA 64 2019 12:58 PM    Creatinine 1.03 (H) 2019 12:58 PM    BUN 23 2019 12:58 PM    Sodium 143 2019 12:58 PM    Potassium 4.3 2019 12:58 PM    Chloride 102 2019 12:58 PM    CO2 26 2019 12:58 PM        Review of Systems   Respiratory: Negative for shortness of breath. Cardiovascular: Negative for chest pain. Physical Exam    ASSESSMENT and PLAN    ICD-10-CM ICD-9-CM    1. Essential hypertension I10 401.9 LIPID PANEL      METABOLIC PANEL, COMPREHENSIVE   Continues lotrel 10-20mg and chlorthalidone 25mg daily   CBC W/O DIFF      TSH 3RD GENERATION   2. Medicare annual wellness visit, subsequent Z00.00 V70.0 LIPID PANEL      METABOLIC PANEL, COMPREHENSIVE      CBC W/O DIFF      TSH 3RD GENERATION   3.  Encounter for screening mammogram for malignant neoplasm of breast Z12.31 V76.12 ALICIA MAMMO BI SCREENING INCL CAD      LIPID PANEL      METABOLIC PANEL, COMPREHENSIVE      CBC W/O DIFF      TSH 3RD GENERATION   4. Postmenopausal state--declines dxa Z78.0 V49.81 DEXA BONE DENSITY STUDY AXIAL      LIPID PANEL      METABOLIC PANEL, COMPREHENSIVE      CBC W/O DIFF      TSH 3RD GENERATION   5. Osteopenia, unspecified location    Vit d for tx  Declines further eval or additional tx  Failed fosamax in the past M85.80 733.90 LIPID PANEL      METABOLIC PANEL, COMPREHENSIVE      CBC W/O DIFF      TSH 3RD GENERATION   6. Overweight    Near goal  Work on not gaining additional weight   E66.3 278.02 LIPID PANEL      METABOLIC PANEL, COMPREHENSIVE      CBC W/O DIFF      TSH 3RD GENERATION   7. Mixed hyperlipidemia    Diet controlled E78.2 272.2 LIPID PANEL      METABOLIC PANEL, COMPREHENSIVE      CBC W/O DIFF   8 neuropathy--mild sx in foot and finger  --declines meds   --will eval further  for progressive sx only   Discussed shoes and avoiding aggravating positions   TSH 3RD GENERATION     This is the Subsequent Medicare Annual Wellness Exam, performed 12 months or more after the Initial AWV or the last Subsequent AWV    Consent: Maximo Estrada, who was seen by synchronous (real-time) audio only technology, and/or her healthcare decision maker, is aware that this patient-initiated, Telehealth encounter on 5/18/2020 is a billable service. While AWVs are fully covered by Medicare, any services rendered on this date that are not included in an AWV are subject to additional billing, with coverage as determined by her insurance carrier. She is aware that she may receive a bill for any such additional services and has provided verbal consent to proceed: Yes. I have reviewed the patient's medical history in detail and updated the computerized patient record.      History     Patient Active Problem List   Diagnosis Code    Vitiligo L80    Bursitis of shoulder, right M75.51    Symptomatic menopausal or female climacteric states N95.1    Essential hypertension I10    Osteopenia M85.80     Past Medical History:   Diagnosis Date    HTN (hypertension) 5/13/2010    Vitiligo 5/13/2010    ALEXUS Westbrook      Past Surgical History:   Procedure Laterality Date    ENDOSCOPY, COLON, DIAGNOSTIC  2003    hyperplastic polyps; 10 year repeat; Dr. Montgomery Face    FOOT/TOES SURGERY PROC UNLISTED      Bone spurs removed R & L foot     HX HEENT  03/2013    cataract removal    Ana Laura Hernandez HX TUBAL LIGATION       Current Outpatient Medications   Medication Sig Dispense Refill    amLODIPine-benazepril (LOTREL) 10-20 mg per capsule Take 1 Cap by mouth daily. 90 Cap 0    chlorthalidone (HYGROTEN) 25 mg tablet Take 1 Tab by mouth daily. 90 Tab 0    cholecalciferol, vitamin D3, (VITAMIN D3) 2,000 unit tab Take  by mouth.  MULTIVITAMIN PO Take  by mouth. Allergies   Allergen Reactions    Tramadol Nausea Only       Family History   Problem Relation Age of Onset    Hypertension Sister     Stroke Sister 61    Diabetes Sister     Tuberculosis Sister     Stroke Mother 80        TIA    Cancer Father         Lung cancer     Social History     Tobacco Use    Smoking status: Never Smoker    Smokeless tobacco: Never Used   Substance Use Topics    Alcohol use: Yes     Comment: occasional       Depression Risk Factor Screening:     3 most recent PHQ Screens 5/18/2020   Little interest or pleasure in doing things Not at all   Feeling down, depressed, irritable, or hopeless Not at all   Total Score PHQ 2 0       Alcohol Risk Factor Screening:   Do you average 1 drink per night or more than 7 drinks a week:  No    On any one occasion in the past three months have you have had more than 3 drinks containing alcohol:  No  Rarely drinks    Functional Ability and Level of Safety:   Hearing: Hearing is good. Activities of Daily Living: The home contains: no safety equipment.   Patient does total self care    Ambulation: with no difficulty    Fall Risk:  Fall Risk Assessment, last 12 mths 5/18/2020   Able to walk? Yes   Fall in past 12 months? No       Abuse Screen:  Patient is not abused  Lives w    Cognitive Screening   Has your family/caregiver stated any concerns about your memory: no  Cognitive Screening: Normal - Mini Cog Test    Patient Care Team   Patient Care Team:  Janeth Gonzalez MD as PCP - General (Internal Medicine)  Janeth Gonzalez MD as PCP - [facility]  Pablo Dean MD (Dermatology)  Courtney Plascencia MD (Gastroenterology)  Alfonso Capellan MD (Gynecology)  Jany Lane MD (Cardiology)  Shelton Alexander MD (Vascular Surgery)     updated    Assessment/Plan   Education and counseling provided:  Are appropriate based on today's review and evaluation  End-of-Life planning (with patient's consent)  Screening Mammography  Screening Pap and pelvic (covered once every 2 years)  Colorectal cancer screening tests  Cardiovascular screening blood test  Bone mass measurement (DEXA)  Screening for glaucoma  Diabetes screening test    Diagnoses and all orders for this visit:    1. Medicare annual wellness visit, subsequent    2. Encounter for screening mammogram for malignant neoplasm of breast  -     ALICIA MAMMO BI SCREENING INCL CAD; Future    3. Postmenopausal state  -     DEXA BONE DENSITY STUDY AXIAL; Future        Health Maintenance Due   Topic Date Due    Shingrix Vaccine Age 49> (1 of 2) 08/20/1999    Pneumococcal 65+ years (2 of 2 - PPSV23) 09/26/2019    Medicare Yearly Exam  03/27/2020    GLAUCOMA SCREENING Q2Y  04/05/2020       Radha Ambrose is a 78 y.o. female who was evaluated by an audio only encounter for concerns as above. Patient identification was verified prior to start of the visit. A caregiver was present when appropriate.  Due to this being a TeleHealth encounter (During GFBQX-33 public health emergency), evaluation of the following organ systems was limited: Vitals/Constitutional/EENT/Resp/CV/GI//MS/Neuro/Skin/Heme-Lymph-Imm. Pursuant to the emergency declaration under the [facility], [facility] waiver authority and the [employer] Act, this Virtual Visit was conducted, with patient's (and/or legal guardian's) consent, to reduce the patient's risk of exposure to COVID-19 and provide necessary medical care. Services were provided through a synchronous discussion virtually to substitute for in-person clinic visit. I was at home. The patient was at home. ACP not on file. SDMs are [employer]  () or her daughter. Provided information today. --sent to house        Colonoscopy: 18 at [facility], will get report for review, pt will email information on this, per pt 5 year repeat  Pap: Dr Yany Tya 19 -scheduled for tomorrow w Heather Cueto   Mammogram: [facility],   per pt will get report-- negative   DEXA: 18, osteoporosis fosamax failed--declines further tx or eval       Tdap: 2/10/2016  Pneumovax: 2014  [id number]: 18  Shingrix: declines  Flu shot: 19     Lipids: 3/19     due  Fasting glucose 3/19 due      Eye exam: CLARISSA TORRES ([phone]),   annual    EK17, new T inversions in the anterior leads  Hep C screen: , negative    Medication reconciliation completed by MA and reviewed by me. Medical/surgical/social/family history reviewed and updated by me. Patient provided AVS and preventative screening table. Patient verbalized understanding of all information discussed.           Masoud Martinez MD

## 2020-05-18 ENCOUNTER — VIRTUAL VISIT (OUTPATIENT)
Dept: INTERNAL MEDICINE CLINIC | Age: 70
End: 2020-05-18

## 2020-05-18 DIAGNOSIS — I10 ESSENTIAL HYPERTENSION: Primary | ICD-10-CM

## 2020-05-18 DIAGNOSIS — Z78.0 POSTMENOPAUSAL STATE: ICD-10-CM

## 2020-05-18 DIAGNOSIS — M85.80 OSTEOPENIA, UNSPECIFIED LOCATION: ICD-10-CM

## 2020-05-18 DIAGNOSIS — Z00.00 MEDICARE ANNUAL WELLNESS VISIT, SUBSEQUENT: ICD-10-CM

## 2020-05-18 DIAGNOSIS — E78.2 MIXED HYPERLIPIDEMIA: ICD-10-CM

## 2020-05-18 DIAGNOSIS — Z12.31 ENCOUNTER FOR SCREENING MAMMOGRAM FOR MALIGNANT NEOPLASM OF BREAST: ICD-10-CM

## 2020-05-18 DIAGNOSIS — E66.3 OVERWEIGHT: ICD-10-CM

## 2020-05-18 RX ORDER — AMLODIPINE AND BENAZEPRIL HYDROCHLORIDE 10; 20 MG/1; MG/1
1 CAPSULE ORAL DAILY
Qty: 90 CAP | Refills: 0 | Status: SHIPPED | OUTPATIENT
Start: 2020-05-18 | End: 2020-08-17 | Stop reason: SDUPTHER

## 2020-05-18 RX ORDER — CHLORTHALIDONE 25 MG/1
25 TABLET ORAL DAILY
Qty: 90 TAB | Refills: 0 | Status: SHIPPED | OUTPATIENT
Start: 2020-05-18 | End: 2020-08-17 | Stop reason: SDUPTHER

## 2020-05-18 NOTE — PATIENT INSTRUCTIONS
Medicare Wellness Visit, Female The best way to live healthy is to have a lifestyle where you eat a well-balanced diet, exercise regularly, limit alcohol use, and quit all forms of tobacco/nicotine, if applicable. Regular preventive services are another way to keep healthy. Preventive services (vaccines, screening tests, monitoring & exams) can help personalize your care plan, which helps you manage your own care. Screening tests can find health problems at the earliest stages, when they are easiest to treat. [facility] follows the current, evidence-based guidelines published by the [facility]it White ([facility]) when recommending preventive services for our patients. Because we follow these guidelines, sometimes recommendations change over time as research supports it. (For example, mammograms used to be recommended annually. Even though Medicare will still pay for an annual mammogram, the newer guidelines recommend a mammogram every two years for women of average risk). Of course, you and your doctor may decide to screen more often for some diseases, based on your risk and your co-morbidities (chronic disease you are already diagnosed with). Preventive services for you include: - Medicare offers their members a free annual wellness visit, which is time for you and your primary care provider to discuss and plan for your preventive service needs. Take advantage of this benefit every year! 
-All adults over the age of 72 should receive the recommended pneumonia vaccines. Current USPSTF guidelines recommend a series of two vaccines for the best pneumonia protection.  
-All adults should have a flu vaccine yearly and a tetanus vaccine every 10 years.  
-All adults age 48 and older should receive the shingles vaccines (series of two vaccines). -All adults age 38-68 who are overweight should have a diabetes screening test once every three years. -All adults born between [birth year] and 1965 should be screened once for Hepatitis C. 
-Other screening tests and preventive services for persons with diabetes include: an eye exam to screen for diabetic retinopathy, a kidney function test, a foot exam, and stricter control over your cholesterol.  
-Cardiovascular screening for adults with routine risk involves an electrocardiogram (ECG) at intervals determined by your doctor.  
-Colorectal cancer screenings should be done for adults age 54-65 with no increased risk factors for colorectal cancer. There are a number of acceptable methods of screening for this type of cancer. Each test has its own benefits and drawbacks. Discuss with your doctor what is most appropriate for you during your annual wellness visit. The different tests include: colonoscopy (considered the best screening method), a fecal occult blood test, a fecal DNA test, and sigmoidoscopy. 
 
-A bone mass density test is recommended when a woman turns 65 to screen for osteoporosis. This test is only recommended one time, as a screening. Some providers will use this same test as a disease monitoring tool if you already have osteoporosis. -Breast cancer screenings are recommended every other year for women of normal risk, age 54-69. 
-Cervical cancer screenings for women over age 72 are only recommended with certain risk factors. Here is a list of your current Health Maintenance items (your personalized list of preventive services) with a due date: 
Health Maintenance Due Topic Date Due  Shingles Vaccine (1 of 2) 08/20/1999  Pneumococcal Vaccine (2 of 2 - PPSV23) 09/26/2019 [address] [place] [address] Annual Well Visit  03/27/2020  Glaucoma Screening   04/05/2020

## 2020-05-18 NOTE — Clinical Note
Get mammo from [facility] Send labs to pt home and fax to [facility] at [facility] Send directive to pt

## 2020-05-19 ENCOUNTER — OFFICE VISIT (OUTPATIENT)
Dept: OBGYN CLINIC | Age: 70
End: 2020-05-19

## 2020-05-19 VITALS
DIASTOLIC BLOOD PRESSURE: 84 MMHG | BODY MASS INDEX: 25.91 KG/M2 | WEIGHT: 132 LBS | SYSTOLIC BLOOD PRESSURE: 132 MMHG | HEIGHT: 60 IN

## 2020-05-19 DIAGNOSIS — Z01.419 ENCOUNTER FOR GYNECOLOGICAL EXAMINATION WITHOUT ABNORMAL FINDING: Primary | ICD-10-CM

## 2020-05-19 NOTE — PROGRESS NOTES
Annual exam    Andino Sample is a 78 y.o.  A0 presenting for annual exam. Doing well. No problems or concerns today. Denies issues with vaginal dryness, discomfort, urinary leakage, prolapse or postmenopausal bleeding. She is not sexually active,  had a stroke and is a heavy drinker, she feels safe, he is not abusive. Ob/Gyn Hx:   A0 -3   Menopause- age 51's  ? PMB-denies  ? VMS-denies  ? Vag dryness-denies  ? HRT-denies  STI- chlamydia  ? SA-not currently (10 years)    Health maintenance:  Pap-19 NILM, normal paps since prior LEEP procedure in , no further paps indicated  Mammo-2020 at [facility] (pt reports this was normal), prior mammo 3/29/19 B1  Colonoscopy- 3 years ago  Dexa-18 osteoporosis --> on Ca and vitD, previously on fosamax, but didn't like how it made her feel, discussed other options and advised she follow up with PCP, encouarged wt bearing exercise    Past Medical History:   Diagnosis Date    HTN (hypertension) 2010    Vitiligo 2010    ALEXUS Cobb       Past Surgical History:   Procedure Laterality Date    ENDOSCOPY, COLON, DIAGNOSTIC      hyperplastic polyps; 10 year repeat; Dr. Julia Carrion    FOOT/TOES SURGERY [facility]      Bone spurs removed R & L foot     HX HEENT  2013    cataract removal    Dayday Cali Ek HX TUBAL LIGATION         Family History   Problem Relation Age of Onset    Hypertension Sister     Stroke Sister 61    Diabetes Sister     Tuberculosis Sister     Stroke Mother 80        TIA    Cancer Father         Lung cancer       Social History     Socioeconomic History    Marital status:      Spouse name: Not on file    Number of children: Not on file    Years of education: Not on file    Highest education level: Not on file   Occupational History    Not on file   Social Needs    Financial resource strain: Not on file    Food insecurity     Worry: Not on file     Inability: Not on file   [employer] needs     Medical: Not on file     Non-medical: Not on file   Tobacco Use    Smoking status: Never Smoker    Smokeless tobacco: Never Used   Substance and Sexual Activity    Alcohol use: Yes     Comment: rarely    Drug use: No    Sexual activity: Not Currently     Partners: Male     Birth control/protection: Surgical   Lifestyle    Physical activity     Days per week: Not on file     Minutes per session: Not on file    Stress: Not on file   Relationships    Social connections     Talks on phone: Not on file     Gets together: Not on file     Attends [employer] service: Not on file     Active member of club or organization: Not on file     Attends meetings of clubs or organizations: Not on file     Relationship status: Not on file    Intimate partner violence     Fear of current or ex partner: Not on file     Emotionally abused: Not on file     Physically abused: Not on file     Forced sexual activity: Not on file   Other Topics Concern    Not on file   Social History Narrative    Not on file       Current Outpatient Medications   Medication Sig Dispense Refill    amLODIPine-benazepril (LotreL) 10-20 mg per capsule Take 1 Cap by mouth daily. 90 Cap 0    chlorthalidone (HYGROTEN) 25 mg tablet Take 1 Tab by mouth daily. 90 Tab 0    cholecalciferol, vitamin D3, (VITAMIN D3) 2,000 unit tab Take  by mouth.  MULTIVITAMIN PO Take  by mouth.          Allergies   Allergen Reactions    Tramadol Nausea Only       Review of Systems - History obtained from the patient  Constitutional: negative for weight loss, fever, night sweats  HEENT: negative for hearing loss, earache, congestion, snoring, sorethroat  CV: negative for chest pain, palpitations, edema  Resp: negative for cough, shortness of breath, wheezing  GI: negative for change in bowel habits, abdominal pain, black or bloody stools  : negative for frequency, dysuria, hematuria, vaginal discharge  MSK: negative for back pain, joint pain, muscle pain  Breast: negative for breast lumps, nipple discharge, galactorrhea  Skin :negative for itching, rash, hives  Neuro: negative for dizziness, headache, confusion, weakness  Psych: negative for anxiety, depression, change in mood  Heme/lymph: negative for bleeding, bruising, pallor    Physical Exam    Visit Vitals  /84 (BP 1 Location: Left arm, BP Patient Position: Sitting)   Ht 5' (1.524 m)   Wt 132 lb (59.9 kg)   LMP 06/10/2006   BMI 25.78 kg/m²       Constitutional  · Appearance: well-nourished, well developed, alert, in no acute distress    HENT  · Head and Face: appears normal    Neck  · Inspection/Palpation: normal appearance, no masses or tenderness  · Lymph Nodes: no lymphadenopathy present  · Thyroid: gland size normal, nontender, no nodules or masses present on palpation    Chest  · Respiratory Effort: non-labored breathing  · Auscultation: CTAB, normal breath sounds    Cardiovascular  · Heart:  · Auscultation: regular rate and rhythm without murmur  · Extremities: no peripheral edema    Breasts  · Inspection of Breasts: breasts symmetrical, no skin changes, no discharge present, nipple appearance normal, no skin retraction present  · Palpation of Breasts and Axillae: no masses present on palpation, no breast tenderness  · Axillary Lymph Nodes: no lymphadenopathy present    Gastrointestinal  · Abdominal Examination: abdomen non-tender to palpation, normal bowel sounds, no masses present  · Liver and spleen: no hepatomegaly present, spleen not palpable  · Hernias: no hernias identified    Genitourinary  · External Genitalia: normal appearance for age, no discharge present, no tenderness present, no inflammatory lesions present, no masses present, +atrophy of UG mucosa  · Vagina: normal vaginal vault without central or paravaginal defects, no discharge present, no inflammatory lesions present, no masses present  · Bladder: non-tender to palpation  · Urethra: appears normal  · Cervix: normal   · Uterus: normal size, shape and consistency, small, mobile  · Adnexa: no adnexal tenderness present, no adnexal masses present  · Perineum: perineum within normal limits, no evidence of trauma, no rashes or skin lesions present    Skin  · General Inspection: no rash, no lesions identified    Neurologic/Psychiatric  · Mental Status:  · Orientation: grossly oriented to person, place and time  · Mood and Affect: mood normal, affect appropriate      Assessment/Plan:  78 y.o. postmenopausal female presenting for annual exam. Overall doing well.      Health Maintenance:  -counseled re: diet, exercise, healthy lifestyle  -pap/HPV no longer indicated  -declines STI testing  -mammo utd, repeat 1 year  -colonoscopy utd  -osteoporosis, continue Ca and vitD, advised she follow up with PCP to discuss options for management, encouarged wt bearing exercise    RTC: 2 yrs for well-woman GYN exam, or sooner damir De La Vega MD  5/19/2020  11:08 AM

## 2020-05-19 NOTE — PATIENT INSTRUCTIONS
Pelvic Exam: Care Instructions  Your Care Instructions    When your doctor examines all of your pelvic organs, it's called a pelvic exam. Two good reasons to have this kind of exam are to check for sexually transmitted infections (STIs) and to get a Pap test. A Pap test is also called a Pap smear. It checks for early changes that can lead to cancer of the cervix. Sometimes a pelvic exam is part of a regular checkup. Your doctor may ask you to avoid vaginal sex, tampons, vaginal medicines, vaginal sprays or powders, and douching for 1 to 2 days before the test.  Other times, women have this kind of exam at any time of the month. This is because they have pelvic pain, bleeding, or discharge. Or they may have another pelvic problem. Before your exam, it's important to share some information with your doctor. For example, if you are a survivor of rape or sexual abuse, you can talk about any concerns you may have. Your doctor will also want to know if you are pregnant or use birth control. And he or she will want to hear about any problems, surgeries, or procedures you have had in your pelvic area. You will also need to tell your doctor when your last period was. Follow-up care is a key part of your treatment and safety. Be sure to make and go to all appointments, and call your doctor if you are having problems. It's also a good idea to know your test results and keep a list of the medicines you take. How is a pelvic exam done? · During a pelvic exam, you will:  ? Take off your clothes below the waist. You will get a paper or cloth cover to put over the lower half of your body. If this is regular checkup, you may undress completely and put on a gown. ? Lie on your back on an exam table. Your feet will be raised above you. Stirrups will support your feet. · The doctor will:  ? Ask you to relax your knees. Your knees need to lean out, toward the walls. ?  Check the opening of your vagina for sores or swelling. ? Gently put a tool called a speculum into your vagina. It opens the vagina a little bit. You will feel some pressure. But if you are relaxed, it will not hurt. It lets your doctor see inside the vagina. ? Use a small brush, spatula, or swab to get a sample of cells, if you are having a Pap test or culture. The doctor then removes the speculum. ? Put on gloves and put one or two fingers of one hand into your vagina. The other hand goes on your lower belly. This lets your doctor feel your pelvic organs. You will probably feel some pressure. Try to stay relaxed. ? Put one gloved finger into your rectum and one into your vagina, if needed. This can also help check your pelvic organs. This exam takes about 10 minutes. At the end, you will get a washcloth or tissue to clean your vaginal area. You can then get dressed. Why is a pelvic exam done? A pelvic exam may be done:  · As part of a woman's regular physical checkup. The exam may include a Pap test.  · To check for vaginal infection. · To check for sexually transmitted infections, such as chlamydia or herpes. · To help find the cause of abnormal uterine bleeding. · To look for problems like uterine fibroids, ovarian cysts, or uterine prolapse. · To find the cause of pelvic or belly pain. · Before inserting an intrauterine device (IUD) for birth control. · To collect evidence if you've been sexually assaulted. What are the risks of a pelvic exam?  There is a small chance that the doctor will find something on a pelvic exam that would not have caused a problem. This is called overdiagnosis. It could lead to tests or treatment you don't need. When should you call for help? Watch closely for changes in your health, and be sure to contact your doctor if you have any problems. Where can you learn more? Go to http://aracely-conrado.info/  Enter M421 in the search box to learn more about \"Pelvic Exam: Care Instructions. \"  Current as of: November 7, 2019Content Version: 12.4  © 2660-5516 [employer]. Care instructions adapted under license by [employer] (which disclaims liability or warranty for this information). If you have questions about a medical condition or this instruction, always ask your healthcare professional. [address] any warranty or liability for your use of this information.

## 2020-05-20 LAB
ALBUMIN SERPL-MCNC: 4.3 G/DL (ref 3.8–4.8)
ALBUMIN/GLOB SERPL: 1.6 {RATIO} (ref 1.2–2.2)
ALP SERPL-CCNC: 82 IU/L (ref 39–117)
ALT SERPL-CCNC: 17 IU/L (ref 0–32)
AST SERPL-CCNC: 20 IU/L (ref 0–40)
BILIRUB SERPL-MCNC: 0.4 MG/DL (ref 0–1.2)
BUN SERPL-MCNC: 22 MG/DL (ref 8–27)
BUN/CREAT SERPL: 27 (ref 12–28)
CALCIUM SERPL-MCNC: 9.6 MG/DL (ref 8.7–10.3)
CHLORIDE SERPL-SCNC: 104 MMOL/L (ref 96–106)
CHOLEST SERPL-MCNC: 170 MG/DL (ref 100–199)
CO2 SERPL-SCNC: 25 MMOL/L (ref 20–29)
CREAT SERPL-MCNC: 0.83 MG/DL (ref 0.57–1)
ERYTHROCYTE [DISTWIDTH] IN BLOOD BY AUTOMATED COUNT: 11.9 % (ref 11.7–15.4)
GLOBULIN SER CALC-MCNC: 2.7 G/DL (ref 1.5–4.5)
GLUCOSE SERPL-MCNC: 92 MG/DL (ref 65–99)
HCT VFR BLD AUTO: 40.7 % (ref 34–46.6)
HDLC SERPL-MCNC: 55 MG/DL
HGB BLD-MCNC: 13.2 G/DL (ref 11.1–15.9)
LDLC SERPL CALC-MCNC: 99 MG/DL (ref 0–99)
MCH RBC QN AUTO: 30.6 PG (ref 26.6–33)
MCHC RBC AUTO-ENTMCNC: 32.4 G/DL (ref 31.5–35.7)
MCV RBC AUTO: 94 FL (ref 79–97)
PLATELET # BLD AUTO: 250 X10E3/UL (ref 150–450)
POTASSIUM SERPL-SCNC: 3.9 MMOL/L (ref 3.5–5.2)
PROT SERPL-MCNC: 7 G/DL (ref 6–8.5)
RBC # BLD AUTO: 4.31 X10E6/UL (ref 3.77–5.28)
SODIUM SERPL-SCNC: 143 MMOL/L (ref 134–144)
TRIGL SERPL-MCNC: 81 MG/DL (ref 0–149)
TSH SERPL DL<=0.005 MIU/L-ACNC: 1.58 UIU/ML (ref 0.45–4.5)
VLDLC SERPL CALC-MCNC: 16 MG/DL (ref 5–40)
WBC # BLD AUTO: 4.1 X10E3/UL (ref 3.4–10.8)

## 2020-08-20 NOTE — TELEPHONE ENCOUNTER
Future Appointments:  No future appointments. Last Appointment With Me:  5/18/2020     Last Appointment My Department:  Visit date not found    Requested Prescriptions     Pending Prescriptions Disp Refills    chlorthalidone (HYGROTEN) 25 mg tablet 90 Tab 0     Sig: Take 1 Tab by mouth daily.  amLODIPine-benazepril (LotreL) 10-20 mg per capsule 90 Cap 0     Sig: Take 1 Cap by mouth daily.

## 2020-08-22 RX ORDER — CHLORTHALIDONE 25 MG/1
25 TABLET ORAL DAILY
Qty: 90 TAB | Refills: 0 | Status: SHIPPED | OUTPATIENT
Start: 2020-08-22 | End: 2020-12-04 | Stop reason: SDUPTHER

## 2020-08-22 RX ORDER — AMLODIPINE AND BENAZEPRIL HYDROCHLORIDE 10; 20 MG/1; MG/1
1 CAPSULE ORAL DAILY
Qty: 90 CAP | Refills: 0 | Status: SHIPPED | OUTPATIENT
Start: 2020-08-22 | End: 2020-12-04 | Stop reason: SDUPTHER

## 2020-09-29 NOTE — PROGRESS NOTES
HISTORY OF PRESENT ILLNESS  Octavio Phillips is a 69 y.o. female. HPI     Last here 20. Pt is here to f/u on chronic conditions. She c/o numbness in fingers and toes   She c/o this lov   This is worse at night and after waking up in the morning  The numbness/tingling is worse in her fingers and right index finger  Discussed this is carpal tunnel  Discussed wearing splints at night   Discussed if not getting better should see neuro for nerve conduction studies - will give referral    bp today is 120/67  BP at home 119/64   Continues lotrel 10-20mg and chlorthalidone 25mg daily     Wt is 134 lbs - up 3 lbs x lov   Her wt is close to nl range      Reviewed labs    Ordered labs      saw Dr. Lynsey Whitaker (cardio) for her palpitations in the past  Last visit was10/3/17  Her sx have since resolved          Pt follows with Dr. Jimena Gann (derm) annually   Last visit was    Pt states she only f/u if she has a problem      Pt saw Dr Bill Mckeon (vascular) for blood flow in legs   No swelling in legs currently   Last visit 3/19       Continues vit D OTC 2000U daily   Fosamax was started in , but pt stopped taking this after trying twice  She stated that she \"feels good without it\"  Declines further eval /tx      ACP not on file. SDMs are [employer]  () or her daughter.    Provided information today.      PREVENTIVE:    Colonoscopy: 18 at [facility]  per pt 5 year repeat  Pap: 20 sandra no   Mammogram: [facility],   per pt will get report-- negative   DEXA: 18, osteoporosis fosamax failed--declines further tx or eval   Tdap: 2/10/2016  Pneumovax: 2014  [id number]: 18  Shingrix: declines  Flu shot: 19, will get today 20  A1c:  nl, 3/17 5.4  Eye exam: CLARISSA TORRES ([phone]), , due reminded    Lipids:   LDL 99  EK17, new T inversions in the anterior leads  Hep C screen: , negative       Patient Active Problem List    Diagnosis Date Noted    Osteopenia 03/22/2017    Essential hypertension 04/06/2016    Symptomatic menopausal or female climacteric states 10/25/2013    Bursitis of shoulder, right 01/05/2012    Vitiligo 05/13/2010     Current Outpatient Medications   Medication Sig Dispense Refill    chlorthalidone (HYGROTEN) 25 mg tablet Take 1 Tab by mouth daily. 90 Tab 0    amLODIPine-benazepril (LotreL) 10-20 mg per capsule Take 1 Cap by mouth daily. 90 Cap 0    cholecalciferol, vitamin D3, (VITAMIN D3) 2,000 unit tab Take  by mouth.  MULTIVITAMIN PO Take  by mouth. Past Surgical History:   Procedure Laterality Date    ENDOSCOPY, COLON, DIAGNOSTIC  2003    hyperplastic polyps; 10 year repeat; Dr. Marce Dakins    FOOT/TOES SURGERY [facility]      Bone spurs removed R & L foot     HX HEENT  03/2013    cataract removal    [address] [place]    HX TUBAL LIGATION        Lab Results   Component Value Date/Time    WBC 4.1 05/19/2020 11:37 AM    HGB 13.2 05/19/2020 11:37 AM    HCT 40.7 05/19/2020 11:37 AM    PLATELET 971 93/46/6232 11:37 AM    MCV 94 05/19/2020 11:37 AM     Lab Results   Component Value Date/Time    Cholesterol, total 170 05/19/2020 11:37 AM    HDL Cholesterol 55 05/19/2020 11:37 AM    LDL, calculated 99 05/19/2020 11:37 AM    Triglyceride 81 05/19/2020 11:37 AM     Lab Results   Component Value Date/Time    GFR est non-AA 72 05/19/2020 11:37 AM    GFR est AA 83 05/19/2020 11:37 AM    Creatinine 0.83 05/19/2020 11:37 AM    BUN 22 05/19/2020 11:37 AM    Sodium 143 05/19/2020 11:37 AM    Potassium 3.9 05/19/2020 11:37 AM    Chloride 104 05/19/2020 11:37 AM    CO2 25 05/19/2020 11:37 AM        Review of Systems   Respiratory: Negative for shortness of breath. Cardiovascular: Negative for chest pain. Physical Exam  Constitutional:       General: She is not in acute distress. Appearance: Normal appearance. She is not ill-appearing, toxic-appearing or diaphoretic.    HENT:      Head: Normocephalic and atraumatic. Right Ear: External ear normal.      Left Ear: External ear normal.   Eyes:      General:         Right eye: No discharge. Left eye: No discharge. Conjunctiva/sclera: Conjunctivae normal.      Pupils: Pupils are equal, round, and reactive to light. Neck:      Musculoskeletal: Normal range of motion and neck supple. Cardiovascular:      Rate and Rhythm: Normal rate and regular rhythm. Pulses: Normal pulses. Heart sounds: Normal heart sounds. No murmur. No friction rub. No gallop. Pulmonary:      Effort: No respiratory distress. Breath sounds: Normal breath sounds. No wheezing or rales. Chest:      Chest wall: No tenderness. Musculoskeletal: Normal range of motion. Right lower leg: No edema. Left lower leg: No edema. Skin:     General: Skin is warm and dry. Neurological:      Mental Status: She is alert and oriented to person, place, and time. Mental status is at baseline. Coordination: Coordination normal.      Gait: Gait normal.   Psychiatric:         Mood and Affect: Mood normal.         Behavior: Behavior normal.         ASSESSMENT and PLAN    ICD-10-CM ICD-9-CM    1. Essential hypertension               Well controlled on lotrel and chlorthalidone continue   I10 401.9 CBC W/O DIFF      HEMOGLOBIN A1C WITH EAG      TSH 3RD GENERATION      VITAMIN D, 25 HYDROXY      VITAMIN K96      METABOLIC PANEL, COMPREHENSIVE   2. Osteopenia, unspecified location         Could not eliecer fosomax in past not interested in treatment or further eval, vit d otc M85.80 733.90 CBC W/O DIFF      HEMOGLOBIN A1C WITH EAG      TSH 3RD GENERATION      VITAMIN D, 25 HYDROXY      VITAMIN G93      METABOLIC PANEL, COMPREHENSIVE   3.  Bilateral carpal tunnel syndrome  G56.03 354.0 AMB SUPPLY ORDER      CBC W/O DIFF      HEMOGLOBIN A1C WITH EAG   Discussed will give wrist splints I suspect her nerve symptoms are from carpal tunnel they are progressively worse after she sleeps at night and improves during the day    If her symptoms are not improving would get neurology eval with EMG next to see where her symptoms are coming from   TSH 3RD GENERATION      VITAMIN D, 25 HYDROXY      VITAMIN J50      METABOLIC PANEL, COMPREHENSIVE   4. Neuropathy  G62.9 355.9 REFERRAL TO NEUROLOGY      CBC W/O DIFF      HEMOGLOBIN A1C WITH EAG   See above we will also check B12 and vitamin D levels checking basic labs   TSH 3RD GENERATION      VITAMIN D, 25 HYDROXY      VITAMIN H18      METABOLIC PANEL, COMPREHENSIVE   5. B12 deficiency  E53.8 266.2 CBC W/O DIFF      HEMOGLOBIN A1C WITH EAG      TSH 3RD GENERATION   Check level treat as needed   VITAMIN D, 25 HYDROXY      VITAMIN L98      METABOLIC PANEL, COMPREHENSIVE   6. Vitamin D deficiency  E55.9 268.9 CBC W/O DIFF      HEMOGLOBIN A1C WITH EAG      TSH 3RD GENERATION   Check level treat as needed   VITAMIN D, 25 HYDROXY      VITAMIN S16      METABOLIC PANEL, COMPREHENSIVE      Depression screen reviewed and negative      Scribed by Jarrod Wang of [address] [place] [address] [address], as dictated by Dr. Ann Meyer. Current diagnosis and concerns discussed with pt at length. Pt understands risks and benefits or current treatment plan and medications, and accepts the treatment and medication with any possible risks. Pt asks appropriate questions, which were answered. Pt was instructed to call with any concerns or problems. I have reviewed the note documented by the scribe. The services provided are my own.   The documentation is accurate

## 2020-09-30 ENCOUNTER — OFFICE VISIT (OUTPATIENT)
Dept: INTERNAL MEDICINE CLINIC | Age: 70
End: 2020-09-30
Payer: MEDICARE

## 2020-09-30 VITALS
DIASTOLIC BLOOD PRESSURE: 67 MMHG | OXYGEN SATURATION: 98 % | HEART RATE: 73 BPM | BODY MASS INDEX: 26.35 KG/M2 | TEMPERATURE: 97.8 F | HEIGHT: 60 IN | RESPIRATION RATE: 16 BRPM | SYSTOLIC BLOOD PRESSURE: 120 MMHG | WEIGHT: 134.2 LBS

## 2020-09-30 DIAGNOSIS — Z23 NEEDS FLU SHOT: ICD-10-CM

## 2020-09-30 DIAGNOSIS — E53.8 B12 DEFICIENCY: ICD-10-CM

## 2020-09-30 DIAGNOSIS — G62.9 NEUROPATHY: ICD-10-CM

## 2020-09-30 DIAGNOSIS — E55.9 VITAMIN D DEFICIENCY: ICD-10-CM

## 2020-09-30 DIAGNOSIS — M85.80 OSTEOPENIA, UNSPECIFIED LOCATION: ICD-10-CM

## 2020-09-30 DIAGNOSIS — I10 ESSENTIAL HYPERTENSION: Primary | ICD-10-CM

## 2020-09-30 DIAGNOSIS — G56.03 BILATERAL CARPAL TUNNEL SYNDROME: ICD-10-CM

## 2020-09-30 PROCEDURE — G8399 PT W/DXA RESULTS DOCUMENT: HCPCS | Performed by: INTERNAL MEDICINE

## 2020-09-30 PROCEDURE — G0008 ADMIN INFLUENZA VIRUS VAC: HCPCS

## 2020-09-30 PROCEDURE — G8536 NO DOC ELDER MAL SCRN: HCPCS | Performed by: INTERNAL MEDICINE

## 2020-09-30 PROCEDURE — G8510 SCR DEP NEG, NO PLAN REQD: HCPCS | Performed by: INTERNAL MEDICINE

## 2020-09-30 PROCEDURE — G9899 SCRN MAM PERF RSLTS DOC: HCPCS | Performed by: INTERNAL MEDICINE

## 2020-09-30 PROCEDURE — 90694 VACC AIIV4 NO PRSRV 0.5ML IM: CPT

## 2020-09-30 PROCEDURE — G8419 CALC BMI OUT NRM PARAM NOF/U: HCPCS | Performed by: INTERNAL MEDICINE

## 2020-09-30 PROCEDURE — G8752 SYS BP LESS 140: HCPCS | Performed by: INTERNAL MEDICINE

## 2020-09-30 PROCEDURE — 99214 OFFICE O/P EST MOD 30 MIN: CPT | Performed by: INTERNAL MEDICINE

## 2020-09-30 PROCEDURE — 3017F COLORECTAL CA SCREEN DOC REV: CPT | Performed by: INTERNAL MEDICINE

## 2020-09-30 PROCEDURE — G8754 DIAS BP LESS 90: HCPCS | Performed by: INTERNAL MEDICINE

## 2020-09-30 PROCEDURE — 1101F PT FALLS ASSESS-DOCD LE1/YR: CPT | Performed by: INTERNAL MEDICINE

## 2020-09-30 PROCEDURE — G8427 DOCREV CUR MEDS BY ELIG CLIN: HCPCS | Performed by: INTERNAL MEDICINE

## 2020-09-30 PROCEDURE — 1090F PRES/ABSN URINE INCON ASSESS: CPT | Performed by: INTERNAL MEDICINE

## 2020-10-01 LAB
25(OH)D3+25(OH)D2 SERPL-MCNC: 41 NG/ML (ref 30–100)
ALBUMIN SERPL-MCNC: 4.3 G/DL (ref 3.7–4.7)
ALBUMIN/GLOB SERPL: 1.6 {RATIO} (ref 1.2–2.2)
ALP SERPL-CCNC: 91 IU/L (ref 39–117)
ALT SERPL-CCNC: 16 IU/L (ref 0–32)
AST SERPL-CCNC: 21 IU/L (ref 0–40)
BILIRUB SERPL-MCNC: 0.3 MG/DL (ref 0–1.2)
BUN SERPL-MCNC: 27 MG/DL (ref 8–27)
BUN/CREAT SERPL: 31 (ref 12–28)
CALCIUM SERPL-MCNC: 9.8 MG/DL (ref 8.7–10.3)
CHLORIDE SERPL-SCNC: 104 MMOL/L (ref 96–106)
CO2 SERPL-SCNC: 26 MMOL/L (ref 20–29)
CREAT SERPL-MCNC: 0.87 MG/DL (ref 0.57–1)
ERYTHROCYTE [DISTWIDTH] IN BLOOD BY AUTOMATED COUNT: 11.8 % (ref 11.7–15.4)
EST. AVERAGE GLUCOSE BLD GHB EST-MCNC: 100 MG/DL
GLOBULIN SER CALC-MCNC: 2.7 G/DL (ref 1.5–4.5)
GLUCOSE SERPL-MCNC: 79 MG/DL (ref 65–99)
HBA1C MFR BLD: 5.1 % (ref 4.8–5.6)
HCT VFR BLD AUTO: 38.2 % (ref 34–46.6)
HGB BLD-MCNC: 12.7 G/DL (ref 11.1–15.9)
MCH RBC QN AUTO: 30.3 PG (ref 26.6–33)
MCHC RBC AUTO-ENTMCNC: 33.2 G/DL (ref 31.5–35.7)
MCV RBC AUTO: 91 FL (ref 79–97)
PLATELET # BLD AUTO: 235 X10E3/UL (ref 150–450)
POTASSIUM SERPL-SCNC: 4.4 MMOL/L (ref 3.5–5.2)
PROT SERPL-MCNC: 7 G/DL (ref 6–8.5)
RBC # BLD AUTO: 4.19 X10E6/UL (ref 3.77–5.28)
SODIUM SERPL-SCNC: 143 MMOL/L (ref 134–144)
TSH SERPL DL<=0.005 MIU/L-ACNC: 1.59 UIU/ML (ref 0.45–4.5)
VIT B12 SERPL-MCNC: 649 PG/ML (ref 232–1245)
WBC # BLD AUTO: 4.7 X10E3/UL (ref 3.4–10.8)

## 2020-10-06 ENCOUNTER — OFFICE VISIT (OUTPATIENT)
Dept: NEUROLOGY | Age: 70
End: 2020-10-06
Payer: MEDICARE

## 2020-10-06 VITALS
BODY MASS INDEX: 26.31 KG/M2 | HEIGHT: 60 IN | WEIGHT: 134 LBS | DIASTOLIC BLOOD PRESSURE: 66 MMHG | HEART RATE: 72 BPM | TEMPERATURE: 97.2 F | OXYGEN SATURATION: 98 % | SYSTOLIC BLOOD PRESSURE: 118 MMHG

## 2020-10-06 DIAGNOSIS — R20.0 NUMBNESS AND TINGLING: Primary | ICD-10-CM

## 2020-10-06 DIAGNOSIS — I10 ESSENTIAL HYPERTENSION: ICD-10-CM

## 2020-10-06 DIAGNOSIS — R20.2 NUMBNESS AND TINGLING: Primary | ICD-10-CM

## 2020-10-06 DIAGNOSIS — E55.9 VITAMIN D DEFICIENCY: ICD-10-CM

## 2020-10-06 PROCEDURE — 99204 OFFICE O/P NEW MOD 45 MIN: CPT | Performed by: PSYCHIATRY & NEUROLOGY

## 2020-10-06 PROCEDURE — G9899 SCRN MAM PERF RSLTS DOC: HCPCS | Performed by: PSYCHIATRY & NEUROLOGY

## 2020-10-06 PROCEDURE — 1101F PT FALLS ASSESS-DOCD LE1/YR: CPT | Performed by: PSYCHIATRY & NEUROLOGY

## 2020-10-06 PROCEDURE — 1090F PRES/ABSN URINE INCON ASSESS: CPT | Performed by: PSYCHIATRY & NEUROLOGY

## 2020-10-06 PROCEDURE — G8754 DIAS BP LESS 90: HCPCS | Performed by: PSYCHIATRY & NEUROLOGY

## 2020-10-06 PROCEDURE — 3017F COLORECTAL CA SCREEN DOC REV: CPT | Performed by: PSYCHIATRY & NEUROLOGY

## 2020-10-06 PROCEDURE — G8419 CALC BMI OUT NRM PARAM NOF/U: HCPCS | Performed by: PSYCHIATRY & NEUROLOGY

## 2020-10-06 PROCEDURE — G8399 PT W/DXA RESULTS DOCUMENT: HCPCS | Performed by: PSYCHIATRY & NEUROLOGY

## 2020-10-06 PROCEDURE — G8427 DOCREV CUR MEDS BY ELIG CLIN: HCPCS | Performed by: PSYCHIATRY & NEUROLOGY

## 2020-10-06 PROCEDURE — G8510 SCR DEP NEG, NO PLAN REQD: HCPCS | Performed by: PSYCHIATRY & NEUROLOGY

## 2020-10-06 PROCEDURE — G8536 NO DOC ELDER MAL SCRN: HCPCS | Performed by: PSYCHIATRY & NEUROLOGY

## 2020-10-06 PROCEDURE — G8752 SYS BP LESS 140: HCPCS | Performed by: PSYCHIATRY & NEUROLOGY

## 2020-10-06 RX ORDER — ACETAMINOPHEN 500 MG
1 TABLET ORAL ONCE
Qty: 1 KIT | Refills: 0 | Status: SHIPPED | OUTPATIENT
Start: 2020-10-06 | End: 2020-10-06

## 2020-10-06 NOTE — PATIENT INSTRUCTIONS
Office Policies    o Phone calls/patient messages:  Please allow up to 24 hours for someone in the office to contact you about your call or message. Be mindful your provider may be out of the office or your message may require further review. We encourage you to use [employer] for your messages as this is a faster, more efficient way to communicate with our office    o Medication Refills:  Prescription medications require up to 48 business hours to process. We encourage you to use [employer] for your refills. For controlled medications: Please allow up to 72 business hours to process. Certain medications may require you to  a written prescription at our office. NO narcotic/controlled medications will be prescribed after 4pm Monday through Friday or on weekends    o Form/Paperwork Completion:  We ask that you allow 7-14 business days. You may also download your forms to [employer] to have your doctor print off.

## 2020-10-06 NOTE — PROGRESS NOTES
NEUROLOGY HISTORY AND PHYSICAL    Name Juanito Greenfield Age 69 y.o. MRN [id number]  1950     Referring Physician: Pamela Bunn MD      Chief Complaint:  numbness     This is a 69 y.o. Right handed female who comes off the numb fingers of the right hand , especially the indecx finger, as well as the toes of both feet. She was working for the [employer] and retired ten years ago and then retired and started working  In [employer] . She mostly feels it when she is laying down     Assessment and Plan  1. Numbness and tingling  emg right side    2. Essential hypertension  Continue amlidpine    3. Vitamin D deficiency  Low end of normal       Allergies   Allergen Reactions    Tramadol Nausea Only           Current Outpatient Medications   Medication Sig    chlorthalidone (HYGROTEN) 25 mg tablet Take 1 Tab by mouth daily.  amLODIPine-benazepril (LotreL) 10-20 mg per capsule Take 1 Cap by mouth daily.  cholecalciferol, vitamin D3, (VITAMIN D3) 2,000 unit tab Take  by mouth.  MULTIVITAMIN PO Take  by mouth. No current facility-administered medications for this visit. Past Medical History:   Diagnosis Date    HTN (hypertension) 2010    Vitiligo 2010    Kala Odonnell, CFDEMI        Social History     Tobacco Use    Smoking status: Never Smoker    Smokeless tobacco: Never Used   Substance Use Topics    Alcohol use: Yes     Comment: rarely    Drug use: No      Family History   Problem Relation Age of Onset    Hypertension Sister     Stroke Sister 61    Diabetes Sister     Tuberculosis Sister     Stroke Mother 80        TIA    Cancer Father         Lung cancer     Review of Systems   Constitutional: Negative for chills and fever. HENT: Negative for ear pain. Eyes: Negative for pain and discharge. Respiratory: Negative for cough and hemoptysis. Cardiovascular: Negative for chest pain and claudication.    Gastrointestinal: Negative for constipation and diarrhea. Genitourinary: Negative for flank pain and hematuria. Musculoskeletal: Negative for back pain and myalgias. Skin: Negative for itching and rash. Neurological: Positive for sensory change. Negative for headaches. Endo/Heme/Allergies: Negative for environmental allergies. Does not bruise/bleed easily. Psychiatric/Behavioral: Negative for depression and hallucinations. Exam  Visit Vitals  /66   Pulse 72   Temp 97.2 °F (36.2 °C)   Ht 5' (1.524 m)   Wt 134 lb (60.8 kg)   LMP 06/10/2006   SpO2 98%   BMI 26.17 kg/m²         General: Well developed, well nourished. Patient in no distress   Head: Normocephalic, atraumatic, anicteric sclera   Neck Normal ROM, No thyromegally   Lungs:  Clear to auscultation    Cardiac: Regular rate and rhythm with no murmurs. Abd: Bowel sounds were audible   Ext: No pedal edema   Skin: Supple no rash     NeurologicExam:  Mental Status: Alert and oriented to person place and time   Speech: Fluent no aphasia or dysarthria. Cranial Nerves:   II-XII Intact    Motor:  Full and symmetric strength of upper and lower ext . Normal bulk and tone. Reflexes:   Deep tendon reflexes 2+/4 and symmetric. Sensory:   Symmetric and intact    Gait:  Gait is balanced    Tremor:   No tremor noted. Cerebellar:  Coordination intact. Neurovascular: No carotid bruits.  No JVD      Lab Review  Lab Results   Component Value Date/Time    WBC 4.7 09/30/2020 02:45 PM    HCT 38.2 09/30/2020 02:45 PM    HGB 12.7 09/30/2020 02:45 PM    PLATELET 283 54/95/6005 02:45 PM     Lab Results   Component Value Date/Time    Sodium 143 09/30/2020 02:45 PM    Potassium 4.4 09/30/2020 02:45 PM    Chloride 104 09/30/2020 02:45 PM    CO2 26 09/30/2020 02:45 PM    Glucose 79 09/30/2020 02:45 PM    BUN 27 09/30/2020 02:45 PM    Creatinine 0.87 09/30/2020 02:45 PM    Calcium 9.8 09/30/2020 02:45 PM     Lab Results   Component Value Date/Time    Vitamin B12 649 09/30/2020 02:45 PM     Lab Results   Component Value Date/Time    LDL, calculated 99 05/19/2020 11:37 AM     Lab Results   Component Value Date/Time    Hemoglobin A1c 5.1 09/30/2020 02:45 PM

## 2020-11-02 ENCOUNTER — OFFICE VISIT (OUTPATIENT)
Dept: NEUROLOGY | Age: 70
End: 2020-11-02
Payer: MEDICARE

## 2020-11-02 VITALS — TEMPERATURE: 97.7 F

## 2020-11-02 DIAGNOSIS — R20.0 NUMBNESS AND TINGLING: Primary | ICD-10-CM

## 2020-11-02 DIAGNOSIS — I10 ESSENTIAL HYPERTENSION: ICD-10-CM

## 2020-11-02 DIAGNOSIS — R20.2 NUMBNESS AND TINGLING: Primary | ICD-10-CM

## 2020-11-02 PROCEDURE — 95912 NRV CNDJ TEST 11-12 STUDIES: CPT | Performed by: PSYCHIATRY & NEUROLOGY

## 2020-11-02 NOTE — PROCEDURES
ELECTRODIAGNOSTIC REPORT      Test Date:  2020    Patient: Joseph Evans : 1950 Physician: Danis Orellana M.D.   ID#: [id number] SEX: Female Technician: Deann Nye     Patient History / Exam:  Sensory loss affecting the feet and the hands. Especially left fore fiinger. EMG & NCV Findings:  Evaluation of the right Fibular motor nerve showed normal distal onset latency (4.9 ms), normal amplitude (8.1 mV), normal conduction velocity (B Fib-Ankle, 49 m/s), and normal conduction velocity (Poplt-B Fib, 63 m/s). The right median motor nerve showed normal distal onset latency (3.7 ms), normal amplitude (6.9 mV), and normal conduction velocity (Elbow-Wrist, 50 m/s). The right tibial motor nerve showed normal distal onset latency (4.8 ms), normal amplitude (5.5 mV), and normal conduction velocity (Knee-Ankle, 49 m/s). The right ulnar motor nerve showed normal distal onset latency (3.0 ms), normal amplitude (8.4 mV), decreased conduction velocity (Wrist-Abd Dig Minimi, 27 m/s), normal conduction velocity (B Elbow-Wrist, 57 m/s), and normal conduction velocity (A Elbow-B Elbow, 59 m/s). The right median sensory nerve showed normal distal onset latency (2.8 ms), normal distal peak latency (3.6 ms), and normal amplitude (20.4 µV). The right radial sensory, the right sural sensory, and the right ulnar sensory nerves showed normal distal peak latency (R2.1, R3.2, R3.4 ms) and normal amplitude (R55.6, R146.0, R18.7 µV). The right Sup Fibular sensory nerve showed normal distal peak latency (3.0 ms), normal amplitude (19.2 µV), and normal conduction velocity (Lower leg-Lat ankle, 50 m/s). The right median/ulnar (dig IV) comparison nerve showed normal peak latency difference (Median Wr-Ulnar Wr, 0.2 ms). Impression  No evidence of neuropathy. This study does not exclude small fiber neuropathy.          ___________________________  Danis Orellana M.D.    Nerve Conduction Studies  Anti Sensory Summary Table     Stim Site NR Onset (ms) Peak (ms) O-P Amp (µV) Norm Peak (ms) Norm O-P Amp Site1 Site2 Dist (cm) Norm Warren (m/s)   Right Median Anti Sensory (2nd Digit)  33.7°C   Wrist    2.8 3.6 20.4 <4 >11 Wrist 2nd Digit 14.0    Right Radial Anti Sensory (Base 1st Digit)  33.7°C   Wrist    1.4 2.1 55.6 <2.8 7 Wrist Base 1st Digit 10.0    Right Sup Fibular Anti Sensory (Lat ankle)  33.7°C   Lower leg    2.0 3.0 19.2 <4.4 >5.0 Lower leg Lat ankle 10.0 >32   Right Sural Anti Sensory (Lat Mall)  33.7°C   Calf    0.9 3.2 146.0 <4.5 >4.0 Calf Lat Mall 14.0    Site 2    0.7 0.8 3.5         Right Ulnar Anti Sensory (5th Digit)  33.7°C   Wrist    2.3 3.4 18.7 <4.0 >10 Wrist 5th Digit 14.0      Motor Summary Table     Stim Site NR Onset (ms) Norm Onset (ms) O-P Amp (mV) Norm O-P Amp P-T Amp (mV) Site1 Site2 Dist (cm) Warren (m/s)   Right Fibular Motor (Ext Dig Brev)  33.7°C   Ankle    4.9 <6.5 8.1 >1.1  Ankle Ext Dig Brev 8.0 16   B Fib    11.0  7.2   B Fib Ankle 30.0 49   Poplt    12.6  6.6   Poplt B Fib 10.0 63   Right Median Motor (Abd Poll Brev)  33.7°C   Wrist    3.7 <4.5 6.9 >4.1  Wrist Abd Poll Brev 8.0 22   Elbow    7.3  6.1   Elbow Wrist 18.0 50   Right Tibial Motor (Abd Orosoc Brev)  33.7°C   Ankle    4.8 <6.1 5.5 >1.1  Ankle Abd Orosco Brev 8.0 17   Knee    12.5  4.2   Knee Ankle 38.0 49   Right Ulnar Motor (Abd Dig Minimi)  33.7°C   Wrist    3.0 <3.1 8.4 >7.0  Wrist Abd Dig Minimi 8.0 27   B Elbow    6.5  5.8   B Elbow Wrist 20.0 57   A Elbow    8.2  5.7   A Elbow B Elbow 10.0 59     Comparison Summary Table     Stim Site NR Peak (ms) P-T Amp (µV) Site1 Site2 Dist (cm) Delta-P (ms)   Right Median/Ulnar Dig IV Comparison (Digit 4)  33.7°C   Median Wr    3.8 18.1 Median Wr Ulnar Wr 0.0 0.2   Ulnar Wr    3.6 10.7           Waveforms:

## 2020-12-04 ENCOUNTER — TELEPHONE (OUTPATIENT)
Dept: INTERNAL MEDICINE CLINIC | Age: 70
End: 2020-12-04

## 2020-12-04 RX ORDER — CHLORTHALIDONE 25 MG/1
25 TABLET ORAL DAILY
Qty: 90 TAB | Refills: 0 | Status: SHIPPED | OUTPATIENT
Start: 2020-12-04 | End: 2021-02-26

## 2020-12-04 RX ORDER — AMLODIPINE AND BENAZEPRIL HYDROCHLORIDE 10; 20 MG/1; MG/1
1 CAPSULE ORAL DAILY
Qty: 90 CAP | Refills: 0 | Status: SHIPPED | OUTPATIENT
Start: 2020-12-04 | End: 2021-03-09

## 2020-12-04 NOTE — TELEPHONE ENCOUNTER
----- Message from [place] sent at 12/4/2020  9:47 AM EST -----  Regarding: Dr. Glenys Sanchez, refill  Contact: [phone]  Medication Refill    Caller (if not patient): n/a      Relationship of caller (if not patient): n/a      Best contact number(s): [phone]       Name of medication and dosage if known:   1.  amlodipine/benazepril 10/20 mg capsules (3-month supply)  2.  chlorthalidone 25 mg tablets (3-month supply)      Is patient out of this medication (yes/no):  No      Pharmacy name: [facility] pharmacy ([address])    Pharmacy listed in chart? (yes/no): on chart   Pharmacy phone number: on chart      Details to clarify the request: n/a      Message from [facility]

## 2020-12-04 NOTE — TELEPHONE ENCOUNTER
PCP: Mary Fuentes MD    Last appt: 9/30/2020  Future Appointments   Date Time Provider Ravindra Manuel   1/12/2021 10:20 AM Arun Bianchi MD [facility] BS AMB   3/30/2021 11:30 AM Mary Fuentes MD [facility] BS AMB       Requested Prescriptions     Pending Prescriptions Disp Refills    amLODIPine-benazepril (LotreL) 10-20 mg per capsule 90 Cap 0     Sig: Take 1 Cap by mouth daily.  chlorthalidone (HYGROTON) 25 mg tablet 90 Tab 0     Sig: Take 1 Tab by mouth daily.

## 2021-01-12 ENCOUNTER — VIRTUAL VISIT (OUTPATIENT)
Dept: NEUROLOGY | Age: 71
End: 2021-01-12
Payer: MEDICARE

## 2021-01-12 DIAGNOSIS — E55.9 VITAMIN D DEFICIENCY: ICD-10-CM

## 2021-01-12 DIAGNOSIS — I10 ESSENTIAL HYPERTENSION: ICD-10-CM

## 2021-01-12 DIAGNOSIS — R20.2 NUMBNESS AND TINGLING: Primary | ICD-10-CM

## 2021-01-12 DIAGNOSIS — R20.0 NUMBNESS AND TINGLING: Primary | ICD-10-CM

## 2021-01-12 PROCEDURE — 99443 PR PHYS/QHP TELEPHONE EVALUATION 21-30 MIN: CPT | Performed by: PSYCHIATRY & NEUROLOGY

## 2021-01-12 RX ORDER — GABAPENTIN 100 MG/1
100 CAPSULE ORAL 3 TIMES DAILY
Qty: 90 CAP | Refills: 3 | Status: SHIPPED | OUTPATIENT
Start: 2021-01-12 | End: 2021-05-18 | Stop reason: SDUPTHER

## 2021-01-12 NOTE — PROGRESS NOTES
Follow-up Visit    Name Corey Sharp Age 69 y.o. MRN [id number]  1950       Chief Complaint: Numbness    Numbness of the right index and finger persists but has not worsened since she was last seen. Numbness of the right lower extremity toes has resolved. She finds the persisting numbness aggravating would like a medication to alleviate the sensation. We discussed Neurontin. Assesment and Plan  1. Numbness and tingling  - gabapentin (NEURONTIN) 100 mg capsule; Take 1 Cap by mouth three (3) times daily. Max Daily Amount: 300 mg. Dispense: 90 Cap; Refill: 3    2. Essential hypertension  Continue Lotrel    3. Vitamin D deficiency  Continue vitamin D      Allergies  Tramadol     Medications  Current Outpatient Medications   Medication Sig    gabapentin (NEURONTIN) 100 mg capsule Take 1 Cap by mouth three (3) times daily. Max Daily Amount: 300 mg.    amLODIPine-benazepril (LotreL) 10-20 mg per capsule Take 1 Cap by mouth daily.  chlorthalidone (HYGROTON) 25 mg tablet Take 1 Tab by mouth daily.  cholecalciferol, vitamin D3, (VITAMIN D3) 2,000 unit tab Take  by mouth.  MULTIVITAMIN PO Take  by mouth. No current facility-administered medications for this visit. Medical History  Past Medical History:   Diagnosis Date    HTN (hypertension) 2010    Vitiligo 2010    ALEXUS Ann       Review of Systems   Eyes: Negative for blurred vision and double vision. Respiratory: Negative for cough and shortness of breath. Cardiovascular: Negative for chest pain, palpitations and orthopnea. Gastrointestinal: Negative for nausea and vomiting. Neurological: Positive for sensory change. Negative for dizziness and headaches. Psychiatric/Behavioral: Negative for depression. The patient is not nervous/anxious.         Exam:  Visit Vitals  LMP 06/10/2006          Lab Review  Lab Results   Component Value Date/Time    WBC 4.7 2020 02:45 PM    HCT 38.2 09/30/2020 02:45 PM    HGB 12.7 09/30/2020 02:45 PM    PLATELET 539 23/41/5896 02:45 PM       Lab Results   Component Value Date/Time    Sodium 143 09/30/2020 02:45 PM    Potassium 4.4 09/30/2020 02:45 PM    Chloride 104 09/30/2020 02:45 PM    CO2 26 09/30/2020 02:45 PM    Glucose 79 09/30/2020 02:45 PM    BUN 27 09/30/2020 02:45 PM    Creatinine 0.87 09/30/2020 02:45 PM    Calcium 9.8 09/30/2020 02:45 PM         Lab Results   Component Value Date/Time    Hemoglobin A1c 5.1 09/30/2020 02:45 PM        Lab Results   Component Value Date/Time    Vitamin B12 649 09/30/2020 02:45 PM           Lab Results   Component Value Date/Time    Cholesterol, total 170 05/19/2020 11:37 AM    HDL Cholesterol 55 05/19/2020 11:37 AM    LDL, calculated 99 05/19/2020 11:37 AM    VLDL, calculated 16 05/19/2020 11:37 AM    Triglyceride 81 05/19/2020 11:37 AM     I was in the office while conducting this encounter. Consent:  She and/or her healthcare decision maker is aware that this patient-initiated Telehealth encounter is a billable service, with coverage as determined by her insurance carrier. She is aware that she may receive a bill and has provided verbal consent to proceed: Yes    This virtual visit was conducted telephone encounter only. -  I affirm this is a Patient Initiated Episode with an Established Patient who has not had a related appointment within my department in the past 7 days or scheduled within the next 24 hours. Note: this encounter is not billable if this call serves to triage the patient into an appointment for the relevant concern. Total Time: minutes: 21-30 minutes.

## 2021-02-26 RX ORDER — CHLORTHALIDONE 25 MG/1
TABLET ORAL
Qty: 90 TAB | Refills: 0 | Status: SHIPPED | OUTPATIENT
Start: 2021-02-26 | End: 2021-06-08 | Stop reason: SDUPTHER

## 2021-03-02 ENCOUNTER — TELEPHONE (OUTPATIENT)
Dept: NEUROLOGY | Age: 71
End: 2021-03-02

## 2021-03-09 RX ORDER — AMLODIPINE AND BENAZEPRIL HYDROCHLORIDE 10; 20 MG/1; MG/1
CAPSULE ORAL
Qty: 90 CAP | Refills: 0 | Status: SHIPPED | OUTPATIENT
Start: 2021-03-09 | End: 2021-06-08 | Stop reason: SDUPTHER

## 2021-03-15 ENCOUNTER — VIRTUAL VISIT (OUTPATIENT)
Dept: NEUROLOGY | Age: 71
End: 2021-03-15
Payer: MEDICARE

## 2021-03-15 DIAGNOSIS — R20.0 NUMBNESS AND TINGLING: ICD-10-CM

## 2021-03-15 DIAGNOSIS — R20.2 NUMBNESS AND TINGLING: ICD-10-CM

## 2021-03-15 DIAGNOSIS — M79.644 FINGER PAIN, RIGHT: Primary | ICD-10-CM

## 2021-03-15 DIAGNOSIS — I10 ESSENTIAL HYPERTENSION: ICD-10-CM

## 2021-03-15 PROCEDURE — 99443 PR PHYS/QHP TELEPHONE EVALUATION 21-30 MIN: CPT | Performed by: PSYCHIATRY & NEUROLOGY

## 2021-03-15 NOTE — PROGRESS NOTES
Follow-up Visit    Name Lindbergh Gaucher Age 69 y.o. MRN [id number]  1950       Chief Complaint: finger pain    Returns for follow up. Continues to have unsettling pain of the right index finger. She is not sure if it's in her joints but notes that it is close to the last interphalangeal joint. She notes its worse with use. She continues to have sensory loss below the toes of her right lower extremity but this has substantially subsided. Assesment and Plan  1. Finger pain, right  Continue gabapentin  - XR HAND RT MIN 3 V; Future    2. Essential hypertension   Continue Lotrel    3. Numbness right lower extremity  Substantially improved with gabapentin  Allergies  Tramadol     Medications  Current Outpatient Medications   Medication Sig    amLODIPine-benazepril (LOTREL) 10-20 mg per capsule TAKE 1 CAPSULE EVERY DAY    chlorthalidone (HYGROTON) 25 mg tablet TAKE 1 TABLET EVERY DAY    gabapentin (NEURONTIN) 100 mg capsule Take 1 Cap by mouth three (3) times daily. Max Daily Amount: 300 mg.  cholecalciferol, vitamin D3, (VITAMIN D3) 2,000 unit tab Take  by mouth.  MULTIVITAMIN PO Take  by mouth. No current facility-administered medications for this visit. Medical History  Past Medical History:   Diagnosis Date    HTN (hypertension) 2010    Vitiligo 2010    ALEXUS Malone       Review of Systems   Constitutional: Negative for chills and fever. HENT: Negative for ear pain. Eyes: Negative for pain and discharge. Respiratory: Negative for cough and hemoptysis. Cardiovascular: Negative for chest pain and claudication. Gastrointestinal: Negative for constipation and diarrhea. Genitourinary: Negative for flank pain and hematuria. Musculoskeletal: Positive for joint pain. Negative for back pain and myalgias. Skin: Negative for itching and rash. Neurological: Positive for sensory change. Negative for headaches.    Endo/Heme/Allergies: Negative for environmental allergies. Does not bruise/bleed easily. Psychiatric/Behavioral: Negative for depression and hallucinations. Exam:  Visit Vitals  LMP 06/10/2006          Lab Review  Lab Results   Component Value Date/Time    WBC 4.7 09/30/2020 02:45 PM    HCT 38.2 09/30/2020 02:45 PM    HGB 12.7 09/30/2020 02:45 PM    PLATELET 584 44/60/9755 02:45 PM       Lab Results   Component Value Date/Time    Sodium 143 09/30/2020 02:45 PM    Potassium 4.4 09/30/2020 02:45 PM    Chloride 104 09/30/2020 02:45 PM    CO2 26 09/30/2020 02:45 PM    Glucose 79 09/30/2020 02:45 PM    BUN 27 09/30/2020 02:45 PM    Creatinine 0.87 09/30/2020 02:45 PM    Calcium 9.8 09/30/2020 02:45 PM       Lab Results   Component Value Date/Time    Hemoglobin A1c 5.1 09/30/2020 02:45 PM        Lab Results   Component Value Date/Time    Vitamin B12 649 09/30/2020 02:45 PM         Lab Results   Component Value Date/Time    Cholesterol, total 170 05/19/2020 11:37 AM    HDL Cholesterol 55 05/19/2020 11:37 AM    LDL, calculated 99 05/19/2020 11:37 AM    VLDL, calculated 16 05/19/2020 11:37 AM    Triglyceride 81 05/19/2020 11:37 AM     I was in the office while conducting this encounter. Consent:  She and/or her healthcare decision maker is aware that this patient-initiated Telehealth encounter is a billable service, with coverage as determined by her insurance carrier. She is aware that she may receive a bill and has provided verbal consent to proceed: Yes    This virtual visit was conducted telephone encounter only. -  I affirm this is a Patient Initiated Episode with an Established Patient who has not had a related appointment within my department in the past 7 days or scheduled within the next 24 hours. Note: this encounter is not billable if this call serves to triage the patient into an appointment for the relevant concern. Total Time: minutes: 21-30 minutes.

## 2021-03-29 NOTE — PROGRESS NOTES
HISTORY OF PRESENT ILLNESS  Erin Santos is a 69 y.o. female. HPI     Last here 9/30/20 Pt is here to f/u on chronic conditions.       Lov, she c/o tingling in fingers, gave gabapentin - this has been helping some  still feel tightness/tingling in fingers when she wakes up   Discussed if hand bothers her more she can see hand surgeon, doing well for now     bp today is 124/68  BP at home 126/74 117/70  Continues lotrel 10-20mg and chlorthalidone 25mg daily     Wt is 131 lbs - down 3 lbs x lov    Her wt is close to nl range      Reviewed labs   will get labs today      saw Dr. Melchor Abad (cardio) for her palpitations in the past  Last visit was10/3/17  Her sx have since resolved      Follows with Dr. Amber Allen (neuro) for numbness and tingling  Reviewed note 3/15/21: 1. Finger pain, right  Continue gabapentin  - XR HAND RT MIN 3 V; Future  2. Essential hypertension   Continue Lotrel  3. Numbness right lower extremity  Substantially improved with gabapentin      Pt follows with Dr. Waqas Naranjo (derm)    Last visit was 4/17   Pt states she only f/u if she has a problem      Pt saw Dr Cristiane Reynaga (vascular) for blood flow in legs   No swelling in legs currently   Last visit 3/19       Continues vit D OTC 2000U daily   Fosamax was started in 4/18, but pt stopped taking this after trying twice  She stated that she \"feels good without it\"  Declines further eval /tx    Has safety equip, drinks alcohol occasionally, no issues with hearing  Pt lives with     Pt is functionally independent       No memory concerns   Knows the month, date, year, location   Can recall 3/3 objects       ACP not on file. SDMs are [employer]  () or her daughter.    Provided information today.      PREVENTIVE:    Colonoscopy: 5/11/18 at [facility]  per pt 5 year repeat  Pap: 5/19 20 sandra no   Mammogram: [facility],  5/20 negative, due 5/21 reminded  DEXA: 4/5/18, osteoporosis fosamax failed--declines further tx or eval   Tdap: 2/10/2016  Pneumovax: 2014  [id number]: 18  Shingrix: declines  Flu shot:  20  A1c:  nl, 3/17 5.4  5.1  Eye exam: Sarthak Velazquez ([phone]),    Lipids:   LDL 99  EK17, new T inversions in the anterior leads  Hep C screen: , negative  Covid: completed both (Yumi Mosqueda)    Patient Active Problem List    Diagnosis Date Noted    Osteopenia 2017    Essential hypertension 2016    Symptomatic menopausal or female climacteric states 10/25/2013    Bursitis of shoulder, right 2012    Vitiligo 2010     Current Outpatient Medications   Medication Sig Dispense Refill    amLODIPine-benazepril (LOTREL) 10-20 mg per capsule TAKE 1 CAPSULE EVERY DAY 90 Cap 0    chlorthalidone (HYGROTON) 25 mg tablet TAKE 1 TABLET EVERY DAY 90 Tab 0    gabapentin (NEURONTIN) 100 mg capsule Take 1 Cap by mouth three (3) times daily. Max Daily Amount: 300 mg. 90 Cap 3    cholecalciferol, vitamin D3, (VITAMIN D3) 2,000 unit tab Take  by mouth.  MULTIVITAMIN PO Take  by mouth.        Past Surgical History:   Procedure Laterality Date    ENDOSCOPY, COLON, DIAGNOSTIC      hyperplastic polyps; 10 year repeat; Dr. Lux Leblanc    FOOT/TOES SURGERY [facility]      Bone spurs removed R & L foot     HX HEENT  2013    cataract removal    Molly Darby    HX TUBAL LIGATION        Lab Results   Component Value Date/Time    WBC 4.7 2020 02:45 PM    HGB 12.7 2020 02:45 PM    HCT 38.2 2020 02:45 PM    PLATELET 586  02:45 PM    MCV 91 2020 02:45 PM     Lab Results   Component Value Date/Time    Cholesterol, total 170 2020 11:37 AM    HDL Cholesterol 55 2020 11:37 AM    LDL, calculated 99 2020 11:37 AM    Triglyceride 81 2020 11:37 AM     Lab Results   Component Value Date/Time    GFR est non-AA 67 2020 02:45 PM    GFR est AA 78 2020 02:45 PM    Creatinine 0.87 2020 02:45 PM BUN 27 09/30/2020 02:45 PM    Sodium 143 09/30/2020 02:45 PM    Potassium 4.4 09/30/2020 02:45 PM    Chloride 104 09/30/2020 02:45 PM    CO2 26 09/30/2020 02:45 PM        Review of Systems   Respiratory: Negative for shortness of breath. Cardiovascular: Negative for chest pain. Physical Exam  Constitutional:       General: She is not in acute distress. Appearance: Normal appearance. She is not ill-appearing, toxic-appearing or diaphoretic. HENT:      Head: Normocephalic and atraumatic. Right Ear: External ear normal.      Left Ear: External ear normal.   Eyes:      General:         Right eye: No discharge. Left eye: No discharge. Conjunctiva/sclera: Conjunctivae normal.      Pupils: Pupils are equal, round, and reactive to light. Neck:      Musculoskeletal: Normal range of motion and neck supple. Vascular: No carotid bruit. Cardiovascular:      Rate and Rhythm: Normal rate and regular rhythm. Pulses: Normal pulses. Heart sounds: Normal heart sounds. No murmur. No friction rub. No gallop. Pulmonary:      Effort: No respiratory distress. Breath sounds: Normal breath sounds. No wheezing or rales. Chest:      Chest wall: No tenderness. Musculoskeletal: Normal range of motion. Right lower leg: No edema. Left lower leg: No edema. Skin:     General: Skin is warm and dry. Neurological:      Mental Status: She is alert and oriented to person, place, and time. Mental status is at baseline. Coordination: Coordination normal.      Gait: Gait normal.   Psychiatric:         Mood and Affect: Mood normal.         Behavior: Behavior normal.         ASSESSMENT and PLAN    ICD-10-CM ICD-9-CM    1. Essential hypertension       Controlled on Lotrel and chlorthalidone continue no change dose     A27 592.3 METABOLIC PANEL, COMPREHENSIVE      LIPID PANEL   2.  Osteopenia, unspecified location  P11.06 213.93 METABOLIC PANEL, COMPREHENSIVE       Vitamin D for treatment was on Fosamax and unable to take this in the past declines further evaluation   LIPID PANEL   3. Neuropathy  I25.8 746.1 METABOLIC PANEL, COMPREHENSIVE   Improved with gabapentin discussed hand surgeon if hand symptoms became more prominent   LIPID PANEL   4. Medicare annual wellness visit, subsequent  T66.87 S62.4 METABOLIC PANEL, COMPREHENSIVE      LIPID PANEL   5. Mixed hyperlipidemia  P42.0 975.2 METABOLIC PANEL, COMPREHENSIVE   Diet controlled check lipids   LIPID PANEL      Depression screen reviewed and negative      Scribed by Cesar Novoa of [address] [place] [address] [address], as dictated by Dr. Yuko Page. Current diagnosis and concerns discussed with pt at length. Pt understands risks and benefits or current treatment plan and medications, and accepts the treatment and medication with any possible risks. Pt asks appropriate questions, which were answered. Pt was instructed to call with any concerns or problems. I have reviewed the note documented by the scribe. The services provided are my own.   The documentation is accurate

## 2021-03-30 ENCOUNTER — OFFICE VISIT (OUTPATIENT)
Dept: INTERNAL MEDICINE CLINIC | Age: 71
End: 2021-03-30
Payer: MEDICARE

## 2021-03-30 VITALS
WEIGHT: 131 LBS | BODY MASS INDEX: 25.72 KG/M2 | SYSTOLIC BLOOD PRESSURE: 124 MMHG | HEIGHT: 60 IN | RESPIRATION RATE: 16 BRPM | TEMPERATURE: 98.3 F | HEART RATE: 76 BPM | OXYGEN SATURATION: 98 % | DIASTOLIC BLOOD PRESSURE: 68 MMHG

## 2021-03-30 DIAGNOSIS — I10 ESSENTIAL HYPERTENSION: Primary | ICD-10-CM

## 2021-03-30 DIAGNOSIS — E78.2 MIXED HYPERLIPIDEMIA: ICD-10-CM

## 2021-03-30 DIAGNOSIS — M85.80 OSTEOPENIA, UNSPECIFIED LOCATION: ICD-10-CM

## 2021-03-30 DIAGNOSIS — Z00.00 MEDICARE ANNUAL WELLNESS VISIT, SUBSEQUENT: ICD-10-CM

## 2021-03-30 DIAGNOSIS — G62.9 NEUROPATHY: ICD-10-CM

## 2021-03-30 PROCEDURE — 1101F PT FALLS ASSESS-DOCD LE1/YR: CPT | Performed by: INTERNAL MEDICINE

## 2021-03-30 PROCEDURE — 99213 OFFICE O/P EST LOW 20 MIN: CPT | Performed by: INTERNAL MEDICINE

## 2021-03-30 PROCEDURE — G8399 PT W/DXA RESULTS DOCUMENT: HCPCS | Performed by: INTERNAL MEDICINE

## 2021-03-30 PROCEDURE — G0439 PPPS, SUBSEQ VISIT: HCPCS | Performed by: INTERNAL MEDICINE

## 2021-03-30 PROCEDURE — G8419 CALC BMI OUT NRM PARAM NOF/U: HCPCS | Performed by: INTERNAL MEDICINE

## 2021-03-30 PROCEDURE — G9899 SCRN MAM PERF RSLTS DOC: HCPCS | Performed by: INTERNAL MEDICINE

## 2021-03-30 PROCEDURE — G8536 NO DOC ELDER MAL SCRN: HCPCS | Performed by: INTERNAL MEDICINE

## 2021-03-30 PROCEDURE — 3017F COLORECTAL CA SCREEN DOC REV: CPT | Performed by: INTERNAL MEDICINE

## 2021-03-30 PROCEDURE — G8752 SYS BP LESS 140: HCPCS | Performed by: INTERNAL MEDICINE

## 2021-03-30 PROCEDURE — G8754 DIAS BP LESS 90: HCPCS | Performed by: INTERNAL MEDICINE

## 2021-03-30 PROCEDURE — G8510 SCR DEP NEG, NO PLAN REQD: HCPCS | Performed by: INTERNAL MEDICINE

## 2021-03-30 PROCEDURE — 1090F PRES/ABSN URINE INCON ASSESS: CPT | Performed by: INTERNAL MEDICINE

## 2021-03-30 PROCEDURE — G8427 DOCREV CUR MEDS BY ELIG CLIN: HCPCS | Performed by: INTERNAL MEDICINE

## 2021-03-30 NOTE — PROGRESS NOTES
This is the Subsequent Medicare Annual Wellness Exam, performed 12 months or more after the Initial AWV or the last Subsequent AWV    I have reviewed the patient's medical history in detail and updated the computerized patient record. Depression Risk Factor Screening:     3 most recent PHQ Screens 3/30/2021   Little interest or pleasure in doing things Not at all   Feeling down, depressed, irritable, or hopeless Not at all   Total Score PHQ 2 0       Alcohol Risk Screen    Do you average more than 1 drink per night or more than 7 drinks a week:  No    On any one occasion in the past three months have you have had more than 3 drinks containing alcohol:  No        Functional Ability and Level of Safety:    Hearing: Hearing is good. Activities of Daily Living: The home contains: handrails and grab bars  Patient does total self care      Ambulation: with no difficulty     Fall Risk:  Fall Risk Assessment, last 12 mths 3/30/2021   Able to walk? Yes   Fall in past 12 months? 0      Abuse Screen:  Patient is not abused     Lives w   Cognitive Screening    Has your family/caregiver stated any concerns about your memory: no     Cognitive Screening: Normal - Mini Cog Test      No memory concerns   Pt knows the month, day and year   Can recall 3/3 objects   Assessment/Plan   Education and counseling provided:  Are appropriate based on today's review and evaluation  End-of-Life planning (with patient's consent)  Pneumococcal Vaccine  Influenza Vaccine  Screening Mammography  Screening Pap and pelvic (covered once every 2 years)  Cardiovascular screening blood test  Bone mass measurement (DEXA)  Screening for glaucoma    Diagnoses and all orders for this visit:    1. Essential hypertension    2. Osteopenia, unspecified location    3. Neuropathy    4.  Medicare annual wellness visit, subsequent        Health Maintenance Due     Health Maintenance Due   Topic Date Due    Shingrix Vaccine Age 50> (1 of 2) Never done    Pneumococcal 65+ years (2 of 2 - PPSV23) 09/26/2019       Patient Care Team   Patient Care Team:  Prem Hazel MD as PCP - General (Internal Medicine)  Prem Hazel MD as PCP - [facility]  Aguilar Simon MD (Dermatology)  Corey Durant MD (Gastroenterology)  Derrek Wasserman MD (Gynecology)  Jessa Martinez MD (Cardiology)  Christian Jennings MD (Vascular Surgery)     Updated    History     Patient Active Problem List   Diagnosis Code    Vitiligo L80    Bursitis of shoulder, right M75.51    Symptomatic menopausal or female climacteric states N95.1    Essential hypertension I10    Osteopenia M85.80     Past Medical History:   Diagnosis Date    HTN (hypertension) 5/13/2010    Vitiligo 5/13/2010    ALEXUS Stokes      Past Surgical History:   Procedure Laterality Date    ENDOSCOPY, COLON, DIAGNOSTIC  2003    hyperplastic polyps; 10 year repeat; Dr. Lavinia Yee    FOOT[facility]TOES SURGERY [facility]      Bone spurs removed R & L foot     HX HEENT  03/2013    cataract removal    [facility]    Dr. Eric Nava HX TUBAL LIGATION       Current Outpatient Medications   Medication Sig Dispense Refill    amLODIPine-benazepril (LOTREL) 10-20 mg per capsule TAKE 1 CAPSULE EVERY DAY 90 Cap 0    chlorthalidone (HYGROTON) 25 mg tablet TAKE 1 TABLET EVERY DAY 90 Tab 0    gabapentin (NEURONTIN) 100 mg capsule Take 1 Cap by mouth three (3) times daily. Max Daily Amount: 300 mg. 90 Cap 3    cholecalciferol, vitamin D3, (VITAMIN D3) 2,000 unit tab Take  by mouth.  MULTIVITAMIN PO Take  by mouth.        Allergies   Allergen Reactions    Tramadol Nausea Only       Family History   Problem Relation Age of Onset    Hypertension Sister     Stroke Sister 61    Diabetes Sister     Tuberculosis Sister     Stroke Mother 80        TIA    Cancer Father         Lung cancer     Social History     Tobacco Use    Smoking status: Never Smoker    Smokeless tobacco: Never Used Substance Use Topics    Alcohol use: Yes     Comment: rarely   ACP not on file. SDMs are [employer]  () or her daughter. Provided information today.        Colonoscopy: 18 at [facility]  per pt 5 year repeat  Pap:  sandra on  due this spring  Mammogram: [facility],   negative, due  reminded  DEXA: 18, osteoporosis fosamax failed--declines further tx or eval     Tdap: 2/10/2016  Pneumovax: 2014  Qnnsnav30: 18  Shingrix: declines  Flu shot:  20      A1c:  5.1 annual  Lipids:   LDL 99 annual    Eye exam: [employer] ([phone]), 8/10/02         EK17,T inversions in the anterior leads    Hep C screen: , negative  Covid: completed both (Henrietta Alegria)      Medication reconciliation completed by MA and reviewed by me. Medical/surgical/social/family history reviewed and updated by me. Patient provided AVS and preventative screening table. Patient verbalized understanding of all information discussed.

## 2021-03-30 NOTE — PATIENT INSTRUCTIONS
Medicare Wellness Visit, Female The best way to live healthy is to have a lifestyle where you eat a well-balanced diet, exercise regularly, limit alcohol use, and quit all forms of tobacco/nicotine, if applicable. Regular preventive services are another way to keep healthy. Preventive services (vaccines, screening tests, monitoring & exams) can help personalize your care plan, which helps you manage your own care. Screening tests can find health problems at the earliest stages, when they are easiest to treat. [facility] follows the current, evidence-based guidelines published by the [facility] Sarabjit White ([facility]) when recommending preventive services for our patients. Because we follow these guidelines, sometimes recommendations change over time as research supports it. (For example, mammograms used to be recommended annually. Even though Medicare will still pay for an annual mammogram, the newer guidelines recommend a mammogram every two years for women of average risk). Of course, you and your doctor may decide to screen more often for some diseases, based on your risk and your co-morbidities (chronic disease you are already diagnosed with). Preventive services for you include: - Medicare offers their members a free annual wellness visit, which is time for you and your primary care provider to discuss and plan for your preventive service needs. Take advantage of this benefit every year! 
-All adults over the age of 72 should receive the recommended pneumonia vaccines. Current USPSTF guidelines recommend a series of two vaccines for the best pneumonia protection.  
-All adults should have a flu vaccine yearly and a tetanus vaccine every 10 years.  
-All adults age 48 and older should receive the shingles vaccines (series of two vaccines).      
-All adults age 38-68 who are overweight should have a diabetes screening test once every three years.  
-All adults born between [birth year] and 1966 should be screened once for Hepatitis C. 
-Other screening tests and preventive services for persons with diabetes include: an eye exam to screen for diabetic retinopathy, a kidney function test, a foot exam, and stricter control over your cholesterol.  
-Cardiovascular screening for adults with routine risk involves an electrocardiogram (ECG) at intervals determined by your doctor.  
-Colorectal cancer screenings should be done for adults age 54-65 with no increased risk factors for colorectal cancer. There are a number of acceptable methods of screening for this type of cancer. Each test has its own benefits and drawbacks. Discuss with your doctor what is most appropriate for you during your annual wellness visit. The different tests include: colonoscopy (considered the best screening method), a fecal occult blood test, a fecal DNA test, and sigmoidoscopy. 
 
-A bone mass density test is recommended when a woman turns 65 to screen for osteoporosis. This test is only recommended one time, as a screening. Some providers will use this same test as a disease monitoring tool if you already have osteoporosis. -Breast cancer screenings are recommended every other year for women of normal risk, age 54-69. 
-Cervical cancer screenings for women over age 72 are only recommended with certain risk factors. Here is a list of your current Health Maintenance items (your personalized list of preventive services) with a due date: 
Health Maintenance Due Topic Date Due  Shingles Vaccine (1 of 2) Never done  Pneumococcal Vaccine (2 of 2 - PPSV23) 09/26/2019

## 2021-04-02 ENCOUNTER — TELEPHONE (OUTPATIENT)
Dept: NEUROLOGY | Age: 71
End: 2021-04-02

## 2021-04-02 NOTE — TELEPHONE ENCOUNTER
Confirmed patient id and advised of number to call to schedule her xray  Patient gave verbal understanding

## 2021-04-02 NOTE — TELEPHONE ENCOUNTER
----- Message from [employer] sent at 4/2/2021  9:48 AM EDT -----  Regarding: Dr. Josesito Allison Message/Vendor Calls    Caller's first and last name:  Patent       Reason for call: Tyler Meza required yes/no and why: Yes.  To schedule       Best contact number(s):  [phone]      Details to clarify the request: Patient has reconsidered and would like to have the xray scheduled      Crystal MAURO Leary

## 2021-04-05 ENCOUNTER — HOSPITAL ENCOUNTER (OUTPATIENT)
Dept: GENERAL RADIOLOGY | Age: 71
Discharge: HOME | End: 2021-04-05
Payer: MEDICARE

## 2021-04-05 DIAGNOSIS — M79.644 FINGER PAIN, RIGHT: ICD-10-CM

## 2021-04-05 PROCEDURE — 73130 X-RAY EXAM OF HAND: CPT

## 2021-04-19 ENCOUNTER — TELEPHONE (OUTPATIENT)
Dept: INTERNAL MEDICINE CLINIC | Age: 71
End: 2021-04-19

## 2021-04-19 NOTE — TELEPHONE ENCOUNTER
----- Message from Tami Merchant sent at 4/19/2021 10:27 AM EDT -----  Regarding: /Telephone  General Message/Vendor Calls    Caller's first and last name: Pt       Reason for call: She is requesting a call back to speak with Dr. Kenyetta Fisher.  No further details given       Callback required yes/no and why: yes       Best contact number(s): [phone] if she doesn't answer then try her cell phone [phone]      Details to clarify the request: Pt would like a call back today       Message from [facility]

## 2021-04-21 NOTE — TELEPHONE ENCOUNTER
----- Message from Gaviota Redman sent at 4/21/2021 10:58 AM EDT -----  Regarding: Dr Friddie Lesches refill rx  General Message/Vendor Calls    Caller's first and last name: Trell Weiss      Reason for call:x-ray results of Hand and finger x-ray 4/2/21 and discuss concerns about referred neurologis      Callback required yes/no and why:      Best contact number(s):[phone]      Details to clarify the request: Ms. Lisa Redman is requesting a call back to obtain x-ray results of Hand and finger x-ray 4/2/21 and discuss concerns about referred neurologist. Pt advised that the Gabapentine prescribed is not working      Message from Katie Barragan

## 2021-04-26 NOTE — TELEPHONE ENCOUNTER
Called out and spoke to pt. Two pt identifiers confirmed. Informed pt of XR results. Pt states some lingering sx. Gave pt contact info to Ortho hand Dr. Miki Perez verbalized understanding of information discussed w/ no further questions at this time.

## 2021-05-18 DIAGNOSIS — R20.2 NUMBNESS AND TINGLING: ICD-10-CM

## 2021-05-18 DIAGNOSIS — R20.0 NUMBNESS AND TINGLING: ICD-10-CM

## 2021-05-18 NOTE — TELEPHONE ENCOUNTER
Medication Refill     Caller (if not patient):Pt       Relationship of caller (if not patient):Pt       Best contact number(s):[phone]       Name of medication and dosage if known: Gabapentin 100 mg GRA 1 tab 3x/day       Is patient out of this medication (yes/no): yes       Pharmacy name:[facility] [address] listed in chart? (yes/no):yes   Pharmacy phone [phone]       Details to clarify the request:Pt is requesting a refill for 1 month Gabapentin 100 mg GRA 1 tab 3x/day to be called to the pharmacy on file. Pt request subsequent refills to be called to the [facility] Mail order supply.      Message from [facility]

## 2021-05-19 NOTE — TELEPHONE ENCOUNTER
Future Appointments:  Future Appointments   Date Time Provider Ravindra Manuel   9/28/2021 11:30 AM Law Coleman MD [facility] BS AMB        Last Appointment With Me:  3/30/2021     Requested Prescriptions     Pending Prescriptions Disp Refills    gabapentin (NEURONTIN) 100 mg capsule 90 Capsule 3     Sig: Take 1 Capsule by mouth three (3) times daily. Max Daily Amount: 300 mg.

## 2021-05-20 RX ORDER — GABAPENTIN 100 MG/1
100 CAPSULE ORAL 3 TIMES DAILY
Qty: 90 CAPSULE | Refills: 3 | Status: SHIPPED | OUTPATIENT
Start: 2021-05-20 | End: 2021-08-31 | Stop reason: SDUPTHER

## 2021-08-02 DIAGNOSIS — R20.2 NUMBNESS AND TINGLING: ICD-10-CM

## 2021-08-02 DIAGNOSIS — R20.0 NUMBNESS AND TINGLING: ICD-10-CM

## 2021-08-02 RX ORDER — GABAPENTIN 100 MG/1
100 CAPSULE ORAL 3 TIMES DAILY
Qty: 90 CAPSULE | Refills: 3 | Status: CANCELLED | OUTPATIENT
Start: 2021-08-02

## 2021-08-02 NOTE — TELEPHONE ENCOUNTER
Pt advised of 48/72 hour turn around on refills, but would see what could be done. Pt would like a call back to let her know what you have done.

## 2021-08-02 NOTE — TELEPHONE ENCOUNTER
Patient states to Please Disregard Previous Call as her Medication came in the Mail [facility] after she left the Message. No Call back needed.  Thank you

## 2021-08-31 DIAGNOSIS — R20.0 NUMBNESS AND TINGLING: ICD-10-CM

## 2021-08-31 DIAGNOSIS — R20.2 NUMBNESS AND TINGLING: ICD-10-CM

## 2021-08-31 DIAGNOSIS — I10 ESSENTIAL HYPERTENSION: Primary | ICD-10-CM

## 2021-08-31 RX ORDER — CHLORTHALIDONE 25 MG/1
25 TABLET ORAL DAILY
Qty: 90 TABLET | Refills: 0 | Status: SHIPPED | OUTPATIENT
Start: 2021-08-31 | End: 2021-11-30 | Stop reason: SDUPTHER

## 2021-08-31 RX ORDER — AMLODIPINE AND BENAZEPRIL HYDROCHLORIDE 10; 20 MG/1; MG/1
CAPSULE ORAL
Qty: 90 CAPSULE | Refills: 0 | Status: SHIPPED | OUTPATIENT
Start: 2021-08-31 | End: 2021-11-30 | Stop reason: SDUPTHER

## 2021-08-31 RX ORDER — GABAPENTIN 100 MG/1
100 CAPSULE ORAL 3 TIMES DAILY
Qty: 90 CAPSULE | Refills: 3 | Status: SHIPPED | OUTPATIENT
Start: 2021-08-31 | End: 2021-11-23 | Stop reason: SDUPTHER

## 2021-08-31 NOTE — TELEPHONE ENCOUNTER
PCP: Joellen Beth MD    Last appt: 3/30/2021  Future Appointments   Date Time Provider Ravindra Manuel   9/28/2021 11:30 AM Joellen Beth MD [facility] BS AMB       Requested Prescriptions     Pending Prescriptions Disp Refills    gabapentin (NEURONTIN) 100 mg capsule 90 Capsule 3     Sig: Take 1 Capsule by mouth three (3) times daily. Max Daily Amount: 300 mg.    amLODIPine-benazepril (LOTREL) 10-20 mg per capsule 90 Capsule 0     Sig: TAKE 1 CAPSULE EVERY DAY    chlorthalidone (HYGROTON) 25 mg tablet 90 Tablet 0     Sig: Take 1 Tablet by mouth daily.

## 2021-09-27 NOTE — PROGRESS NOTES
HISTORY OF PRESENT ILLNESS  Dino Luevano is a 66 y.o. female. HPI     Last here 3/30/21. Pt is here to f/u on chronic conditions. Discussed that she is eligible for Pfizer covid booster       Takes gabapentin for tingling in fingers-- this is helping   Reviewed xr R hand 21:  No acute abnormality. BP today is 106/60  BP at home 103/68 127/66 124/68  Continues lotrel 10-20mg and chlorthalidone 25mg daily     Wt was 131 lbs lov  Her wt is close to nl range      Reviewed labs      Saw Dr. Wallene Duane (cardio) for her palpitations in the past  Last visit was 10/3/17  Her sx have since resolved   Denies palpitations      Follows with Dr. Princess Sullivan (neuro) for numbness and tingling  Last visit 3/15/21   Pt follows with Dr. Phong Tomlin (derm)    Last visit was    Pt states she only f/u if she has a problem      Pt saw Dr Rizwan Redd (vascular) for blood flow in legs   No swelling in legs currently   Last visit 3/19       Continues vit D OTC 2000U daily   Fosamax was started in , but pt stopped taking this after trying twice  She stated that she \"feels good without it\"  Declines further eval /tx    Reviewed mammogram negative      ACP not on file. SDMs are [employer]  () or her daughter.    Provided information in the past      PREVENTIVE:    Colonoscopy: 18 at [facility]  per pt 5 year repeat  Pap:  20 sandra no, will only f/u if has an issue   Mammogram: [facility]  negative  DEXA: 18, osteoporosis fosamax failed--declines further tx or eval   Tdap: 2/10/2016  Pneumovax: 2014, will update today 21  Fiurifl18: 18  Shingrix: declines  Flu shot:  20, will get today 21  A1c:  nl, 3/17 5.4  5.1  Eye exam: [facility] ([phone]),    Lipids: 3/21   EK17, new T inversions in the anterior leads  Hep C screen: , negative  Covid: both (pfizer)    Patient Active Problem List    Diagnosis Date Noted    Osteopenia 03/22/2017    Essential hypertension 04/06/2016    Symptomatic menopausal or female climacteric states 10/25/2013    Bursitis of shoulder, right 01/05/2012    Vitiligo 05/13/2010     Current Outpatient Medications   Medication Sig Dispense Refill    gabapentin (NEURONTIN) 100 mg capsule Take 1 Capsule by mouth three (3) times daily. Max Daily Amount: 300 mg. 90 Capsule 3    amLODIPine-benazepril (LOTREL) 10-20 mg per capsule TAKE 1 CAPSULE EVERY DAY 90 Capsule 0    chlorthalidone (HYGROTON) 25 mg tablet Take 1 Tablet by mouth daily. 90 Tablet 0    cholecalciferol, vitamin D3, (VITAMIN D3) 2,000 unit tab Take  by mouth.  MULTIVITAMIN PO Take  by mouth. Past Surgical History:   Procedure Laterality Date    ENDOSCOPY, COLON, DIAGNOSTIC  2003    hyperplastic polyps; 10 year repeat; Dr. Sophia Palomo    FOOT/TOES SURGERY [address] [facility]      Bone spurs removed R & L foot     HX HEENT  03/2013    cataract removal    [address] [facility]    HX TUBAL LIGATION        Lab Results   Component Value Date/Time    WBC 4.7 09/30/2020 02:45 PM    HGB 12.7 09/30/2020 02:45 PM    HCT 38.2 09/30/2020 02:45 PM    PLATELET 308 17/55/8130 02:45 PM    MCV 91 09/30/2020 02:45 PM     Lab Results   Component Value Date/Time    Cholesterol, total 172 03/30/2021 01:10 PM    HDL Cholesterol 60 03/30/2021 01:10 PM    LDL, calculated 101.8 (H) 03/30/2021 01:10 PM    Triglyceride 51 03/30/2021 01:10 PM    CHOL/HDL Ratio 2.9 03/30/2021 01:10 PM     Lab Results   Component Value Date/Time    GFR est non-AA >60 03/30/2021 01:10 PM    GFR est AA >60 03/30/2021 01:10 PM    Creatinine 0.80 03/30/2021 01:10 PM    BUN 24 (H) 03/30/2021 01:10 PM    Sodium 141 03/30/2021 01:10 PM    Potassium 3.5 03/30/2021 01:10 PM    Chloride 104 03/30/2021 01:10 PM    CO2 30 03/30/2021 01:10 PM        Review of Systems   Respiratory: Negative for shortness of breath. Cardiovascular: Negative for chest pain.        Physical Exam  Constitutional:       General: She is not in acute distress. Appearance: Normal appearance. She is not ill-appearing, toxic-appearing or diaphoretic. HENT:      Head: Normocephalic and atraumatic. Right Ear: External ear normal.      Left Ear: External ear normal.   Eyes:      General:         Right eye: No discharge. Left eye: No discharge. Conjunctiva/sclera: Conjunctivae normal.      Pupils: Pupils are equal, round, and reactive to light. Cardiovascular:      Rate and Rhythm: Normal rate and regular rhythm. Heart sounds: No murmur heard. No friction rub. No gallop. Pulmonary:      Effort: No respiratory distress. Breath sounds: Normal breath sounds. No wheezing or rales. Chest:      Chest wall: No tenderness. Musculoskeletal:         General: Normal range of motion. Cervical back: Normal range of motion and neck supple. Right lower leg: No edema. Left lower leg: No edema. Skin:     General: Skin is warm and dry. Neurological:      Mental Status: She is alert and oriented to person, place, and time. Mental status is at baseline. Coordination: Coordination normal.      Gait: Gait normal.   Psychiatric:         Mood and Affect: Mood normal.         Behavior: Behavior normal.         ASSESSMENT and PLAN    ICD-10-CM ICD-9-CM    1. Essential hypertension      I10 401.9 LIPID PANEL      METABOLIC PANEL, COMPREHENSIVE   Blood pressure controlled on Lotrel and chlorthalidone   CBC W/O DIFF      TSH 3RD GENERATION      HEMOGLOBIN A1C WITH EAG   2. Osteopenia, unspecified location  M85.80 733.90 LIPID PANEL      METABOLIC PANEL, COMPREHENSIVE   Vitamin D for treatment bone density is not up-to-date but she declines further treatment had Fosamax in the past not interested in additional evaluation   CBC W/O DIFF      TSH 3RD GENERATION      HEMOGLOBIN A1C WITH EAG   3.  IFG (impaired fasting glucose)  R73.01 790.21 LIPID PANEL      METABOLIC PANEL, COMPREHENSIVE   Check A1c diet controlled   CBC W/O DIFF      TSH 3RD GENERATION      HEMOGLOBIN A1C WITH EAG   4. Neuropathy  G62.9 355.9 LIPID PANEL   Has gabapentin to use as needed for tingling in her fingers does not use all the time   METABOLIC PANEL, COMPREHENSIVE      CBC W/O DIFF      TSH 3RD GENERATION      HEMOGLOBIN A1C WITH EAG   5. Dyslipidemia  E78.5 272.4 LIPID PANEL      METABOLIC PANEL, COMPREHENSIVE   Diet controlled   CBC W/O DIFF      TSH 3RD GENERATION      HEMOGLOBIN A1C WITH EAG        Scribed by Liv Fabian of [facility], as dictated by Dr. Beaulah Simmonds. Current diagnosis and concerns discussed with pt at length. Pt understands risks and benefits or current treatment plan and medications, and accepts the treatment and medication with any possible risks. Pt asks appropriate questions, which were answered. Pt was instructed to call with any concerns or problems. I have reviewed the note documented by the scribe. The services provided are my own.   The documentation is accurate

## 2021-09-28 ENCOUNTER — OFFICE VISIT (OUTPATIENT)
Dept: INTERNAL MEDICINE CLINIC | Age: 71
End: 2021-09-28
Payer: MEDICARE

## 2021-09-28 VITALS
HEIGHT: 60 IN | RESPIRATION RATE: 16 BRPM | HEART RATE: 73 BPM | BODY MASS INDEX: 26.27 KG/M2 | TEMPERATURE: 98.2 F | SYSTOLIC BLOOD PRESSURE: 106 MMHG | WEIGHT: 133.8 LBS | OXYGEN SATURATION: 97 % | DIASTOLIC BLOOD PRESSURE: 60 MMHG

## 2021-09-28 DIAGNOSIS — M85.80 OSTEOPENIA, UNSPECIFIED LOCATION: ICD-10-CM

## 2021-09-28 DIAGNOSIS — Z23 ENCOUNTER FOR IMMUNIZATION: ICD-10-CM

## 2021-09-28 DIAGNOSIS — I10 ESSENTIAL HYPERTENSION: Primary | ICD-10-CM

## 2021-09-28 DIAGNOSIS — Z23 NEEDS FLU SHOT: ICD-10-CM

## 2021-09-28 DIAGNOSIS — G62.9 NEUROPATHY: ICD-10-CM

## 2021-09-28 DIAGNOSIS — R73.01 IFG (IMPAIRED FASTING GLUCOSE): ICD-10-CM

## 2021-09-28 DIAGNOSIS — E78.5 DYSLIPIDEMIA: ICD-10-CM

## 2021-09-28 PROCEDURE — G8754 DIAS BP LESS 90: HCPCS | Performed by: INTERNAL MEDICINE

## 2021-09-28 PROCEDURE — 90694 VACC AIIV4 NO PRSRV 0.5ML IM: CPT | Performed by: INTERNAL MEDICINE

## 2021-09-28 PROCEDURE — 90732 PPSV23 VACC 2 YRS+ SUBQ/IM: CPT | Performed by: INTERNAL MEDICINE

## 2021-09-28 PROCEDURE — 1090F PRES/ABSN URINE INCON ASSESS: CPT | Performed by: INTERNAL MEDICINE

## 2021-09-28 PROCEDURE — G0008 ADMIN INFLUENZA VIRUS VAC: HCPCS | Performed by: INTERNAL MEDICINE

## 2021-09-28 PROCEDURE — G8399 PT W/DXA RESULTS DOCUMENT: HCPCS | Performed by: INTERNAL MEDICINE

## 2021-09-28 PROCEDURE — 3017F COLORECTAL CA SCREEN DOC REV: CPT | Performed by: INTERNAL MEDICINE

## 2021-09-28 PROCEDURE — G0009 ADMIN PNEUMOCOCCAL VACCINE: HCPCS | Performed by: INTERNAL MEDICINE

## 2021-09-28 PROCEDURE — G8510 SCR DEP NEG, NO PLAN REQD: HCPCS | Performed by: INTERNAL MEDICINE

## 2021-09-28 PROCEDURE — G9899 SCRN MAM PERF RSLTS DOC: HCPCS | Performed by: INTERNAL MEDICINE

## 2021-09-28 PROCEDURE — G8419 CALC BMI OUT NRM PARAM NOF/U: HCPCS | Performed by: INTERNAL MEDICINE

## 2021-09-28 PROCEDURE — 1101F PT FALLS ASSESS-DOCD LE1/YR: CPT | Performed by: INTERNAL MEDICINE

## 2021-09-28 PROCEDURE — G8752 SYS BP LESS 140: HCPCS | Performed by: INTERNAL MEDICINE

## 2021-09-28 PROCEDURE — G8536 NO DOC ELDER MAL SCRN: HCPCS | Performed by: INTERNAL MEDICINE

## 2021-09-28 PROCEDURE — 99213 OFFICE O/P EST LOW 20 MIN: CPT | Performed by: INTERNAL MEDICINE

## 2021-09-28 PROCEDURE — G8427 DOCREV CUR MEDS BY ELIG CLIN: HCPCS | Performed by: INTERNAL MEDICINE

## 2021-09-29 LAB
ALBUMIN SERPL-MCNC: 3.6 G/DL (ref 3.5–5)
ALBUMIN/GLOB SERPL: 1 {RATIO} (ref 1.1–2.2)
ALP SERPL-CCNC: 83 U/L (ref 45–117)
ALT SERPL-CCNC: 22 U/L (ref 12–78)
ANION GAP SERPL CALC-SCNC: 3 MMOL/L (ref 5–15)
AST SERPL-CCNC: 18 U/L (ref 15–37)
BILIRUB SERPL-MCNC: 0.5 MG/DL (ref 0.2–1)
BUN SERPL-MCNC: 22 MG/DL (ref 6–20)
BUN/CREAT SERPL: 21 (ref 12–20)
CALCIUM SERPL-MCNC: 9.7 MG/DL (ref 8.5–10.1)
CHLORIDE SERPL-SCNC: 102 MMOL/L (ref 97–108)
CHOLEST SERPL-MCNC: 176 MG/DL
CO2 SERPL-SCNC: 32 MMOL/L (ref 21–32)
CREAT SERPL-MCNC: 1.05 MG/DL (ref 0.55–1.02)
ERYTHROCYTE [DISTWIDTH] IN BLOOD BY AUTOMATED COUNT: 12.4 % (ref 11.5–14.5)
EST. AVERAGE GLUCOSE BLD GHB EST-MCNC: 105 MG/DL
GLOBULIN SER CALC-MCNC: 3.6 G/DL (ref 2–4)
GLUCOSE SERPL-MCNC: 80 MG/DL (ref 65–100)
HBA1C MFR BLD: 5.3 % (ref 4–5.6)
HCT VFR BLD AUTO: 42.8 % (ref 35–47)
HDLC SERPL-MCNC: 54 MG/DL
HDLC SERPL: 3.3 {RATIO} (ref 0–5)
HGB BLD-MCNC: 13.4 G/DL (ref 11.5–16)
LDLC SERPL CALC-MCNC: 109.6 MG/DL (ref 0–100)
MCH RBC QN AUTO: 30.4 PG (ref 26–34)
MCHC RBC AUTO-ENTMCNC: 31.3 G/DL (ref 30–36.5)
MCV RBC AUTO: 97.1 FL (ref 80–99)
NRBC # BLD: 0.02 K/UL (ref 0–0.01)
NRBC BLD-RTO: 0.5 PER 100 WBC
PLATELET # BLD AUTO: 254 K/UL (ref 150–400)
PMV BLD AUTO: 13 FL (ref 8.9–12.9)
POTASSIUM SERPL-SCNC: 4.1 MMOL/L (ref 3.5–5.1)
PROT SERPL-MCNC: 7.2 G/DL (ref 6.4–8.2)
RBC # BLD AUTO: 4.41 M/UL (ref 3.8–5.2)
SODIUM SERPL-SCNC: 137 MMOL/L (ref 136–145)
TRIGL SERPL-MCNC: 62 MG/DL (ref ?–150)
TSH SERPL DL<=0.05 MIU/L-ACNC: 1.58 UIU/ML (ref 0.36–3.74)
VLDLC SERPL CALC-MCNC: 12.4 MG/DL
WBC # BLD AUTO: 4.2 K/UL (ref 3.6–11)

## 2021-11-23 DIAGNOSIS — R20.2 NUMBNESS AND TINGLING: ICD-10-CM

## 2021-11-23 DIAGNOSIS — R20.0 NUMBNESS AND TINGLING: ICD-10-CM

## 2021-11-23 RX ORDER — GABAPENTIN 100 MG/1
100 CAPSULE ORAL 3 TIMES DAILY
Qty: 90 CAPSULE | Refills: 3 | Status: SHIPPED | OUTPATIENT
Start: 2021-11-23 | End: 2022-02-04

## 2021-11-23 NOTE — TELEPHONE ENCOUNTER
Future Appointments:  Future Appointments   Date Time Provider Ravindra Manuel   3/29/2022 11:30 AM Elaine Carver MD [facility] BS AMB        Last Appointment With Me:  9/28/2021     Requested Prescriptions     Pending Prescriptions Disp Refills    gabapentin (NEURONTIN) 100 mg capsule 90 Capsule 3     Sig: Take 1 Capsule by mouth three (3) times daily. Max Daily Amount: 300 mg.

## 2021-11-29 NOTE — PROGRESS NOTES
HISTORY OF PRESENT ILLNESS  Yisel Ayoub is a 66 y.o. female. HPI     Last here 9/28/21. Pt is here to f/u on chronic conditions. She c/o R hip pain and R lateral thigh pain when doing yard work or lying down x 1 month  Will get xr lower back and R hip  Will order steroid    She c/o soreness calf muscle bilaterally           BP today is 123/71  No home readings to review  Continues lotrel 10-20mg and chlorthalidone 25mg daily     Wt today is 131 lbs--stable x lov  Her wt is close to nl range      Reviewed labs      Saw Dr. Leti Thomson (cardio) for her palpitations in the past  Last visit was 10/3/17  Her sx have since resolved   Denies palpitations 11/21       She also c/o R arm/hand pain  She reports some numbness as well this is a recurrent problem we have did a plain film last time unrevealing    Follows with Dr. Denisa Reyez (neuro) for numbness and tingling she saw him several times had an EMG showed no neuropathy  Last visit 3/15/21   She later went to hand surgeon Dr. Shweta Ayoub  Discussed f/u with Dr. Shweta Ayoub about this  Has gabapentin which helps a little bit  She had EMG 11/20: did not show neuropathy in hand  Takes gabapentin TID for tingling in fingers-- this is helping a little bit but still has some symptoms see above 11/21         Pt follows with Dr. Jennifer Zimmerman (derm)    Last visit was 4/17   Pt states she only f/u if she has a problem        Pt saw Dr Naeem Graves (vascular) for blood flow in legs   No swelling in legs currently   Last visit 3/19       Continues vit D OTC 2000U daily   Fosamax was started in 4/18, but pt stopped taking this after trying twice  She stated that she \"feels good without it\"  Declines further eval /tx           ACP not on file. SDMs are [employer]  () or her daughter.    Provided information in the past.      PREVENTIVE:    Colonoscopy: 5/11/18 at [facility]  per pt 5 year repeat  Pap: 5/19 21 sandra no, will only f/u if has an issue   Mammogram: [facility] 5/21 negative  DEXA: 18, osteoporosis fosamax failed--declines further tx or eval   Tdap: 2/10/2016  Pneumovax: 21  Ihfevjc89: 18  Shingrix: declines  Flu shot:  21  A1c:  nl, 3/17 5.4  5.1  5.3  [facility] exam: [facility] ([phone]),    Lipids:    EK17, new T inversions in the anterior leads  Hep C screen: , negative  Covid: three doses [employer])       Patient Active Problem List    Diagnosis Date Noted    Osteopenia 2017    Essential hypertension 2016    Symptomatic menopausal or female climacteric states 10/25/2013    Bursitis of shoulder, right 2012    Vitiligo 2010     Current Outpatient Medications   Medication Sig Dispense Refill    amLODIPine-benazepril (LOTREL) 10-20 mg per capsule TAKE 1 CAPSULE EVERY DAY 90 Capsule 1    chlorthalidone (HYGROTON) 25 mg tablet Take 1 Tablet by mouth daily. 90 Tablet 1    methylPREDNISolone (MEDROL DOSEPACK) 4 mg tablet Per pack 1 Dose Pack 0    gabapentin (NEURONTIN) 100 mg capsule Take 1 Capsule by mouth three (3) times daily. Max Daily Amount: 300 mg. 90 Capsule 3    cholecalciferol, vitamin D3, (VITAMIN D3) 2,000 unit tab Take  by mouth.  MULTIVITAMIN PO Take  by mouth.        Past Surgical History:   Procedure Laterality Date    ENDOSCOPY, COLON, DIAGNOSTIC      hyperplastic polyps; 10 year repeat; Dr. Claudeen Craven    FOOT/TOES SURGERY [address] [facility]      Bone spurs removed R & L foot     HX HEENT  2013    cataract removal    [address] [facility] [place]    HX TUBAL LIGATION        Lab Results   Component Value Date/Time    WBC 4.2 2021 12:14 PM    HGB 13.4 2021 12:14 PM    HCT 42.8 2021 12:14 PM    PLATELET 894  12:14 PM    MCV 97.1 2021 12:14 PM     Lab Results   Component Value Date/Time    Cholesterol, total 176 2021 12:14 PM    HDL Cholesterol 54 2021 12:14 PM    LDL, calculated 109.6 (H) 2021 12:14 PM    Triglyceride 62 09/28/2021 12:14 PM    CHOL/HDL Ratio 3.3 09/28/2021 12:14 PM     Lab Results   Component Value Date/Time    GFR est non-AA 52 (L) 09/28/2021 12:14 PM    GFR est AA >60 09/28/2021 12:14 PM    Creatinine 1.05 (H) 09/28/2021 12:14 PM    BUN 22 (H) 09/28/2021 12:14 PM    Sodium 137 09/28/2021 12:14 PM    Potassium 4.1 09/28/2021 12:14 PM    Chloride 102 09/28/2021 12:14 PM    CO2 32 09/28/2021 12:14 PM        Review of Systems   Respiratory: Negative for shortness of breath. Cardiovascular: Negative for chest pain. Musculoskeletal: Positive for joint pain. R hip pain  Calf muscle pain bilaterally  R arm/hand pain       Physical Exam  Constitutional:       General: She is not in acute distress. Appearance: Normal appearance. She is not ill-appearing, toxic-appearing or diaphoretic. HENT:      Head: Normocephalic and atraumatic. Right Ear: External ear normal.      Left Ear: External ear normal.   Eyes:      General:         Right eye: No discharge. Left eye: No discharge. Conjunctiva/sclera: Conjunctivae normal.      Pupils: Pupils are equal, round, and reactive to light. Cardiovascular:      Rate and Rhythm: Normal rate and regular rhythm. Heart sounds: No murmur heard. No friction rub. No gallop. Pulmonary:      Effort: No respiratory distress. Breath sounds: Normal breath sounds. No wheezing or rales. Chest:      Chest wall: No tenderness. Abdominal:      General: Abdomen is flat. There is no distension. Palpations: Abdomen is soft. There is no mass. Tenderness: There is no abdominal tenderness. There is no guarding or rebound. Hernia: No hernia is present. Musculoskeletal:         General: Normal range of motion. Cervical back: Normal range of motion and neck supple. Comments: Normal hand    Skin:     General: Skin is warm and dry.    Neurological:      Mental Status: She is alert and oriented to person, place, and time. Mental status is at baseline. Coordination: Coordination normal.      Gait: Gait normal.   Psychiatric:         Mood and Affect: Mood normal.         Behavior: Behavior normal.         ASSESSMENT and PLAN    ICD-10-CM ICD-9-CM    1. Hip pain, right      M25.551 719.45 XR SPINE LUMB 2 OR 3 V   Check plain film of the hip and L-spine fine may be sciatic    Continue gabapentin we will give Medrol Dosepak   XR HIP RT W OR WO PELV 2-3 VWS   2. Essential hypertension  I10 401.9 amLODIPine-benazepril (LOTREL) 10-20 mg per capsule   Controlled Lotrel and chlorthalidone continue   chlorthalidone (HYGROTON) 25 mg tablet   3. Hand pain, right       Had EMG already no significant neuropathy will follow up with hand surgeon M79.373 729.5 REFERRAL TO ORTHOPEDICS   4. Sciatica, right side  M54.31 724.3 XR SPINE LUMB 2 OR 3 V   See above Medrol Dosepak   XR HIP RT W OR WO PELV 2-3 VWS        Depression screen reviewed and positive (2), declines meds overall doing okay      Scribed by Pastora Arreola of [facility], as dictated by Dr. Marylene Pavlov. Current diagnosis and concerns discussed with pt at length. Pt understands risks and benefits or current treatment plan and medications, and accepts the treatment and medication with any possible risks. Pt asks appropriate questions, which were answered. Pt was instructed to call with any concerns or problems. I have reviewed the note documented by the scribe. The services provided are my own.   The documentation is accurate

## 2021-11-30 ENCOUNTER — OFFICE VISIT (OUTPATIENT)
Dept: INTERNAL MEDICINE CLINIC | Age: 71
End: 2021-11-30

## 2021-11-30 VITALS
HEART RATE: 89 BPM | SYSTOLIC BLOOD PRESSURE: 123 MMHG | WEIGHT: 131 LBS | HEIGHT: 60 IN | DIASTOLIC BLOOD PRESSURE: 71 MMHG | OXYGEN SATURATION: 97 % | BODY MASS INDEX: 25.72 KG/M2 | RESPIRATION RATE: 16 BRPM | TEMPERATURE: 97.6 F

## 2021-11-30 DIAGNOSIS — I10 ESSENTIAL HYPERTENSION: ICD-10-CM

## 2021-11-30 DIAGNOSIS — M25.551 HIP PAIN, RIGHT: Primary | ICD-10-CM

## 2021-11-30 DIAGNOSIS — M54.31 SCIATICA, RIGHT SIDE: ICD-10-CM

## 2021-11-30 DIAGNOSIS — M79.641 HAND PAIN, RIGHT: ICD-10-CM

## 2021-11-30 PROCEDURE — G8399 PT W/DXA RESULTS DOCUMENT: HCPCS | Performed by: INTERNAL MEDICINE

## 2021-11-30 PROCEDURE — G8427 DOCREV CUR MEDS BY ELIG CLIN: HCPCS | Performed by: INTERNAL MEDICINE

## 2021-11-30 PROCEDURE — 1090F PRES/ABSN URINE INCON ASSESS: CPT | Performed by: INTERNAL MEDICINE

## 2021-11-30 PROCEDURE — G8510 SCR DEP NEG, NO PLAN REQD: HCPCS | Performed by: INTERNAL MEDICINE

## 2021-11-30 PROCEDURE — G8752 SYS BP LESS 140: HCPCS | Performed by: INTERNAL MEDICINE

## 2021-11-30 PROCEDURE — G9899 SCRN MAM PERF RSLTS DOC: HCPCS | Performed by: INTERNAL MEDICINE

## 2021-11-30 PROCEDURE — G8754 DIAS BP LESS 90: HCPCS | Performed by: INTERNAL MEDICINE

## 2021-11-30 PROCEDURE — 3017F COLORECTAL CA SCREEN DOC REV: CPT | Performed by: INTERNAL MEDICINE

## 2021-11-30 PROCEDURE — G8536 NO DOC ELDER MAL SCRN: HCPCS | Performed by: INTERNAL MEDICINE

## 2021-11-30 PROCEDURE — 1101F PT FALLS ASSESS-DOCD LE1/YR: CPT | Performed by: INTERNAL MEDICINE

## 2021-11-30 PROCEDURE — 99213 OFFICE O/P EST LOW 20 MIN: CPT | Performed by: INTERNAL MEDICINE

## 2021-11-30 PROCEDURE — G8419 CALC BMI OUT NRM PARAM NOF/U: HCPCS | Performed by: INTERNAL MEDICINE

## 2021-11-30 RX ORDER — AMLODIPINE AND BENAZEPRIL HYDROCHLORIDE 10; 20 MG/1; MG/1
CAPSULE ORAL
Qty: 90 CAPSULE | Refills: 1 | Status: SHIPPED | OUTPATIENT
Start: 2021-11-30 | End: 2022-05-11 | Stop reason: SDUPTHER

## 2021-11-30 RX ORDER — METHYLPREDNISOLONE 4 MG/1
TABLET ORAL
Qty: 1 DOSE PACK | Refills: 0 | Status: SHIPPED | OUTPATIENT
Start: 2021-11-30 | End: 2022-02-04 | Stop reason: SDUPTHER

## 2021-11-30 RX ORDER — CHLORTHALIDONE 25 MG/1
25 TABLET ORAL DAILY
Qty: 90 TABLET | Refills: 1 | Status: SHIPPED | OUTPATIENT
Start: 2021-11-30 | End: 2022-05-11 | Stop reason: SDUPTHER

## 2021-12-13 ENCOUNTER — TELEPHONE (OUTPATIENT)
Dept: INTERNAL MEDICINE CLINIC | Age: 71
End: 2021-12-13

## 2021-12-13 DIAGNOSIS — Z13.820 SCREENING FOR OSTEOPOROSIS: Primary | ICD-10-CM

## 2021-12-13 DIAGNOSIS — Z78.0 ENCOUNTER FOR OSTEOPOROSIS SCREENING IN ASYMPTOMATIC POSTMENOPAUSAL PATIENT: ICD-10-CM

## 2021-12-13 DIAGNOSIS — Z13.820 ENCOUNTER FOR OSTEOPOROSIS SCREENING IN ASYMPTOMATIC POSTMENOPAUSAL PATIENT: ICD-10-CM

## 2021-12-13 NOTE — TELEPHONE ENCOUNTER
States that she went to see dr Xenia Nowak. States specialists recommends a bone density test but needs dr Garrett Bang to order    Can this be ordered.     [phone]  (states detailed message ok per patient)bone

## 2021-12-23 ENCOUNTER — HOSPITAL ENCOUNTER (OUTPATIENT)
Dept: MAMMOGRAPHY | Age: 71
Discharge: HOME | End: 2021-12-23
Attending: INTERNAL MEDICINE
Payer: MEDICARE

## 2021-12-23 DIAGNOSIS — Z78.0 ENCOUNTER FOR OSTEOPOROSIS SCREENING IN ASYMPTOMATIC POSTMENOPAUSAL PATIENT: ICD-10-CM

## 2021-12-23 DIAGNOSIS — Z13.820 ENCOUNTER FOR OSTEOPOROSIS SCREENING IN ASYMPTOMATIC POSTMENOPAUSAL PATIENT: ICD-10-CM

## 2021-12-23 PROCEDURE — 77080 DXA BONE DENSITY AXIAL: CPT

## 2022-02-02 ENCOUNTER — NURSE TRIAGE (OUTPATIENT)
Dept: OTHER | Facility: CLINIC | Age: 72
End: 2022-02-02

## 2022-02-02 ENCOUNTER — TELEPHONE (OUTPATIENT)
Dept: INTERNAL MEDICINE CLINIC | Age: 72
End: 2022-02-02

## 2022-02-02 NOTE — PROGRESS NOTES
HISTORY OF PRESENT ILLNESS  Cheyenne Martinez is a 66 y.o. female. HPI     Last here 11/30/21. Pt is here to f/u on chronic conditions. This is an established visit completed with telemedicine was completed  15 minutes  the patient acknowledges and agrees to this method of visitation doxyme    She c/o numbness and tightness in hand x several months  Discussed that this might be carpal tunnel  Previously gave her steroid, this helped temporarily  She has also been taking gabapentin four times per day  Will change to 300mg BID of gabapentin  She also c/o burning  pain both thighs x few months  She reports that this bothers her more when standing as opposed to sitting  Has numbness in toes as well  Previously got xr lower back and R hip which was clear  Reviewed xr R hip 11/30/21:  No acute abnormality. Reviewed xr L spine 11/30/21:  DJD L5-S1.   Will provide referral for neuro       BP today is 120s/70s  Continues lotrel 10-20mg and chlorthalidone 25mg daily     Wt was 131 lbs lov  Her wt is close to nl range      Reviewed labs     Saw Dr. Dwight Cordon (cardio) for her palpitations in the past  Last visit was 10/3/17  Her sx have since resolved   Denies palpitations 2/22     Follows with Dr. Bee Tay (neuro) for numbness and tingling she saw him several times had an EMG showed no neuropathy  Last visit 3/15/21   She later went to hand surgeon Dr. Brody Garcia  Discussed f/u with Dr. Brody Garcia about this  Has gabapentin which helps a little bit  She had EMG 11/20: did not show neuropathy in hand  Takes gabapentin TID for tingling in fingers-- this is helping a little bit but still has some symptoms see above 11/21         Pt follows with Dr. Buckley (derm)    Last visit was 4/17   Pt states she only f/u if she has a problem         Pt saw Dr Dyllan Che (vascular) for blood flow in legs   No swelling in legs currently   Last visit 3/19       Continues vit D OTC 2000U daily   Reviewed DEXA 12/23/21: osteopenia  Fosamax was started in , but pt stopped taking this after trying twice  She stated that she \"feels good without it\"  Declines further eval /tx         ACP not on file. SDMs are [employer]  () or her daughter. Provided information in the past.      PREVENTIVE:    Colonoscopy: 18 at [facility]  per pt 5 year repeat  Pap: 20 sandra no, will only f/u if has an issue   Mammogram: [facility]  negative  DEXA:  21 osteopenia, fosamax failed--declines further tx or eval   Tdap: 2/10/2016  Pneumovax: 21  Vzzlcba98: 18  Shingrix: declines  Flu shot:  21  A1c:  nl, 3/17 5.4  5.1  5.3  Eye exam: [facility] ([phone]),    Lipids:    EK17, new T inversions in the anterior leads  Hep C screen: , negative  Covid: three doses (Pfizer)    Patient Active Problem List    Diagnosis Date Noted    Osteopenia 2017    Essential hypertension 2016    Symptomatic menopausal or female climacteric states 10/25/2013    Bursitis of shoulder, right 2012    Vitiligo 2010     Current Outpatient Medications   Medication Sig Dispense Refill    amLODIPine-benazepril (LOTREL) 10-20 mg per capsule TAKE 1 CAPSULE EVERY DAY 90 Capsule 1    chlorthalidone (HYGROTON) 25 mg tablet Take 1 Tablet by mouth daily. 90 Tablet 1    methylPREDNISolone (MEDROL DOSEPACK) 4 mg tablet Per pack 1 Dose Pack 0    cholecalciferol, vitamin D3, (VITAMIN D3) 2,000 unit tab Take  by mouth.  MULTIVITAMIN PO Take  by mouth.        Past Surgical History:   Procedure Laterality Date    ENDOSCOPY, COLON, DIAGNOSTIC      hyperplastic polyps; 10 year repeat; Dr. Asuncion Haro    FOOT/TOES SURGERY [facility]      Bone spurs removed R & L foot     HX HEENT  2013    cataract removal    [facility]    HX TUBAL LIGATION        Lab Results   Component Value Date/Time    WBC 4.2 2021 12:14 PM    HGB 13.4 2021 12:14 PM HCT 42.8 09/28/2021 12:14 PM    PLATELET 951 49/71/4324 12:14 PM    MCV 97.1 09/28/2021 12:14 PM     Lab Results   Component Value Date/Time    Cholesterol, total 176 09/28/2021 12:14 PM    HDL Cholesterol 54 09/28/2021 12:14 PM    LDL, calculated 109.6 (H) 09/28/2021 12:14 PM    Triglyceride 62 09/28/2021 12:14 PM    CHOL/HDL Ratio 3.3 09/28/2021 12:14 PM     Lab Results   Component Value Date/Time    GFR est non-AA 52 (L) 09/28/2021 12:14 PM    GFR est AA >60 09/28/2021 12:14 PM    Creatinine 1.05 (H) 09/28/2021 12:14 PM    BUN 22 (H) 09/28/2021 12:14 PM    Sodium 137 09/28/2021 12:14 PM    Potassium 4.1 09/28/2021 12:14 PM    Chloride 102 09/28/2021 12:14 PM    CO2 32 09/28/2021 12:14 PM        Review of Systems   Respiratory: Negative for shortness of breath. Cardiovascular: Negative for chest pain. Musculoskeletal:        Numbness and tightness hands    Burning pain in legs       Physical Exam    ASSESSMENT and PLAN    ICD-10-CM ICD-9-CM    1. Carpal tunnel syndrome of right wrist     Has been symptoms concerning for carpal tunnel previously saw Ortho and he will need to schedule follow-up can wear wrist splints    We will give Medrol Dosepak this will help with her symptoms    Have also upped her gabapentin dose G56.01 354.0    2. Bilateral sciatica  M54.31 724.3    L-spine plain film is unremarkable minimal generative changes    Medrol Dosepak will help upping gabapentin to 300 mg twice daily M54.32     3. Essential hypertension       Has been well controlled on Lotrel continue to change dose I10 401.9    4. Numbness and tingling  R20.0 782.0    We will set her up with neurology as she has multiple different neuropathy type symptoms in her feet her thighs her hands for repeat evaluation she was last there 2015 R20.2        Depression screen reviewed and negative     Scribed by Sandy Cummings of [facility], as dictated by Dr. Noel Bernard. Current diagnosis and concerns discussed with pt at length. Pt understands risks and benefits or current treatment plan and medications, and accepts the treatment and medication with any possible risks. Pt asks appropriate questions, which were answered. Pt was instructed to call with any concerns or problems. I have reviewed the note documented by the scribe. The services provided are my own. The documentation is accurate     Bethany Tran, was evaluated through a synchronous (real-time) audio-video encounter. The patient (or guardian if applicable) is aware that this is a billable service. Verbal consent to proceed has been obtained. The visit was conducted pursuant to the emergency declaration under the [address] [place] [address], [address] [place] [address] authority and the [employer] General Act. Patient identification was verified, and a caregiver was present when appropriate. The patient was located at home in a state where the provider was licensed to provide care.

## 2022-02-02 NOTE — TELEPHONE ENCOUNTER
Received call from Bin Hall at [facility] with Red Flag Complaint. Subjective: Caller states \"On the bottom of my feet that's been numb for awhile. Now my fingers started. Seems like right hand is going numb and left is tingling. \"     Current Symptoms: Numbness and tingling to bilateral feet and hands     Onset: a few years ago; gradual    Associated Symptoms: NA    Pain Severity: Bilateral thigh pain 8-9/10 when hits her, more right than left- none at time of call     Temperature: Denies fever/feeling feverish     What has been tried: Gabapentin     LMP: NA Pregnant: NA    Recommended disposition: See in office within 3 days. Advised caller if unable to get an appointment in the suggested time frame to go to an [facility] or walk-in clinic, caller agreeable. Care advice provided, patient verbalizes understanding; denies any other questions or concerns; instructed to call back for any new or worsening symptoms. Writer provided warm transfer to Zachery Sharma at [facility] for appointment scheduling. Attention Provider: Thank you for allowing me to participate in the care of your patient. The patient was connected to triage in response to information provided to the [facility]. Please do not respond through this encounter as the response is not directed to a shared pool.     Reason for Disposition   Numbness or tingling in one or both hands is a chronic symptom (recurrent or ongoing problem lasting > 4 weeks)    Protocols used: NEUROLOGIC DEFICIT-ADULT-[place]

## 2022-02-02 NOTE — TELEPHONE ENCOUNTER
Pt transferred from nurse triage to [facility] ([facility]) to this psr for scheduling. Pt states to this psr that she has tingling and numbness in hands and fingers. Pt clearly states onset is years and \"quite a while\". Pt states condition has been ongoing and worsening gradually, and has been discussed with pcp on previous occasions. Pt was scheduled for first available appt for this psr, a VV on 2/4 @ 11:45am.    Please call pt if anything further.

## 2022-02-04 ENCOUNTER — VIRTUAL VISIT (OUTPATIENT)
Dept: INTERNAL MEDICINE CLINIC | Age: 72
End: 2022-02-04
Payer: MEDICARE

## 2022-02-04 ENCOUNTER — TELEPHONE (OUTPATIENT)
Dept: INTERNAL MEDICINE CLINIC | Age: 72
End: 2022-02-04

## 2022-02-04 DIAGNOSIS — M54.31 BILATERAL SCIATICA: ICD-10-CM

## 2022-02-04 DIAGNOSIS — I10 ESSENTIAL HYPERTENSION: ICD-10-CM

## 2022-02-04 DIAGNOSIS — G62.9 NEUROPATHY: ICD-10-CM

## 2022-02-04 DIAGNOSIS — M54.32 BILATERAL SCIATICA: ICD-10-CM

## 2022-02-04 DIAGNOSIS — R20.0 NUMBNESS AND TINGLING: ICD-10-CM

## 2022-02-04 DIAGNOSIS — G56.01 CARPAL TUNNEL SYNDROME OF RIGHT WRIST: Primary | ICD-10-CM

## 2022-02-04 DIAGNOSIS — R20.2 NUMBNESS AND TINGLING: ICD-10-CM

## 2022-02-04 PROCEDURE — 99442 PR PHYS/QHP TELEPHONE EVALUATION 11-20 MIN: CPT | Performed by: INTERNAL MEDICINE

## 2022-02-04 RX ORDER — GABAPENTIN 300 MG/1
300 CAPSULE ORAL
Qty: 60 CAPSULE | Refills: 1 | Status: SHIPPED | OUTPATIENT
Start: 2022-02-04 | End: 2022-03-08 | Stop reason: SDUPTHER

## 2022-02-04 RX ORDER — METHYLPREDNISOLONE 4 MG/1
TABLET ORAL
Qty: 1 DOSE PACK | Refills: 0 | Status: SHIPPED | OUTPATIENT
Start: 2022-02-04 | End: 2022-03-29 | Stop reason: ALTCHOICE

## 2022-02-04 NOTE — TELEPHONE ENCOUNTER
Called, spoke to pt  Received two pt identifiers  Got pt on for vv with Dr. Mey Burch 02/04/22  closing

## 2022-02-04 NOTE — TELEPHONE ENCOUNTER
----- Message from Sami Camejo sent at 2/4/2022 11:32 AM EST -----  Subject: Message to Provider    QUESTIONS  Information for Provider? pt tried to call back but did not get an answer,   please call pt back as soon as you can.  ---------------------------------------------------------------------------  --------------  CALL BACK INFO  What is the best way for the office to contact you? OK to leave message on   voicemail  Preferred Call Back Phone Number? [phone]  ---------------------------------------------------------------------------  --------------  SCRIPT ANSWERS  Relationship to Patient?  Self

## 2022-02-04 NOTE — TELEPHONE ENCOUNTER
Pt returned call, states didn't hear phone ring.     States has volume turned all the way up now    States please call back [phone]

## 2022-02-14 ENCOUNTER — TELEPHONE (OUTPATIENT)
Dept: INTERNAL MEDICINE CLINIC | Age: 72
End: 2022-02-14

## 2022-02-14 NOTE — TELEPHONE ENCOUNTER
----- Message from Meka Wood sent at 2/14/2022 11:50 AM EST -----  Subject: Message to Provider    QUESTIONS  Information for Provider? Patient said the Neurologist that you referred   her too is no longer taking patients and would not be able to see anyone   until July, can you please call her back. ---------------------------------------------------------------------------  --------------  Yesika DURHAM  What is the best way for the office to contact you? OK to leave message on   voicemail  Preferred Call Back Phone Number? [phone]  ---------------------------------------------------------------------------  --------------  SCRIPT ANSWERS  Relationship to Patient?  Self

## 2022-02-14 NOTE — TELEPHONE ENCOUNTER
Called, spoke with Pt. Two pt identifiers confirmed. Pt informed she will need to make an appt and see if their is wait list or she can call her insurance and see who else they cover. Pt stated she will call the other office back. Pt verbalized understanding of information discussed w/ no further questions at this time.

## 2022-03-08 DIAGNOSIS — G62.9 NEUROPATHY: ICD-10-CM

## 2022-03-08 DIAGNOSIS — R20.0 NUMBNESS AND TINGLING: ICD-10-CM

## 2022-03-08 DIAGNOSIS — R20.2 NUMBNESS AND TINGLING: ICD-10-CM

## 2022-03-08 RX ORDER — GABAPENTIN 300 MG/1
300 CAPSULE ORAL
Qty: 60 CAPSULE | Refills: 1 | Status: SHIPPED | OUTPATIENT
Start: 2022-03-08 | End: 2022-04-29 | Stop reason: SDUPTHER

## 2022-03-09 ENCOUNTER — TELEPHONE (OUTPATIENT)
Dept: INTERNAL MEDICINE CLINIC | Age: 72
End: 2022-03-09

## 2022-03-09 NOTE — TELEPHONE ENCOUNTER
Patient states she needs a call to be made to [facility] as they advised they have not received Prescription showing sent yesterday for increased Dosage on her Gabapentin to 300 mg's 2 times a day. Please call Pharmacy & then call patient for update.  Thank you      Patient has upcoming appt on Tuesday, 3/29/22 & was reminded of appt & to arrive at 11 am.

## 2022-03-09 NOTE — TELEPHONE ENCOUNTER
Called, spoke with Pt. Two pt identifiers confirmed. Pt informed that medication was sent to [facility] yesterday and it was confirmed by them yesterday as well. Pt informed to call them again as this medication can not be faxed in or called in over the phone since it is a narcotic. Pt stated ok she will. Pt verbalized understanding of information discussed w/ no further questions at this time.

## 2022-03-18 PROBLEM — M85.80 OSTEOPENIA: Status: ACTIVE | Noted: 2017-03-22

## 2022-03-28 NOTE — PATIENT INSTRUCTIONS

## 2022-03-28 NOTE — PROGRESS NOTES
This is the Subsequent Medicare Annual Wellness Exam, performed 12 months or more after the Initial AWV or the last Subsequent AWV    I have reviewed the patient's medical history in detail and updated the computerized patient record. Assessment/Plan   Education and counseling provided:  Are appropriate based on today's review and evaluation    1. Medicare annual wellness visit, subsequent       Depression Risk Factor Screening     3 most recent PHQ Screens 3/29/2022   Little interest or pleasure in doing things Not at all   Feeling down, depressed, irritable, or hopeless Not at all   Total Score PHQ 2 0       Alcohol & Drug Abuse Risk Screen    Do you average more than 1 drink per night or more than 7 drinks a week:  No    On any one occasion in the past three months have you have had more than 3 drinks containing alcohol:  No          Functional Ability and Level of Safety    Hearing: Hearing is good. Activities of Daily Living: The home contains: handrails and grab bars  Patient does total self care      Ambulation: with no difficulty     Fall Risk:  Fall Risk Assessment, last 12 mths 3/29/2022   Able to walk? Yes   Fall in past 12 months?  0      Abuse Screen:  Patient is not abused   live w      Cognitive Screening    Has your family/caregiver stated any concerns about your memory: no     Cognitive Screening: Normal - Mini Cog Test     No memory concerns   Pt knows the month, day and year   Can recall 3/3 objects     Health Maintenance Due     Health Maintenance Due   Topic Date Due    Shingrix Vaccine Age 49> (1 of 2) Never done    Medicare Yearly Exam  03/31/2022       Patient Care Team   Patient Care Team:  Pattie Quiroz MD as PCP - General (Internal Medicine)  Pattie Quiroz MD as PCP - [facility] Empaneled Provider  Tess Mauro MD (Dermatology)  Thomas Ross MD (Gastroenterology)  Benito Vaughan MD (Gynecology)  Dangelo Jim MD (Cardiology)  Stef Dasilva, MD (Vascular Surgery)    History     Patient Active Problem List   Diagnosis Code    Vitiligo L80    Bursitis of shoulder, right M75.51    Symptomatic menopausal or female climacteric states N95.1    Essential hypertension I10    Osteopenia M85.80     Past Medical History:   Diagnosis Date    HTN (hypertension) 5/13/2010    Vitiligo 5/13/2010    ALEXUS Gasca      Past Surgical History:   Procedure Laterality Date    ENDOSCOPY, COLON, DIAGNOSTIC  2003    hyperplastic polyps; 10 year repeat; Dr. Elsa Pop    FOOT/TOES SURGERY PROC UNLISTED      Bone spurs removed R & L foot     HX HEENT  03/2013    cataract removal    Gisele Pina HX TUBAL LIGATION       Current Outpatient Medications   Medication Sig Dispense Refill    gabapentin (NEURONTIN) 300 mg capsule Take 1 Capsule by mouth two (2) times daily as needed for Pain. Max Daily Amount: 600 mg. 60 Capsule 1    methylPREDNISolone (MEDROL DOSEPACK) 4 mg tablet Per pack 1 Dose Pack 0    amLODIPine-benazepril (LOTREL) 10-20 mg per capsule TAKE 1 CAPSULE EVERY DAY 90 Capsule 1    chlorthalidone (HYGROTON) 25 mg tablet Take 1 Tablet by mouth daily. 90 Tablet 1    cholecalciferol, vitamin D3, (VITAMIN D3) 2,000 unit tab Take  by mouth.  MULTIVITAMIN PO Take  by mouth. Allergies   Allergen Reactions    Tramadol Nausea Only       Family History   Problem Relation Age of Onset    Hypertension Sister     Stroke Sister 61    Diabetes Sister     Tuberculosis Sister     Stroke Mother 80        TIA    Cancer Father         Lung cancer     Social History     Tobacco Use    Smoking status: Never Smoker    Smokeless tobacco: Never Used   Substance Use Topics    Alcohol use: Yes     Comment: rarely        ACP not on file. SDMs are [employer]  () or her daughter.    Provided information in the past.        Colonoscopy: 5/11/18 at [facility]  per pt 5 year repeat  Pap: 5/ 20 sandra no, will only f/u if has an issue   Mammogram: [facility]  negative annual   DEXA:  21 osteopenia, fosamax failed--declines further tx or eval--q2 years       Tdap: 2/10/2016  Pneumovax: 21  Zghafwi02: 18  Shingrix: declines  Flu shot:  21    Lipids:   annual   A1c:   5.3 annual     Eye exam: [facility] ([phone]),  annual       EK17,  T inversions in the anterior leads  Hep C screen: , negative    Covid: three doses (Pfizer)    Medication reconciliation completed by MA and reviewed by me. Medical/surgical/social/family history reviewed and updated by me. Patient provided AVS and preventative screening table. Patient verbalized understanding of all information discussed.       Dino Posey MD

## 2022-03-28 NOTE — PROGRESS NOTES
HISTORY OF PRESENT ILLNESS  Marjorie Cruz is a 66 y.o. female. HPI     Last here vv 2/04/22. Pt is here to f/u on chronic conditions. Has a history of hypertension   BP today is 133/66  BP at home 120/74 116/61  Continues lotrel 10-20mg and chlorthalidone 25mg daily     Wt today is 133 lbs, up 2 lbs x lov   Her wt is close to nl range      Reviewed labs   Will get labs today    She is taking vit D otc    Has appt with Dr. Carolann Nieves Friday 4/1/22 for hand numbness this is a chronic issue she has a history of carpal tunnel in the past as well  C/o some foot numbness as well but hands are worse     Saw Dr. Thai Serrano (cardio) for her palpitations in the past  Last visit was 10/3/17  Her sx have since resolved        Follows with Dr. Belen Lindsay (neuro) for numbness and tingling she saw him several times had an EMG showed no neuropathy   Last visit 3/15/21  She later went to hand surgeon Dr. April Kurtz for carpal tunnel syndrome  She had EMG 11/20: did not show neuropathy in hand she will be seeing hand surgeon see above  Takes gabapentin 300mg BID which helps little bit with her hands but she also has generalized tingling and pain all over her body in different areas including her arms and legs  F/u scheduled 5/23/22 with Dr. Judy Wright to review her neuropathy symptoms     Pt follows with Dr. Toi Rubinstein (derm)    Last visit was 4/17   Pt states she only f/u if she has a problem      Pt saw Dr Janice Marx (vascular) for blood flow in legs   No swelling in legs currently   Last visit 3/19       Continues vit D OTC 2000U daily   Reviewed DEXA 12/23/21: osteopenia  Fosamax was started in 4/18, but pt stopped taking this after trying twice  Declines further eval /tx    Pt lives with her     Pt is functionally independent       No memory concerns   Knows the month, date, year, location   Can recall 3/3 objects      ACP not on file. SDMs are [employer]  () or her daughter.    Provided information in the past.      PREVENTIVE:    Colonoscopy: 18 at [facility]  per pt 5 year repeat  Pap:  sandra no, will only f/u if has an issue   Mammogram: [facility]  negative  DEXA:  21 osteopenia, fosamax failed--declines further tx or eval   Tdap: 2/10/2016  Pneumovax: 21  Ldeehws15: 18  Shingrix: declines  Flu shot:  21  A1c:  nl, 3/17 5.4  5.1  5.3  Eye exam: [facility] ([phone]), last week 3/22  Lipids:    EK17, T inversions in the anterior leads  Hep C screen: , negative  Covid: three doses (Pfizer)    Patient Active Problem List    Diagnosis Date Noted    Osteopenia 2017    Essential hypertension 2016    Symptomatic menopausal or female climacteric states 10/25/2013    Bursitis of shoulder, right 2012    Vitiligo 2010     Current Outpatient Medications   Medication Sig Dispense Refill    gabapentin (NEURONTIN) 300 mg capsule Take 1 Capsule by mouth two (2) times daily as needed for Pain. Max Daily Amount: 600 mg. 60 Capsule 1    amLODIPine-benazepril (LOTREL) 10-20 mg per capsule TAKE 1 CAPSULE EVERY DAY 90 Capsule 1    chlorthalidone (HYGROTON) 25 mg tablet Take 1 Tablet by mouth daily. 90 Tablet 1    cholecalciferol, vitamin D3, (VITAMIN D3) 2,000 unit tab Take  by mouth.  MULTIVITAMIN PO Take  by mouth.        Past Surgical History:   Procedure Laterality Date    ENDOSCOPY, COLON, DIAGNOSTIC      hyperplastic polyps; 10 year repeat; Dr. Johnathan Coe    FOOT/TOES SURGERY [facility]      Bone spurs removed R & L foot     HX HEENT  2013    cataract removal    Sarah Perez    HX TUBAL LIGATION        Lab Results   Component Value Date/Time    WBC 4.2 2021 12:14 PM    HGB 13.4 2021 12:14 PM    HCT 42.8 2021 12:14 PM    PLATELET 944  12:14 PM    MCV 97.1 2021 12:14 PM     Lab Results   Component Value Date/Time    Cholesterol, total 176 09/28/2021 12:14 PM    HDL Cholesterol 54 09/28/2021 12:14 PM    LDL, calculated 109.6 (H) 09/28/2021 12:14 PM    Triglyceride 62 09/28/2021 12:14 PM    CHOL/HDL Ratio 3.3 09/28/2021 12:14 PM     Lab Results   Component Value Date/Time    GFR est non-AA 52 (L) 09/28/2021 12:14 PM    GFR est AA >60 09/28/2021 12:14 PM    Creatinine 1.05 (H) 09/28/2021 12:14 PM    BUN 22 (H) 09/28/2021 12:14 PM    Sodium 137 09/28/2021 12:14 PM    Potassium 4.1 09/28/2021 12:14 PM    Chloride 102 09/28/2021 12:14 PM    CO2 32 09/28/2021 12:14 PM        Review of Systems   Respiratory: Negative for shortness of breath. Cardiovascular: Negative for chest pain. Neurological:        Hand and foot numbness       Physical Exam  Constitutional:       General: She is not in acute distress. Appearance: Normal appearance. She is not ill-appearing, toxic-appearing or diaphoretic. HENT:      Head: Normocephalic and atraumatic. Right Ear: External ear normal.      Left Ear: External ear normal.   Eyes:      General:         Right eye: No discharge. Left eye: No discharge. Conjunctiva/sclera: Conjunctivae normal.      Pupils: Pupils are equal, round, and reactive to light. Cardiovascular:      Rate and Rhythm: Normal rate and regular rhythm. Heart sounds: No murmur heard. No friction rub. No gallop. Pulmonary:      Effort: No respiratory distress. Breath sounds: Normal breath sounds. No wheezing or rales. Chest:      Chest wall: No tenderness. Musculoskeletal:         General: Normal range of motion. Cervical back: Normal range of motion and neck supple. Right lower leg: No edema. Left lower leg: No edema. Skin:     General: Skin is warm and dry. Neurological:      Mental Status: She is alert and oriented to person, place, and time. Mental status is at baseline.       Coordination: Coordination normal.      Gait: Gait normal.   Psychiatric:         Mood and Affect: Mood normal.         Behavior: Behavior normal.         ASSESSMENT and PLAN    ICD-10-CM ICD-9-CM    1. Essential hypertension       Controlled on Lotrel and chlorthalidone       H82 407.4 METABOLIC PANEL, BASIC   2. Medicare annual wellness visit, subsequent  Z00.00 V70.0    3. Osteopenia, unspecified location     Vitamin D for treatment M85.80 733.90    4. Neuropathy       Continue gabapentin 300 mg twice daily    Discussed we could increase it to 3 of these per day if needed    For right now she like to continue that you have done and will see hand surgeon on Friday and neurology 1 month G62.9 355.9    5. Vitamin D deficiency     Check level continue over-the-counter vitamin D E55.9 268.9 VITAMIN D, 25 HYDROXY        Depression screen reviewed and negative     Scribed by Nichole Willson of [facility], as dictated by Dr. Hal Redmond. Current diagnosis and concerns discussed with pt at length. Pt understands risks and benefits or current treatment plan and medications, and accepts the treatment and medication with any possible risks. Pt asks appropriate questions, which were answered. Pt was instructed to call with any concerns or problems. I have reviewed the note documented by the scribe. The services provided are my own.   The documentation is accurate

## 2022-03-29 ENCOUNTER — OFFICE VISIT (OUTPATIENT)
Dept: INTERNAL MEDICINE CLINIC | Age: 72
End: 2022-03-29
Payer: MEDICARE

## 2022-03-29 VITALS
TEMPERATURE: 98.1 F | SYSTOLIC BLOOD PRESSURE: 133 MMHG | BODY MASS INDEX: 26.19 KG/M2 | DIASTOLIC BLOOD PRESSURE: 66 MMHG | WEIGHT: 133.4 LBS | HEIGHT: 60 IN | OXYGEN SATURATION: 98 % | HEART RATE: 73 BPM | RESPIRATION RATE: 16 BRPM

## 2022-03-29 DIAGNOSIS — E55.9 VITAMIN D DEFICIENCY: ICD-10-CM

## 2022-03-29 DIAGNOSIS — I10 ESSENTIAL HYPERTENSION: Primary | ICD-10-CM

## 2022-03-29 DIAGNOSIS — G62.9 NEUROPATHY: ICD-10-CM

## 2022-03-29 DIAGNOSIS — M85.80 OSTEOPENIA, UNSPECIFIED LOCATION: ICD-10-CM

## 2022-03-29 DIAGNOSIS — Z00.00 MEDICARE ANNUAL WELLNESS VISIT, SUBSEQUENT: ICD-10-CM

## 2022-03-29 PROCEDURE — G9899 SCRN MAM PERF RSLTS DOC: HCPCS | Performed by: INTERNAL MEDICINE

## 2022-03-29 PROCEDURE — 3017F COLORECTAL CA SCREEN DOC REV: CPT | Performed by: INTERNAL MEDICINE

## 2022-03-29 PROCEDURE — G8536 NO DOC ELDER MAL SCRN: HCPCS | Performed by: INTERNAL MEDICINE

## 2022-03-29 PROCEDURE — 1101F PT FALLS ASSESS-DOCD LE1/YR: CPT | Performed by: INTERNAL MEDICINE

## 2022-03-29 PROCEDURE — G8754 DIAS BP LESS 90: HCPCS | Performed by: INTERNAL MEDICINE

## 2022-03-29 PROCEDURE — G8399 PT W/DXA RESULTS DOCUMENT: HCPCS | Performed by: INTERNAL MEDICINE

## 2022-03-29 PROCEDURE — G8510 SCR DEP NEG, NO PLAN REQD: HCPCS | Performed by: INTERNAL MEDICINE

## 2022-03-29 PROCEDURE — G0439 PPPS, SUBSEQ VISIT: HCPCS | Performed by: INTERNAL MEDICINE

## 2022-03-29 PROCEDURE — G8419 CALC BMI OUT NRM PARAM NOF/U: HCPCS | Performed by: INTERNAL MEDICINE

## 2022-03-29 PROCEDURE — 1090F PRES/ABSN URINE INCON ASSESS: CPT | Performed by: INTERNAL MEDICINE

## 2022-03-29 PROCEDURE — 99213 OFFICE O/P EST LOW 20 MIN: CPT | Performed by: INTERNAL MEDICINE

## 2022-03-29 PROCEDURE — G8427 DOCREV CUR MEDS BY ELIG CLIN: HCPCS | Performed by: INTERNAL MEDICINE

## 2022-03-29 PROCEDURE — G8752 SYS BP LESS 140: HCPCS | Performed by: INTERNAL MEDICINE

## 2022-03-29 NOTE — PROGRESS NOTES
1. \"Have you been to the ER, urgent care clinic since your last visit? Hospitalized since your last visit? \" No    2. \"Have you seen or consulted any other health care providers outside of the [address] [place] [address] since your last visit? \" No     3. For patients aged 39-70: Has the patient had a colonoscopy / FIT/ Cologuard? Yes - no Care Gap present      If the patient is female:    4. For patients aged 41-77: Has the patient had a mammogram within the past 2 years? Yes - no Care Gap present      5. For patients aged 21-65: Has the patient had a pap smear?  NA - based on age or sex

## 2022-03-30 LAB
25(OH)D3 SERPL-MCNC: 48.9 NG/ML (ref 30–100)
ANION GAP SERPL CALC-SCNC: 4 MMOL/L (ref 5–15)
BUN SERPL-MCNC: 20 MG/DL (ref 6–20)
BUN/CREAT SERPL: 21 (ref 12–20)
CALCIUM SERPL-MCNC: 10.3 MG/DL (ref 8.5–10.1)
CHLORIDE SERPL-SCNC: 106 MMOL/L (ref 97–108)
CO2 SERPL-SCNC: 29 MMOL/L (ref 21–32)
CREAT SERPL-MCNC: 0.95 MG/DL (ref 0.55–1.02)
GLUCOSE SERPL-MCNC: 101 MG/DL (ref 65–100)
POTASSIUM SERPL-SCNC: 4.2 MMOL/L (ref 3.5–5.1)
SODIUM SERPL-SCNC: 139 MMOL/L (ref 136–145)

## 2022-04-29 DIAGNOSIS — R20.2 NUMBNESS AND TINGLING: ICD-10-CM

## 2022-04-29 DIAGNOSIS — G62.9 NEUROPATHY: ICD-10-CM

## 2022-04-29 DIAGNOSIS — R20.0 NUMBNESS AND TINGLING: ICD-10-CM

## 2022-05-02 ENCOUNTER — TELEPHONE (OUTPATIENT)
Dept: INTERNAL MEDICINE CLINIC | Age: 72
End: 2022-05-02

## 2022-05-02 NOTE — TELEPHONE ENCOUNTER
----- Message from Namrata Alis sent at 4/29/2022  4:34 PM EDT -----  Subject: Message to Provider    QUESTIONS  Information for Provider? pt switch from [facility] to [facility]   insurance, wanted office to know. Regarding prescription   ---------------------------------------------------------------------------  --------------  CALL BACK INFO  What is the best way for the office to contact you? OK to leave message on   voicemail  Preferred Call Back Phone Number? [phone]  ---------------------------------------------------------------------------  --------------  SCRIPT ANSWERS  Relationship to Patient?  Self

## 2022-05-03 RX ORDER — GABAPENTIN 300 MG/1
300 CAPSULE ORAL
Qty: 180 CAPSULE | Refills: 1 | Status: SHIPPED | OUTPATIENT
Start: 2022-05-03 | End: 2022-05-10 | Stop reason: SDUPTHER

## 2022-05-10 ENCOUNTER — TELEPHONE (OUTPATIENT)
Dept: INTERNAL MEDICINE CLINIC | Age: 72
End: 2022-05-10

## 2022-05-10 DIAGNOSIS — R20.0 NUMBNESS AND TINGLING: ICD-10-CM

## 2022-05-10 DIAGNOSIS — G62.9 NEUROPATHY: ICD-10-CM

## 2022-05-10 DIAGNOSIS — R20.2 NUMBNESS AND TINGLING: ICD-10-CM

## 2022-05-10 RX ORDER — GABAPENTIN 300 MG/1
300 CAPSULE ORAL
Qty: 270 CAPSULE | Refills: 1 | Status: SHIPPED | OUTPATIENT
Start: 2022-05-10 | End: 2022-09-26 | Stop reason: DRUGHIGH

## 2022-05-11 DIAGNOSIS — I10 ESSENTIAL HYPERTENSION: ICD-10-CM

## 2022-05-11 RX ORDER — CHLORTHALIDONE 25 MG/1
25 TABLET ORAL DAILY
Qty: 90 TABLET | Refills: 1 | Status: SHIPPED | OUTPATIENT
Start: 2022-05-11

## 2022-05-11 RX ORDER — AMLODIPINE AND BENAZEPRIL HYDROCHLORIDE 10; 20 MG/1; MG/1
CAPSULE ORAL
Qty: 90 CAPSULE | Refills: 1 | Status: SHIPPED | OUTPATIENT
Start: 2022-05-11

## 2022-05-11 NOTE — TELEPHONE ENCOUNTER
Future Appointments:  Future Appointments   Date Time Provider Ravindra Manuel   5/23/2022  2:40 PM MD ELBA Marcus BS AMB   10/3/2022 12:15 PM Jessika Clark MD [facility] BS AMB        Last Appointment With Me:  3/29/2022     Requested Prescriptions     Pending Prescriptions Disp Refills    amLODIPine-benazepril (LOTREL) 10-20 mg per capsule 90 Capsule 1     Sig: TAKE 1 CAPSULE EVERY DAY    chlorthalidone (HYGROTON) 25 mg tablet 90 Tablet 1     Sig: Take 1 Tablet by mouth daily.

## 2022-05-23 ENCOUNTER — OFFICE VISIT (OUTPATIENT)
Dept: NEUROLOGY | Age: 72
End: 2022-05-23
Payer: MEDICARE

## 2022-05-23 VITALS
TEMPERATURE: 96.6 F | WEIGHT: 133.4 LBS | HEIGHT: 60 IN | BODY MASS INDEX: 26.19 KG/M2 | SYSTOLIC BLOOD PRESSURE: 110 MMHG | RESPIRATION RATE: 16 BRPM | OXYGEN SATURATION: 98 % | HEART RATE: 80 BPM | DIASTOLIC BLOOD PRESSURE: 60 MMHG

## 2022-05-23 DIAGNOSIS — G62.9 SMALL FIBER NEUROPATHY: Primary | ICD-10-CM

## 2022-05-23 DIAGNOSIS — R20.2 NUMBNESS AND TINGLING: ICD-10-CM

## 2022-05-23 DIAGNOSIS — R20.0 NUMBNESS AND TINGLING: ICD-10-CM

## 2022-05-23 DIAGNOSIS — G62.9 NEUROPATHY: ICD-10-CM

## 2022-05-23 DIAGNOSIS — R20.2 PARESTHESIA: ICD-10-CM

## 2022-05-23 PROBLEM — N18.30 CHRONIC RENAL DISEASE, STAGE III (HCC): Status: ACTIVE | Noted: 2022-05-23

## 2022-05-23 PROCEDURE — 1090F PRES/ABSN URINE INCON ASSESS: CPT | Performed by: PSYCHIATRY & NEUROLOGY

## 2022-05-23 PROCEDURE — G8536 NO DOC ELDER MAL SCRN: HCPCS | Performed by: PSYCHIATRY & NEUROLOGY

## 2022-05-23 PROCEDURE — G8754 DIAS BP LESS 90: HCPCS | Performed by: PSYCHIATRY & NEUROLOGY

## 2022-05-23 PROCEDURE — G8427 DOCREV CUR MEDS BY ELIG CLIN: HCPCS | Performed by: PSYCHIATRY & NEUROLOGY

## 2022-05-23 PROCEDURE — G9899 SCRN MAM PERF RSLTS DOC: HCPCS | Performed by: PSYCHIATRY & NEUROLOGY

## 2022-05-23 PROCEDURE — G8752 SYS BP LESS 140: HCPCS | Performed by: PSYCHIATRY & NEUROLOGY

## 2022-05-23 PROCEDURE — G8417 CALC BMI ABV UP PARAM F/U: HCPCS | Performed by: PSYCHIATRY & NEUROLOGY

## 2022-05-23 PROCEDURE — 1101F PT FALLS ASSESS-DOCD LE1/YR: CPT | Performed by: PSYCHIATRY & NEUROLOGY

## 2022-05-23 PROCEDURE — G8399 PT W/DXA RESULTS DOCUMENT: HCPCS | Performed by: PSYCHIATRY & NEUROLOGY

## 2022-05-23 PROCEDURE — 99215 OFFICE O/P EST HI 40 MIN: CPT | Performed by: PSYCHIATRY & NEUROLOGY

## 2022-05-23 PROCEDURE — G8510 SCR DEP NEG, NO PLAN REQD: HCPCS | Performed by: PSYCHIATRY & NEUROLOGY

## 2022-05-23 PROCEDURE — 3017F COLORECTAL CA SCREEN DOC REV: CPT | Performed by: PSYCHIATRY & NEUROLOGY

## 2022-05-23 PROCEDURE — 1123F ACP DISCUSS/DSCN MKR DOCD: CPT | Performed by: PSYCHIATRY & NEUROLOGY

## 2022-05-23 NOTE — PROGRESS NOTES
Chief Complaint   Patient presents with    Follow-up     Former patient Dr Skyler Marcus C/O bilateral hand numbness

## 2022-05-27 NOTE — PROGRESS NOTES
Neurology Consult Note      HISTORY PROVIDED BY: patient    Chief Complaint:   Chief Complaint   Patient presents with    Follow-up     Former patient Dr Cooper Rocha C/O bilateral hand numbness      Subjective:    Jacinto Kumar is a 66 y.o. right handed female who presents in office for continuation of care. This is a 69-year-old right-handed female with history of hypertension, vitiligo, who is a former patient of Dr. Cooper Rocha, currently establishing care with me. Patient is being followed for numbness and tingling sensation of the upper extremity that is hands. Patient is a says she is still experiencing numbness and tingling sensation of the hands, and  also the legs. She says sometimes it tends to be worse at night, the hands are worse in the legs. According to patient, at times, when she wakes up in the morning the hands are numb and she tries to shake it off. Patient has had EMG previously of the upper extremity done by Dr. Cooper Rocha which did not show evidence of peripheral neuropathy, small fiber neuropathy was suspected. Patient has been on Neurontin which she says helps some. At this time, I will obtain skin biopsy to evaluate for small fiber neuropathy, and possibly repeat EMG of all extremities. She denies dysphagia or odynophagia.   Review of Systems - General ROS: positive for  - fatigue and sleep disturbance  Psychological ROS: positive for - anxiety and sleep disturbances  Ophthalmic ROS: positive for - blurry vision  ENT ROS: positive for - vertigo and visual changes  Allergy and Immunology ROS: negative  Hematological and Lymphatic ROS: negative  Endocrine ROS: negative  Respiratory ROS: no cough, shortness of breath, or wheezing  Cardiovascular ROS: no chest pain or dyspnea on exertion  Gastrointestinal ROS: no abdominal pain, change in bowel habits, or black or bloody stools  Genito-Urinary ROS: no dysuria, trouble voiding, or hematuria  Musculoskeletal ROS: positive for - joint pain, joint stiffness, muscle pain and muscular weakness  Neurological ROS: positive for - dizziness, gait disturbance, impaired coordination/balance, numbness/tingling, visual changes and weakness  Dermatological ROS: negative    Past Medical History:   Diagnosis Date    HTN (hypertension) 5/13/2010    Vitiligo 5/13/2010    ALEXUS Dsailva      Past Surgical History:   Procedure Laterality Date    ENDOSCOPY, COLON, DIAGNOSTIC  2003    hyperplastic polyps; 10 year repeat; Dr. Angela Schrader FOOT/TOES SURGERY [facility]      Bone spurs removed R & L foot     HX HEENT  03/2013    cataract removal    HX Adine Dhruv    Dr. Polanco Emperor HX TUBAL LIGATION        Social History     Socioeconomic History    Marital status:      Spouse name: Not on file    Number of children: Not on file    Years of education: Not on file    Highest education level: Not on file   Occupational History    Not on file   Tobacco Use    Smoking status: Never Smoker    Smokeless tobacco: Never Used   Vaping Use    Vaping Use: Never used   Substance and Sexual Activity    Alcohol use: Yes     Comment: none    Drug use: No    Sexual activity: Not Currently     Partners: Male     Birth control/protection: Surgical   Other Topics Concern    Not on file   Social History Narrative    Not on file     Social Determinants of Health     Financial Resource Strain:     Difficulty of Paying Living Expenses: Not on file   Food Insecurity:     Worried About [address] in the Last Year: Not on file    [address] in the Last Year: Not on file   Transportation Needs:     Lack of Transportation (Medical): Not on file    Lack of Transportation (Non-Medical):  Not on file   Physical Activity:     Days of Exercise per Week: Not on file    Minutes of Exercise per Session: Not on file   Stress:     Feeling of Stress : Not on file   Social Connections:     Frequency of Communication with Friends and Family: Not on file    Frequency of Social Gatherings with Friends and Family: Not on file    Attends Caodaism Services: Not on file    Active Member of Clubs or Organizations: Not on file    Attends Club or Organization Meetings: Not on file    Marital Status: Not on file   Intimate Partner Violence:     Fear of Current or Ex-Partner: Not on file    Emotionally Abused: Not on file    Physically Abused: Not on file    Sexually Abused: Not on file   Housing Stability:     Unable to Pay for Housing in the Last Year: Not on file    Number of [place] in the Last Year: Not on file    Unstable Housing in the Last Year: Not on file     Family History   Problem Relation Age of Onset    Hypertension Sister     Stroke Sister 61    Diabetes Sister     Tuberculosis Sister     Stroke Mother 80        TIA    Cancer Father         Lung cancer         Objective:   ROS    Allergies   Allergen Reactions    Tramadol Nausea Only        Meds:  Outpatient Medications Prior to Visit   Medication Sig Dispense Refill    amLODIPine-benazepril (LOTREL) 10-20 mg per capsule TAKE 1 CAPSULE EVERY DAY 90 Capsule 1    chlorthalidone (HYGROTON) 25 mg tablet Take 1 Tablet by mouth daily. 90 Tablet 1    gabapentin (NEURONTIN) 300 mg capsule Take 1 Capsule by mouth three (3) times daily as needed for Pain. Max Daily Amount: 900 mg. 270 Capsule 1    cholecalciferol, vitamin D3, (VITAMIN D3) 2,000 unit tab Take  by mouth.  MULTIVITAMIN PO Take  by mouth. No facility-administered medications prior to visit. Imaging:  MRI Results (most recent):  No results found for this or any previous visit. CT Results (most recent):  No results found for this or any previous visit.        Reviewed records in [employer] [facility] [employer] tab today    Lab Review   Results for orders placed or performed in visit on 03/29/22   VITAMIN D, 25 HYDROXY   Result Value Ref Range    Vitamin D 25-Hydroxy 48.9 30 - 672 ng/mL   METABOLIC PANEL, BASIC   Result Value Ref Range    Sodium 139 136 - 145 mmol/L    Potassium 4.2 3.5 - 5.1 mmol/L    Chloride 106 97 - 108 mmol/L    CO2 29 21 - 32 mmol/L    Anion gap 4 (L) 5 - 15 mmol/L    Glucose 101 (H) 65 - 100 mg/dL    BUN 20 6 - 20 MG/DL    Creatinine 0.95 0.55 - 1.02 MG/DL    BUN/Creatinine ratio 21 (H) 12 - 20      GFR est AA >60 >60 ml/min/1.73m2    GFR est non-AA 58 (L) >60 ml/min/1.73m2    Calcium 10.3 (H) 8.5 - 10.1 MG/DL        Exam:  Visit Vitals  /60 (BP 1 Location: Right upper arm, BP Patient Position: Sitting, BP Cuff Size: Adult)   Pulse 80   Temp (!) 96.6 °F (35.9 °C) (Temporal)   Resp 16   Ht 5' (1.524 m)   Wt 133 lb 6.4 oz (60.5 kg)   LMP 06/10/2006   SpO2 98%   BMI 26.05 kg/m²     General:  Alert, cooperative, no distress. Head:  Normocephalic, without obvious abnormality, atraumatic. Respiratory:  Heart:   Non labored breathing  Regular rate and rhythm, no murmurs   Neck:   2+ carotids, no bruits   Extremities: Warm, no cyanosis or edema. Pulses: 2+ radial pulses. Neurologic:  MS: Alert and oriented x 4, speech intact. Language intact, able to name, repeat, and follow all commands. Attention and fund of knowledge appropriate. Recent and remote memory intact.   Cranial Nerves:  II: visual fields Full to confrontation   II: pupils Equal, round, reactive to light   II: optic disc No papilledema   III,VII: ptosis none   III,IV,VI: extraocular muscles  EOMI, no nystagmus or diplopia   V: facial light touch sensation  normal   VII: facial muscle function   symmetric   VIII: hearing intact   IX: soft palate elevation  normal   XI: trapezius strength  5/5   XI: sternocleidomastoid strength 5/5   XII: tongue  Midline     Motor: normal bulk and tone, no tremor              Strength: 5/5 throughout, no PD  Sensory: Decreased sensation to LT, PP, temperature and vibration bilateral lower extremity in stocking distribution, upper extremity in glove distribution  Coordination: FTN and HTS intact, WILLIAMS intact,Romberg negative  Gait: normal gait, able to heel, toe, and tandem walk  Reflexes: 3+ symmetric  Plantar: Withdrawal           Assessment/Plan       ICD-10-CM ICD-9-CM    1. Small fiber neuropathy  G62.9 356.9 REFERRAL TO NEUROLOGY      CANCELED: REFERRAL TO NEUROLOGY   2. Paresthesia  R20.2 782.0 REFERRAL TO NEUROLOGY      CANCELED: REFERRAL TO NEUROLOGY   3. Neuropathy  G62.9 355.9 REFERRAL TO NEUROLOGY      CANCELED: REFERRAL TO NEUROLOGY   4. Numbness and tingling  R20.0 782.0 CANCELED: REFERRAL TO NEUROLOGY    R20.2     Plan:  Continue Neurontin  I will obtain skin biopsy to evaluate for small fiber neuropathy. Will repeat EMG/nerve conduction study after skin biopsy    Follow-up and Dispositions    · Return in about 4 months (around 9/23/2022).            Signed:  Meera Kee MD  5/23/2022

## 2022-06-22 ENCOUNTER — TELEPHONE (OUTPATIENT)
Dept: NEUROLOGY | Age: 72
End: 2022-06-22

## 2022-06-24 NOTE — PROGRESS NOTES
Neurology Note    Patient ID:  Brad Haines  [id number]  66 y.o.  1950      Date of Consultation:  June 24, 2022        Assessment and Plan:    The patient is a pleasant 65-year-old female who was referred for a skin biopsy for possible small fiber neuropathy. PERFORMING PHYSICIAN: Yarely Harrison DO FAAN  PROCEDURE:  Skin Punch Biopsy      ICD-10 Code: Polyneuropathy, unspecified  G62.9     Medications/ Supplies:   Skin-punch biopsy kit from [employer] including: skin punch biopsy tool, povidone/ iodine prep pad, alcohol prep pad   1% Lidocaine used for anesthesia. 3 mL syringe, 30G 1/2\" needle    The procedure and potential complications were explained to the patient, and verbal consent was obtained. A timeout was performed    The skin was cleansed with the betadine swabs over the:      - Right Calf (10 centimeters proximal to the lat malleolus)  - Right left proximal thigh  - Right  wrist - 6 cm proximal to the wrist    A total of 1.5 cc of 1% lidocaine  was used at each biopsy site. A skin punch biopsy was performed at the distal site with the provided biopsy tool, and the biopsy was placed into container labeled distal sample. The process was repeated at the proximal sites and the biopsies were placed into the appropriate containers. The containers were labeled with the patient names, lids tightened and placed on ice in the supplied styrofoam box and sent by [employer] to the lab. The biopsy sites were cleaned, pressure held, and then bandages applied. Blood loss was minimal.  The patient tolerated the procedure well. There were no immediate or obvious complications. The patient will be contacted about biopsy results. ___________________  Yarely Harrison DO FAAN               Subjective: I am here for a skin biopsy       History of Present Illness:   Brad Haines is a 66 y.o. female who was referred for a skin biopsy.   She is followed by Dr. Licha Gurrola in the neurology office. She complains of intermittent bouts of sensory disturbance. She did have a prior EMG/nerve conduction study that was normal.  Due to the persistence of her symptoms, a skin biopsy was ordered. Past Medical History:   Diagnosis Date    HTN (hypertension) 5/13/2010    Vitiligo 5/13/2010    ALEXUS Mayen        Past Surgical History:   Procedure Laterality Date    ENDOSCOPY, COLON, DIAGNOSTIC  2003    hyperplastic polyps; 10 year repeat; Dr. Nury Díaz    FOOT/TOES SURGERY PROC UNLISTED      Bone spurs removed R & L foot     HX HEENT  03/2013    cataract removal    Je Lind HX TUBAL LIGATION          Family History   Problem Relation Age of Onset    Hypertension Sister     Stroke Sister 61    Diabetes Sister     Tuberculosis Sister     Stroke Mother 80        TIA    Cancer Father         Lung cancer        Social History     Tobacco Use    Smoking status: Never Smoker    Smokeless tobacco: Never Used   Substance Use Topics    Alcohol use: Yes     Comment: none        Allergies   Allergen Reactions    Tramadol Nausea Only        Prior to Admission medications    Medication Sig Start Date End Date Taking? Authorizing Provider   amLODIPine-benazepril (LOTREL) 10-20 mg per capsule TAKE 1 CAPSULE EVERY DAY 5/11/22   Anh Braden MD   chlorthalidone (HYGROTON) 25 mg tablet Take 1 Tablet by mouth daily. 5/11/22   Anh Braden MD   gabapentin (NEURONTIN) 300 mg capsule Take 1 Capsule by mouth three (3) times daily as needed for Pain. Max Daily Amount: 900 mg. 5/10/22   Anh Braden MD   cholecalciferol, vitamin D3, (VITAMIN D3) 2,000 unit tab Take  by mouth. Provider, Historical   MULTIVITAMIN PO Take  by mouth.     Provider, Historical       Review of Systems:    General, constitutional: negative  Eyes, vision: negative  Ears, nose, throat: negative  Cardiovascular, heart: negative  Respiratory: negative  Gastrointestinal: negative  Genitourinary: negative  Musculoskeletal: negative  Skin and integumentary: negative  Psychiatric: negative  Endocrine: negative  Neurological: negative, except for HPI  Hematologic/lymphatic: negative  Allergy/immunology: negative    Objective:     Visit Vitals  LMP 06/10/2006       Physical Exam:      General:  appears well nourished in no acute distress  Neck: no carotid bruits  Lungs: clear to auscultation  Heart:  no murmurs, regular rate  Lower extremity: peripheral pulses palpable and no edema  Skin: intact        Labs:     Lab Results   Component Value Date/Time    Hemoglobin A1c 5.3 09/28/2021 12:14 PM    Sodium 139 03/29/2022 11:34 AM    Potassium 4.2 03/29/2022 11:34 AM    Chloride 106 03/29/2022 11:34 AM    Glucose 101 (H) 03/29/2022 11:34 AM    BUN 20 03/29/2022 11:34 AM    Creatinine 0.95 03/29/2022 11:34 AM    Calcium 10.3 (H) 03/29/2022 11:34 AM    WBC 4.2 09/28/2021 12:14 PM    HCT 42.8 09/28/2021 12:14 PM    HGB 13.4 09/28/2021 12:14 PM    PLATELET 605 03/06/4249 12:14 PM       Imaging:    No results found for this or any previous visit. No results found for this or any previous visit.              Patient Active Problem List   Diagnosis Code    Vitiligo L80    Bursitis of shoulder, right M75.51    Symptomatic menopausal or female climacteric states N95.1    Essential hypertension I10    Osteopenia M85.80    Chronic renal disease, stage III N18.30                   Signed By:  Vianca Cooney DO FAAN    June 24, 2022

## 2022-06-27 ENCOUNTER — OFFICE VISIT (OUTPATIENT)
Dept: NEUROLOGY | Age: 72
End: 2022-06-27
Payer: MEDICARE

## 2022-06-27 VITALS — DIASTOLIC BLOOD PRESSURE: 60 MMHG | SYSTOLIC BLOOD PRESSURE: 110 MMHG | HEART RATE: 80 BPM | OXYGEN SATURATION: 98 %

## 2022-06-27 DIAGNOSIS — G62.9 SMALL FIBER NEUROPATHY: ICD-10-CM

## 2022-06-27 DIAGNOSIS — G60.9 IDIOPATHIC SMALL FIBER PERIPHERAL NEUROPATHY: ICD-10-CM

## 2022-06-27 PROCEDURE — 11104 PUNCH BX SKIN SINGLE LESION: CPT | Performed by: PSYCHIATRY & NEUROLOGY

## 2022-06-27 PROCEDURE — 11105 PUNCH BX SKIN EA SEP/ADDL: CPT | Performed by: PSYCHIATRY & NEUROLOGY

## 2022-07-21 ENCOUNTER — TELEPHONE (OUTPATIENT)
Dept: NEUROLOGY | Age: 72
End: 2022-07-21

## 2022-08-11 NOTE — TELEPHONE ENCOUNTER
I called the patient, advised her that the skin biopsy was essentially normal, by leaving a message because she did not answer the phone, and told her to follow-up with Dr. Jimmy Jeff as arranged

## 2022-08-18 ENCOUNTER — OFFICE VISIT (OUTPATIENT)
Dept: NEUROLOGY | Age: 72
End: 2022-08-18
Payer: MEDICARE

## 2022-08-18 VITALS
HEART RATE: 70 BPM | WEIGHT: 134.8 LBS | OXYGEN SATURATION: 97 % | TEMPERATURE: 96.8 F | SYSTOLIC BLOOD PRESSURE: 140 MMHG | BODY MASS INDEX: 26.46 KG/M2 | RESPIRATION RATE: 16 BRPM | DIASTOLIC BLOOD PRESSURE: 80 MMHG | HEIGHT: 60 IN

## 2022-08-18 DIAGNOSIS — G56.01 MILD CARPAL TUNNEL SYNDROME OF RIGHT WRIST: Primary | ICD-10-CM

## 2022-08-18 DIAGNOSIS — M47.812 CERVICAL FACET JOINT SYNDROME: ICD-10-CM

## 2022-08-18 DIAGNOSIS — G62.9 SMALL FIBER NEUROPATHY: ICD-10-CM

## 2022-08-18 PROCEDURE — 1123F ACP DISCUSS/DSCN MKR DOCD: CPT | Performed by: NURSE PRACTITIONER

## 2022-08-18 PROCEDURE — 99213 OFFICE O/P EST LOW 20 MIN: CPT | Performed by: NURSE PRACTITIONER

## 2022-08-18 RX ORDER — MENTHOL 40 MG/ML
GEL TOPICAL
COMMUNITY

## 2022-08-18 NOTE — PATIENT INSTRUCTIONS
Follow up with Dr Luis Fernando Siegel for your hands. Keep the appointment with Dr Gemini Sandy in September. Your EMG did note Right Carpal tunnel syndrome. 1 portion of the skin biopsy did show small fiber neuropathy, continue the Neurontin, continue with regular exercise and monitor for worsening.

## 2022-08-18 NOTE — PROGRESS NOTES
[facility]  [address]  [address]  Tel: [phone]  Fax: [phone]      Date:  22     Name:  Enma Arce  :  1950  MRN:  [id number]     PCP:  Carlos Leone MD    Chief Complaint   Patient presents with    Follow-up     Small fiber neuropathy    Results     Skin biopsy       HISTORY OF PRESENT ILLNESS:  Patient presents today for numbness in the B hands (across the fingertips on the B hands), feels like its traveling up the arm. R hand feels cold. Some soreness in the left shoulder and the right leg feels larger, some swelling, but no major pain in the leg. Some hip/back pain at times. Gabapentin 300mg am and afternoon, 600mg PM: tones it down a whole lot, some sleepiness with it. Helps with bedtime sleepiness. Biofreeze helps her hand pain. Patient has been seen by Dr. Maybelle Sacks, who did a right carpal tunnel injection, EMG was also done as well which noted mild carpal tunnel syndrome no other radiculopathy, cervical x-rays ordered: severe facet arthropathy as well as discs height loss in intervertebral arthritis particularly at the lower spinal segments. No acute bony abnormality appreciated. She has not been back to see her since then. Pain in the right hand at times, x-ray done several years ago noted arthritis. .  2022 patient had a skin biopsy: Right arm: skin with normal epidermal nerve fiber density, right thigh skin with low normal epidermal nerve fiber density, right calf skin with normal epidermal nerve fiber density    REVIEW OF SYSTEMS:     Review of Systems   Musculoskeletal:  Positive for joint pain and myalgias (right arm at times). Neurological:  Positive for tingling and sensory change. Negative for tremors and weakness. Psychiatric/Behavioral:  The patient does not have insomnia. All other systems reviewed and are negative.       Current Outpatient Medications   Medication Sig    menthol (Biofreeze, menthol,) 4 % gel by Apply Externally route. amLODIPine-benazepril (LOTREL) 10-20 mg per capsule TAKE 1 CAPSULE EVERY DAY    chlorthalidone (HYGROTON) 25 mg tablet Take 1 Tablet by mouth daily. gabapentin (NEURONTIN) 300 mg capsule Take 1 Capsule by mouth three (3) times daily as needed for Pain. Max Daily Amount: 900 mg. MULTIVITAMIN PO Take  by mouth. No current facility-administered medications for this visit. Allergies   Allergen Reactions    Tramadol Nausea Only     Past Medical History:   Diagnosis Date    HTN (hypertension) 5/13/2010    Vitiligo 5/13/2010    ALEXUS White     Past Surgical History:   Procedure Laterality Date    ENDOSCOPY, COLON, DIAGNOSTIC  2003    hyperplastic polyps; 10 year repeat; Dr. Sarah Crawford    FOOT/TOES SURGERY PROC UNLISTED      Bone spurs removed R & L foot     HX HEENT  03/2013    cataract removal    HX Hesham Henry TUBAL LIGATION       Social History     Socioeconomic History    Marital status:      Spouse name: Not on file    Number of children: Not on file    Years of education: Not on file    Highest education level: Not on file   Occupational History    Not on file   Tobacco Use    Smoking status: Never    Smokeless tobacco: Never   Vaping Use    Vaping Use: Never used   Substance and Sexual Activity    Alcohol use:  Yes     Alcohol/week: 1.0 standard drink     Types: 1 Cans of beer per week     Comment: rarely    Drug use: No    Sexual activity: Not Currently     Partners: Male     Birth control/protection: Surgical   Other Topics Concern    Not on file   Social History Narrative    Not on file     Social Determinants of Health     Financial Resource Strain: Not on file   Food Insecurity: Not on file   Transportation Needs: Not on file   Physical Activity: Not on file   Stress: Not on file   Social Connections: Not on file   Intimate Partner Violence: Not on file   Housing Stability: Not on file     Family History Problem Relation Age of Onset    Hypertension Sister     Stroke Sister 61    Diabetes Sister     Tuberculosis Sister     Stroke Mother 80        TIA    Cancer Father         Lung cancer         PHYSICAL EXAMINATION:    Visit Vitals  BP (!) 140/80 (BP 1 Location: Right upper arm, BP Patient Position: Sitting, BP Cuff Size: Large adult)   Pulse 70   Temp 96.8 °F (36 °C) (Tympanic)   Resp 16   Ht 5' (1.524 m)   Wt 134 lb 12.8 oz (61.1 kg)   SpO2 97%   BMI 26.33 kg/m²       General:  Well defined, nourished, and well groomed individual in no acute distress. Neck: Supple, nontender, normal range of motion. Musculoskeletal: Negative drop arm test, no shoulder tenderness noted. Negative Tinel's bilateral wrist bilateral elbows. No specific pain to right hand, negative Finkelstein. Psych:  Good mood and bright affect    NEUROLOGICAL EXAMINATION:     Mental Status:   Alert and oriented to person, place, and time with recent and remote memory intact. Attention span and concentration are normal. Clear speech. Fund of knowledge preserved. Cranial Nerves:   PERRLA. Visual fields were full  EOM: no evidence of nystagmus  Facial sensation:  normal and symmetric  Facial motor: normal and symmetric, no facial droop noted. Hearing intact  SCM strength intact  Tongue: midline without fasciculations    Motor Examination: Normal tone. 5/5 muscle strength in bilateral upper and bilateral lower extremities. No muscle wasting, no twitching or fasciculation noted. Sensory exam:  Normal throughout to light touch in bilateral upper extremities, decreased sensation to sharp touch along patient's fingertips otherwise normal.  Normal sensation to light touch in bilateral lower extremities and sharp touch to bilateral lower extremities. Coordination:   Finger to nose and rapid arm movement testing was normal.  No resting or intention tremor. Negative Romberg, negative pronator drift.       Gait and Station:  Steady gait.    Reflexes:  DTRs 2+ in bilateral biceps, brachioradialis, patella. ASSESSMENT AND PLAN      ICD-10-CM ICD-9-CM    1. Mild carpal tunnel syndrome of right wrist  G56.01 354.0       2. Small fiber neuropathy  G62.9 356.9       3. Cervical facet joint syndrome  M47.812 723.8         1. Mild carpal tunnel syndrome of right wrist: She has been seen by Dr. Lavona Schaumann, EMG has been completed, and steroid injection has been completed she does have carpal tunnel splints, advised that she follow-up with her for further evaluation. 2. Small fiber neuropathy: Skin biopsy results reviewed with patient, can repeat in 6 to 12 months if any changes, continue gabapentin as prescribed  3. Cervical facet joint syndrome: Consider further evaluation if ongoing complaints into the right shoulder area, home exercise program encouraged. Monitor for worsening. Follow-up with Ortho. Patient and/or family verbalized understand of all instructions and all questions/concerns were addressed. Safety/side effects of medications discussed. It was advised the patient keep the appointment Dr Jj Vanessa in September.       Minnie Ayala, MARVA-BC

## 2022-08-18 NOTE — PROGRESS NOTES
Chief Complaint   Patient presents with    Follow-up     Small fiber neuropathy    Results     Skin biopsy     1. Have you been to the ER, urgent care clinic since your last visit? No Hospitalized since your last visit? No    2. Have you seen or consulted any other health care providers outside of the [address] [place] [address] since your last visit? No Include any pap smears or colon screening. No    Patient C/O right > left numbness and tingling of hands noted right leg larger than left with hip pain. Her BP @ [facility] 8/13/22 122/73 . She hasn't had medication today.

## 2022-09-26 ENCOUNTER — OFFICE VISIT (OUTPATIENT)
Dept: NEUROLOGY | Age: 72
End: 2022-09-26
Payer: MEDICARE

## 2022-09-26 VITALS
DIASTOLIC BLOOD PRESSURE: 70 MMHG | HEART RATE: 71 BPM | TEMPERATURE: 97.3 F | SYSTOLIC BLOOD PRESSURE: 110 MMHG | HEIGHT: 60 IN | WEIGHT: 133 LBS | OXYGEN SATURATION: 98 % | BODY MASS INDEX: 26.11 KG/M2 | RESPIRATION RATE: 15 BRPM

## 2022-09-26 DIAGNOSIS — R20.2 PARESTHESIA: ICD-10-CM

## 2022-09-26 DIAGNOSIS — M79.641 PAIN OF RIGHT HAND: ICD-10-CM

## 2022-09-26 DIAGNOSIS — M25.559 HIP PAIN: ICD-10-CM

## 2022-09-26 DIAGNOSIS — M47.812 CERVICAL FACET JOINT SYNDROME: ICD-10-CM

## 2022-09-26 DIAGNOSIS — R20.2 NUMBNESS AND TINGLING: Primary | ICD-10-CM

## 2022-09-26 DIAGNOSIS — G56.03 BILATERAL CARPAL TUNNEL SYNDROME: ICD-10-CM

## 2022-09-26 DIAGNOSIS — G62.9 NEUROPATHY: ICD-10-CM

## 2022-09-26 DIAGNOSIS — R20.0 NUMBNESS AND TINGLING: Primary | ICD-10-CM

## 2022-09-26 RX ORDER — GABAPENTIN 600 MG/1
600 TABLET ORAL 3 TIMES DAILY
Qty: 90 TABLET | Refills: 5 | Status: SHIPPED | OUTPATIENT
Start: 2022-09-26

## 2022-09-26 RX ORDER — DEXAMETHASONE 4 MG/1
TABLET ORAL
Qty: 20 TABLET | Refills: 0 | Status: SHIPPED | OUTPATIENT
Start: 2022-09-26 | End: 2022-11-16

## 2022-09-26 NOTE — PROGRESS NOTES
1. Have you been to the ER, urgent care clinic since your last visit? Hospitalized since your last visit? No.    2. Have you seen or consulted any other health care providers outside of the [address] [place] [address] since your last visit? Include any pap smears or colon screening.    No.        Chief Complaint   Patient presents with    Neuropathy     1 month- rt leg pain, numbness moving up to hip and moving to rt hand and moving to left hand

## 2022-09-26 NOTE — PROGRESS NOTES
Neurology Progress Note    NAME:  Marjorie Cruz   :   1950   MRN:   [id number]     Date/Time:  2022  Subjective:      Marjorie Cruz is a 67 y.o. female here today for follow up for neuropathy  Patient says she experiences from the right upper back going down to the leg. Pain is sharp in nature aggravated by activity. She says she is also experiencing numbness and tingling sensation. Patient noted that she has been compliant with her medications  Patient has been on Neurontin which she says helps some. Skin biopsy did not reveal small fiber neuropathy. Patient however continues to have noticed numbness and tingling sensation with pain  As Neurontin has been helpful somehow, I will adjust Neurontin to 600 mg p.o. 3 times daily. She will need intermittent physical therapy  She denies dysphagia or odynophagia.   Review of Systems - General ROS: positive for  - fatigue and sleep disturbance  Psychological ROS: positive for - anxiety and sleep disturbances  Ophthalmic ROS: positive for - blurry vision  ENT ROS: positive for - vertigo and visual changes  Allergy and Immunology ROS: negative  Hematological and Lymphatic ROS: negative  Endocrine ROS: negative  Respiratory ROS: no cough, shortness of breath, or wheezing  Cardiovascular ROS: no chest pain or dyspnea on exertion  Gastrointestinal ROS: no abdominal pain, change in bowel habits, or black or bloody stools  Genito-Urinary ROS: no dysuria, trouble voiding, or hematuria  Musculoskeletal ROS: positive for - joint pain, joint stiffness, muscle pain and muscular weakness  Neurological ROS: positive for - dizziness, gait disturbance, impaired coordination/balance, numbness/tingling, visual changes and weakness  Dermatological ROS: negative    Past Medical History:   Diagnosis Date       Medications reviewed:  Current Outpatient Medications   Medication Sig Dispense Refill    gabapentin (NEURONTIN) 600 mg tablet Take 1 Tablet by mouth three (3) times daily. Max Daily Amount: 1,800 mg. For Trigeminal Neuralgia 90 Tablet 5    menthol (Biofreeze, menthol,) 4 % gel by Apply Externally route. MULTIVITAMIN PO Take  by mouth daily. chlorthalidone (HYGROTON) 25 mg tablet Take 1 Tablet by mouth daily. 90 Tablet 1    amLODIPine-benazepril (LOTREL) 10-20 mg per capsule TAKE 1 CAPSULE EVERY DAY 90 Capsule 1    diclofenac potassium (CATAFLAM) 50 mg tablet Take 50 mg by mouth three (3) times daily. SULFAMETHOXAZOLE PO Take  by mouth.      predniSONE (DELTASONE) 20 mg tablet 60mg po daily for 2days, 40mg po daily for 2 days, 20mg po daily for 2days then stop 12 Tablet 0    gabapentin (NEURONTIN) 100 mg capsule Take 1 Capsule by mouth three (3) times daily. Max Daily Amount: 300 mg. 270 Capsule 1    potassium chloride (K-DUR, KLOR-CON M20) 20 mEq tablet Take 1 Tablet by mouth daily.  90 Tablet 0        Objective:   Vitals:  Vitals:    09/26/22 1058   BP: 110/70   Pulse: 71   Resp: 15   Temp: 97.3 °F (36.3 °C)   TempSrc: Temporal   SpO2: 98%   Weight: 133 lb (60.3 kg)   Height: 5' (1.524 m)   PainSc:   0 - No pain   LMP: 06/10/2006               Lab Data Reviewed:  Lab Results   Component Value Date/Time    WBC 4.2 10/03/2022 12:23 PM    HCT 45.2 10/03/2022 12:23 PM    HGB 14.3 10/03/2022 12:23 PM    PLATELET 293 98/46/5187 12:23 PM       Lab Results   Component Value Date/Time    Sodium 139 10/20/2022 11:00 AM    Potassium 3.9 10/20/2022 11:00 AM    Chloride 105 10/20/2022 11:00 AM    CO2 31 10/20/2022 11:00 AM    Glucose 89 10/20/2022 11:00 AM    BUN 22 (H) 10/20/2022 11:00 AM    Creatinine 1.13 (H) 10/20/2022 11:00 AM    Calcium 9.7 10/20/2022 11:00 AM       No components found for: TROPQUANT    No results found for: DANYA      Lab Results   Component Value Date/Time    Hemoglobin A1c 5.2 10/03/2022 12:23 PM        Lab Results   Component Value Date/Time    Vitamin B12 649 09/30/2020 02:45 PM       No results found for: DANYA, ANARX, ANAIGG, Sandra Denis    Lab Results   Component Value Date/Time    Cholesterol, total 212 (H) 10/03/2022 12:23 PM    HDL Cholesterol 61 10/03/2022 12:23 PM    LDL, calculated 138.4 (H) 10/03/2022 12:23 PM    VLDL, calculated 12.6 10/03/2022 12:23 PM    Triglyceride 63 10/03/2022 12:23 PM    CHOL/HDL Ratio 3.5 10/03/2022 12:23 PM         CT Results (recent):  No results found for this or any previous visit. MRI Results (recent):  No results found for this or any previous visit. IR Results (recent):  No results found for this or any previous visit. VAS/US Results (recent):  Results from Abstract encounter on 05/01/19    DUPLEX LOWER EXT ARTERY BILAT      PHYSICAL EXAM:  General:    Alert, cooperative, no distress, appears stated age. Head:   Normocephalic, without obvious abnormality, atraumatic. Eyes:   Conjunctivae/corneas clear. PERRLA  Nose:  Nares normal. No drainage or sinus tenderness. Throat:    Lips, mucosa, and tongue normal.  No Thrush  Neck:  Supple, symmetrical,  no adenopathy, thyroid: non tender    no carotid bruit and no JVD. Back:    Symmetric,  No CVA tenderness. Lungs:   Clear to auscultation bilaterally. No Wheezing or Rhonchi. No rales. Chest wall:  No tenderness or deformity. No Accessory muscle use. Heart:   Regular rate and rhythm,  no murmur, rub or gallop. Abdomen:   Soft, non-tender. Not distended. Bowel sounds normal. No masses  Extremities: Extremities normal, atraumatic, No cyanosis. No edema. No clubbing  Skin:     Texture, turgor normal. No rashes or lesions. Not Jaundiced  Lymph nodes: Cervical, supraclavicular normal.  Psych:  Good insight. Not depressed. Not anxious or agitated. NEUROLOGICAL EXAM:  Appearance: The patient is well developed, well nourished, provides a coherent history and is in no acute distress. Mental Status: Oriented to time, place and person. Mood and affect appropriate. Cranial Nerves:   Intact visual fields. Fundi are benign.  ADALI, EOM's full, no nystagmus, no ptosis. Facial sensation is normal. Corneal reflexes are intact. Facial movement is symmetric. Hearing is normal bilaterally. Palate is midline with normal sternocleidomastoid and trapezius muscles are normal. Tongue is midline. Motor:  5/5 strength in upper and lower proximal and distal muscles. Normal bulk and tone. No fasciculations. Reflexes:   Deep tendon reflexes 2+/4 and symmetrical.   Sensory:   Normal to touch, pinprick and vibration. Gait:  Normal gait. Tremor:   No tremor noted. Cerebellar:  No cerebellar signs present. Neurovascular:  Normal heart sounds and regular rhythm, peripheral pulses intact, and no carotid bruits. Assesment  1. Numbness and tingling    - gabapentin (NEURONTIN) 600 mg tablet; Take 1 Tablet by mouth three (3) times daily. Max Daily Amount: 1,800 mg. For Trigeminal Neuralgia  Dispense: 90 Tablet; Refill: 5    2. Paresthesia    - gabapentin (NEURONTIN) 600 mg tablet; Take 1 Tablet by mouth three (3) times daily. Max Daily Amount: 1,800 mg. For Trigeminal Neuralgia  Dispense: 90 Tablet; Refill: 5    3. Cervical facet joint syndrome    - REFERRAL TO PHYSICAL THERAPY    4. Hip pain    - REFERRAL TO PHYSICAL THERAPY    5. Pain of right hand    - REFERRAL TO PHYSICAL THERAPY    6. Bilateral carpal tunnel syndrome    - REFERRAL TO PHYSICAL THERAPY    7. Neuropathy    - gabapentin (NEURONTIN) 600 mg tablet; Take 1 Tablet by mouth three (3) times daily. Max Daily Amount: 1,800 mg. For Trigeminal Neuralgia  Dispense: 90 Tablet; Refill: 5    ___________________________________________________  PLAN:Medication anddiscussed with patient      ICD-10-CM ICD-9-CM    1. Numbness and tingling  R20.0 782.0 gabapentin (NEURONTIN) 600 mg tablet    R20.2        2. Paresthesia  R20.2 782.0 gabapentin (NEURONTIN) 600 mg tablet      3. Cervical facet joint syndrome  M47.812 723.8 REFERRAL TO PHYSICAL THERAPY      4.  Hip pain  M25.559 719.45 REFERRAL TO PHYSICAL THERAPY      5. Pain of right hand  M79.641 729.5 REFERRAL TO PHYSICAL THERAPY      6. Bilateral carpal tunnel syndrome  G56.03 354.0 REFERRAL TO PHYSICAL THERAPY      7. Neuropathy  G62.9 355.9 gabapentin (NEURONTIN) 600 mg tablet        Follow-up and Dispositions    Return in about 3 months (around 12/26/2022).                ___________________________________________________    Attending Physician: Mervat Wilkinson MD                Pain in the back rt oing down to the leg  Numbness and tingling senst  no fall

## 2022-09-28 NOTE — PROGRESS NOTES
HISTORY OF PRESENT ILLNESS  Mario Hill is a 67 y.o. female. HPI  Last here 3/29/22. Pt is here to f/u on chronic conditions. Has a history of hypertension  BP today is 115/68  BP at [facility] is controlled, similar to today  Continues lotrel 10-20mg and chlorthalidone 25mg daily     Wt today is 132 lbs, stable since lov  Her wt is close to nl range      Reviewed labs   Ordered labs     Saw Dr. Armando Wayne (cardio) for her palpitations in the past  Last visit was 10/3/17  Her sx have since resolved      Follows with Dr. Zamzam Lion (neuro) for numbness and tingling s  Last visit 3/15/21  She later went to hand surgeon Dr. Tameka Chiu for carpal tunnel syndrome  She had EMG 11/20: did not show neuropathy in hand she will be seeing hand surgeon see above    Last visit w/ NP Lexy 8/18/22  Reviewed note:  1. Mild carpal tunnel syndrome of right wrist: She has been seen by Dr. Ed Ashton, EMG has been completed, and steroid injection has been completed she does have carpal tunnel splints, advised that she follow-up with her for further evaluation. 2. Small fiber neuropathy: Skin biopsy results reviewed with patient, can repeat in 6 to 12 months if any changes, continue gabapentin as prescribed  3. Cervical facet joint syndrome: Consider further evaluation if ongoing complaints into the right shoulder area, home exercise program encouraged. Monitor for worsening. Follow-up with Ortho. Last visit w/ Dr. Ning Lucas (neuro) 9/26/22 (no complete note in epic)  Takes gabapentin 600 mg TID for neuropathy, she was not taking this consistently out of concern for side effects, but restarted yesterday  He gave her decadron last week  Nerve biopsy was normal    She has been seeing Dr. Ed Ashton (ortho) for carpal tunnel syndrome Lov 9/16/22  Reviewed note:  Assessment:     1. Carpal tunnel syndrome on left     Plan:   Had a long discussion with her about nature condition and treatment options.  Have discussed with her that there is certainly other possible causes for her numbness of the hand due to incomplete relief with an injection and evidence on EMG of only very mild right carpal tunnel syndrome. Have discussed with her the possible treatment of surgical carpal tunnel release. Have discussed with her that this will likely not solve all the problems of numbness in the hand. She understands this. I have discussed risks benefits and alternatives as well as postoperative course. She would like to consider this. She will call back for surgical scheduling. She saw Dr. Ashley Thomason (ortho) for BL sciatica 5/5/22  Reviewed note:  Treatment Plan:    I discussed with Alonzo Chong in the office today that her symptoms are consistent with bilateral sciatica, in the setting of aggravation of pre-existing lumbosacral DDD and lumbar spondylosis. Lumbar spondylolisthesis is noted at L5-S1. No myelopathy is noted. Since making the appointment, symptoms have improved. We discussed x-rays of her lumbar spine performed here in the office today. No acute bony abnormalities are seen, the above degenerative changes are noted. Will proceed with conservative management at this time. Pt follows with Dr. Chinmay Olvera (derm)    Last visit was 4/17   Pt states she only f/u if she has a problem      Pt saw Dr Tanner Trujillo (vascular) for blood flow in legs   No swelling in legs currently   Last visit 3/19       Continues vit D OTC 2000U daily   Fosamax was started in 4/18, but pt stopped taking this after trying twice  Declines further eval /tx     Pt lives with her      ACP not on file. SDMs are [employer]  () or her daughter.    Provided information in the past.    Reviewed mammo 5/22: shanti Gabriel      PREVENTIVE:    Colonoscopy: 5/11/18 at [facility]  per pt 5 year repeat  Pap: 5/20 sandra no, will only f/u if has an issue   Mammogram: [facility] 5/22 negative  DEXA:  12/13/21 osteopenia, fosamax failed--declines further tx or eval   Tdap: 2/10/2016  Pneumovax: 21  Dbpgpar22: 18  Shingrix: declines  Flu shot:  10/22  A1c:  nl, 3/17 5.4  5.1  5.3  Eye exam: CLARISSA TORRES ([phone]), last week 3/22  Lipids:    EK17, T inversions in the anterior leads  Hep C screen: , negative  Covid: three doses [employer])      Patient Active Problem List    Diagnosis Date Noted    Chronic renal disease, stage III 2022    Osteopenia 2017    Essential hypertension 2016    Symptomatic menopausal or female climacteric states 10/25/2013    Bursitis of shoulder, right 2012    Vitiligo 2010     Current Outpatient Medications   Medication Sig Dispense Refill    gabapentin (NEURONTIN) 600 mg tablet Take 1 Tablet by mouth three (3) times daily. Max Daily Amount: 1,800 mg. For Trigeminal Neuralgia 90 Tablet 5    dexAMETHasone (DECADRON) 4 mg tablet Take 1 tab p.o. tid x3 days, 1 p.o. p.o. bid x4 days, 1 p.o. d x3 ays 20 Tablet 0    menthol (Biofreeze, menthol,) 4 % gel by Apply Externally route. amLODIPine-benazepril (LOTREL) 10-20 mg per capsule TAKE 1 CAPSULE EVERY DAY 90 Capsule 1    chlorthalidone (HYGROTON) 25 mg tablet Take 1 Tablet by mouth daily. 90 Tablet 1    MULTIVITAMIN PO Take  by mouth daily.        Past Surgical History:   Procedure Laterality Date    ENDOSCOPY, COLON, DIAGNOSTIC      hyperplastic polyps; 10 year repeat; Dr. Paul Gilbert    FOOT/TOES SURGERY PROC UNLISTED      Bone spurs removed R & L foot     HX HEENT  2013    cataract removal    HX Jerelyn Mends Dr. Chloe Felty TUBAL LIGATION        Lab Results   Component Value Date/Time    WBC 4.2 2021 12:14 PM    HGB 13.4 2021 12:14 PM    HCT 42.8 2021 12:14 PM    PLATELET 905  12:14 PM    MCV 97.1 2021 12:14 PM     Lab Results   Component Value Date/Time    Cholesterol, total 176 2021 12:14 PM    HDL Cholesterol 54 2021 12:14 PM    LDL, calculated 109.6 (H) 09/28/2021 12:14 PM    Triglyceride 62 09/28/2021 12:14 PM    CHOL/HDL Ratio 3.3 09/28/2021 12:14 PM     Lab Results   Component Value Date/Time    GFR est non-AA 58 (L) 03/29/2022 11:34 AM    GFR est AA >60 03/29/2022 11:34 AM    Creatinine 0.95 03/29/2022 11:34 AM    BUN 20 03/29/2022 11:34 AM    Sodium 139 03/29/2022 11:34 AM    Potassium 4.2 03/29/2022 11:34 AM    Chloride 106 03/29/2022 11:34 AM    CO2 29 03/29/2022 11:34 AM        Review of Systems   Respiratory:  Negative for shortness of breath. Cardiovascular:  Negative for chest pain. Physical Exam  Constitutional:       General: She is not in acute distress. Appearance: She is not ill-appearing, toxic-appearing or diaphoretic. HENT:      Head: Normocephalic and atraumatic. Right Ear: External ear normal.      Left Ear: External ear normal.   Eyes:      General:         Right eye: No discharge. Left eye: No discharge. Conjunctiva/sclera: Conjunctivae normal.      Pupils: Pupils are equal, round, and reactive to light. Cardiovascular:      Rate and Rhythm: Normal rate and regular rhythm. Heart sounds: No murmur heard. No friction rub. No gallop. Pulmonary:      Effort: No respiratory distress. Breath sounds: Normal breath sounds. No wheezing or rales. Chest:      Chest wall: No tenderness. Musculoskeletal:         General: Normal range of motion. Cervical back: Normal.      Right lower leg: Edema (trace) present. Left lower leg: Edema (trace) present. Skin:     General: Skin is warm and dry. Neurological:      Mental Status: She is oriented to person, place, and time. Mental status is at baseline. Coordination: Coordination normal.      Gait: Gait normal.   Psychiatric:         Mood and Affect: Mood normal.         Behavior: Behavior normal.       ASSESSMENT and PLAN    ICD-10-CM ICD-9-CM    1.  Essential hypertension  I10 401.9 LIPID PANEL      METABOLIC PANEL, COMPREHENSIVE Well-controlled on Lotrel and chlorthalidone continue check metabolic panel for K creatinine and sodium levels   HEMOGLOBIN A1C WITH EAG      TSH 3RD GENERATION      CBC W/O DIFF      2. Needs flu shot  Z23 V04.81 INFLUENZA, FLUAD, (AGE 72 Y+), IM, PF, 0.5 ML      LIPID PANEL      METABOLIC PANEL, COMPREHENSIVE      HEMOGLOBIN A1C WITH EAG      TSH 3RD GENERATION      CBC W/O DIFF      3. Stage 3a chronic kidney disease (HCC)  N18.31 585.3 LIPID PANEL      METABOLIC PANEL, COMPREHENSIVE      HEMOGLOBIN A1C WITH EAG   Kidney function actually has been normal we will recheck labs today   TSH 3RD GENERATION      CBC W/O DIFF      4. Neuropathy  G62.9 355.9 LIPID PANEL      METABOLIC PANEL, COMPREHENSIVE      HEMOGLOBIN A1C WITH EAG   Was treated with a round of Decadron has multiple neuropathy symptoms to include trigeminal neuralgia on the left side of her face sciatica and carpal tunnel syndrome symptoms    She is seeing Ortho for her back and her hands she is seeing neurology for the multitude of symptoms as well she had a muscle biopsy that was essentially negative    She just started a round of the Decadron and is now going to take higher dose gabapentin 600 mg 3 times daily as needed   TSH 3RD GENERATION      CBC W/O DIFF      5. Mixed hyperlipidemia  E78.2 272.2 LIPID PANEL      METABOLIC PANEL, COMPREHENSIVE      HEMOGLOBIN A1C WITH EAG   Diet controlled check lipids treat as needed   TSH 3RD GENERATION      CBC W/O DIFF      6. Osteopenia, unspecified location  M85.80 733.90 LIPID PANEL      METABOLIC PANEL, COMPREHENSIVE   Vitamin D for treatment bone density will be due next year however she failed Fosamax in the past and does not want to try any additional intervention if she does not want further treatment we will no longer get bone densities   HEMOGLOBIN A1C WITH EAG      TSH 3RD GENERATION      CBC W/O DIFF      7.  IFG (impaired fasting glucose)  R73.01 790.21 LIPID PANEL      METABOLIC PANEL, COMPREHENSIVE      HEMOGLOBIN A1C WITH EAG      TSH 3RD GENERATION   Check A1c diet controlled   CBC W/O DIFF        Depression screen reviewed and negative. Scribed by Radha Molina, as dictated by Dr. Branson Bumpers. Current diagnosis and concerns discussed with pt at length. Pt understands risks and benefits or current treatment plan and medications, and accepts the treatment and medication with any possible risks. Pt asks appropriate questions, which were answered. Pt was instructed to call with any concerns or problems. I have reviewed the note documented by the scribe. The services provided are my own. The documentation is accurate.

## 2022-10-03 ENCOUNTER — TELEPHONE (OUTPATIENT)
Dept: NEUROLOGY | Age: 72
End: 2022-10-03

## 2022-10-03 ENCOUNTER — OFFICE VISIT (OUTPATIENT)
Dept: INTERNAL MEDICINE CLINIC | Age: 72
End: 2022-10-03
Payer: MEDICARE

## 2022-10-03 VITALS
DIASTOLIC BLOOD PRESSURE: 68 MMHG | BODY MASS INDEX: 26 KG/M2 | HEIGHT: 60 IN | OXYGEN SATURATION: 99 % | HEART RATE: 66 BPM | TEMPERATURE: 97.8 F | RESPIRATION RATE: 18 BRPM | SYSTOLIC BLOOD PRESSURE: 115 MMHG | WEIGHT: 132.4 LBS

## 2022-10-03 DIAGNOSIS — R73.01 IFG (IMPAIRED FASTING GLUCOSE): ICD-10-CM

## 2022-10-03 DIAGNOSIS — I10 ESSENTIAL HYPERTENSION: Primary | ICD-10-CM

## 2022-10-03 DIAGNOSIS — N18.31 STAGE 3A CHRONIC KIDNEY DISEASE (HCC): ICD-10-CM

## 2022-10-03 DIAGNOSIS — Z23 NEEDS FLU SHOT: ICD-10-CM

## 2022-10-03 DIAGNOSIS — E78.2 MIXED HYPERLIPIDEMIA: ICD-10-CM

## 2022-10-03 DIAGNOSIS — G62.9 NEUROPATHY: ICD-10-CM

## 2022-10-03 DIAGNOSIS — M85.80 OSTEOPENIA, UNSPECIFIED LOCATION: ICD-10-CM

## 2022-10-03 PROCEDURE — G8399 PT W/DXA RESULTS DOCUMENT: HCPCS | Performed by: INTERNAL MEDICINE

## 2022-10-03 PROCEDURE — G8510 SCR DEP NEG, NO PLAN REQD: HCPCS | Performed by: INTERNAL MEDICINE

## 2022-10-03 PROCEDURE — G8752 SYS BP LESS 140: HCPCS | Performed by: INTERNAL MEDICINE

## 2022-10-03 PROCEDURE — G8427 DOCREV CUR MEDS BY ELIG CLIN: HCPCS | Performed by: INTERNAL MEDICINE

## 2022-10-03 PROCEDURE — G0008 ADMIN INFLUENZA VIRUS VAC: HCPCS | Performed by: INTERNAL MEDICINE

## 2022-10-03 PROCEDURE — G8417 CALC BMI ABV UP PARAM F/U: HCPCS | Performed by: INTERNAL MEDICINE

## 2022-10-03 PROCEDURE — 99214 OFFICE O/P EST MOD 30 MIN: CPT | Performed by: INTERNAL MEDICINE

## 2022-10-03 PROCEDURE — 1090F PRES/ABSN URINE INCON ASSESS: CPT | Performed by: INTERNAL MEDICINE

## 2022-10-03 PROCEDURE — 90694 VACC AIIV4 NO PRSRV 0.5ML IM: CPT | Performed by: INTERNAL MEDICINE

## 2022-10-03 PROCEDURE — G9899 SCRN MAM PERF RSLTS DOC: HCPCS | Performed by: INTERNAL MEDICINE

## 2022-10-03 PROCEDURE — G8536 NO DOC ELDER MAL SCRN: HCPCS | Performed by: INTERNAL MEDICINE

## 2022-10-03 PROCEDURE — 3017F COLORECTAL CA SCREEN DOC REV: CPT | Performed by: INTERNAL MEDICINE

## 2022-10-03 PROCEDURE — G8754 DIAS BP LESS 90: HCPCS | Performed by: INTERNAL MEDICINE

## 2022-10-03 PROCEDURE — 1101F PT FALLS ASSESS-DOCD LE1/YR: CPT | Performed by: INTERNAL MEDICINE

## 2022-10-03 NOTE — TELEPHONE ENCOUNTER
Pt states PT referral is too far away. Would like to do PT at [facility]. Can we revise referral and send out. Please call with questions to either number.

## 2022-10-03 NOTE — PROGRESS NOTES
Kevon Stanton is a 67 y.o. female  HIPAA verified by two patient identifiers. Chief Complaint   Patient presents with    Follow-up     6 month     Visit Vitals  /68   Pulse 66   Temp 97.8 °F (36.6 °C) (Temporal)   Resp 18   Ht 5' (1.524 m)   Wt 132 lb 6.4 oz (60.1 kg)   LMP 06/10/2006   SpO2 99%   BMI 25.86 kg/m²       Pain Scale: 00 - No pain/10  Pain Location:   1. Have you been to the ER, urgent care clinic since your last visit? Hospitalized since your last visit? No    2. Have you seen or consulted any other health care providers outside of the [address] [place] [address] since your last visit? Include any pap smears or colon screening.  No

## 2022-10-04 LAB
ALBUMIN SERPL-MCNC: 3.7 G/DL (ref 3.5–5)
ALBUMIN/GLOB SERPL: 1.1 {RATIO} (ref 1.1–2.2)
ALP SERPL-CCNC: 82 U/L (ref 45–117)
ALT SERPL-CCNC: 23 U/L (ref 12–78)
ANION GAP SERPL CALC-SCNC: 5 MMOL/L (ref 5–15)
AST SERPL-CCNC: 14 U/L (ref 15–37)
BILIRUB SERPL-MCNC: 0.5 MG/DL (ref 0.2–1)
BUN SERPL-MCNC: 20 MG/DL (ref 6–20)
BUN/CREAT SERPL: 18 (ref 12–20)
CALCIUM SERPL-MCNC: 9 MG/DL (ref 8.5–10.1)
CHLORIDE SERPL-SCNC: 103 MMOL/L (ref 97–108)
CHOLEST SERPL-MCNC: 212 MG/DL
CO2 SERPL-SCNC: 32 MMOL/L (ref 21–32)
CREAT SERPL-MCNC: 1.13 MG/DL (ref 0.55–1.02)
ERYTHROCYTE [DISTWIDTH] IN BLOOD BY AUTOMATED COUNT: 12.6 % (ref 11.5–14.5)
EST. AVERAGE GLUCOSE BLD GHB EST-MCNC: 103 MG/DL
GLOBULIN SER CALC-MCNC: 3.5 G/DL (ref 2–4)
GLUCOSE SERPL-MCNC: 83 MG/DL (ref 65–100)
HBA1C MFR BLD: 5.2 % (ref 4–5.6)
HCT VFR BLD AUTO: 45.2 % (ref 35–47)
HDLC SERPL-MCNC: 61 MG/DL
HDLC SERPL: 3.5 {RATIO} (ref 0–5)
HGB BLD-MCNC: 14.3 G/DL (ref 11.5–16)
LDLC SERPL CALC-MCNC: 138.4 MG/DL (ref 0–100)
MCH RBC QN AUTO: 31 PG (ref 26–34)
MCHC RBC AUTO-ENTMCNC: 31.6 G/DL (ref 30–36.5)
MCV RBC AUTO: 97.8 FL (ref 80–99)
NRBC # BLD: 0 K/UL (ref 0–0.01)
NRBC BLD-RTO: 0 PER 100 WBC
PLATELET # BLD AUTO: 225 K/UL (ref 150–400)
PMV BLD AUTO: 12.6 FL (ref 8.9–12.9)
POTASSIUM SERPL-SCNC: 3.3 MMOL/L (ref 3.5–5.1)
PROT SERPL-MCNC: 7.2 G/DL (ref 6.4–8.2)
RBC # BLD AUTO: 4.62 M/UL (ref 3.8–5.2)
SODIUM SERPL-SCNC: 140 MMOL/L (ref 136–145)
TRIGL SERPL-MCNC: 63 MG/DL (ref ?–150)
TSH SERPL DL<=0.05 MIU/L-ACNC: 1.73 UIU/ML (ref 0.36–3.74)
VLDLC SERPL CALC-MCNC: 12.6 MG/DL
WBC # BLD AUTO: 4.2 K/UL (ref 3.6–11)

## 2022-10-04 NOTE — PROGRESS NOTES
Please call patient back about results  Potassium is low on labs start Klor-Con 20 M EQ's daily repeat BMP in 4 weeks  Work on diet for cholesterol no medication needed for now  Schedule 6-month follow-up

## 2022-10-06 ENCOUNTER — TELEPHONE (OUTPATIENT)
Dept: NEUROLOGY | Age: 72
End: 2022-10-06

## 2022-10-06 DIAGNOSIS — E87.6 LOW BLOOD POTASSIUM: Primary | ICD-10-CM

## 2022-10-06 RX ORDER — POTASSIUM CHLORIDE 20 MEQ/1
20 TABLET, EXTENDED RELEASE ORAL DAILY
Qty: 90 TABLET | Refills: 0 | Status: SHIPPED | OUTPATIENT
Start: 2022-10-06

## 2022-10-06 NOTE — TELEPHONE ENCOUNTER
Patient called to speak with Nurse regarding her physical therapy. Stated she would like our help scheduling. Please advise.   [phone]

## 2022-10-06 NOTE — TELEPHONE ENCOUNTER
Called patient verified  I explained to her that we do not schedule PT appointments she stated that DR Arenas schedule an appointment for her but she does not want to go that far I suggested she call them and find one closer to her she stated that she would

## 2022-10-06 NOTE — TELEPHONE ENCOUNTER
Future Appointments:  Future Appointments   Date Time Provider Ravindra Mar   12/27/2022  1:00 PM Lori Pugh, DEMI [facility]UM BS AMB        Last Appointment With Me:  10/3/2022     Requested Prescriptions     Pending Prescriptions Disp Refills    potassium chloride (K-DUR, KLOR-CON M20) 20 mEq tablet 90 Tablet 0     Sig: Take 1 Tablet by mouth two (2) times a day.

## 2022-10-06 NOTE — PROGRESS NOTES
Called, spoke to pt  Received two pt identifiers   Pt informed per Dr. Chris Ryan Potassium is low on labs start Klor-Con 21 M EQ's daily repeat BMP in 4 weeks----pended and ordered  Pt informed per Dr. Chris Ryan Work on diet for cholesterol no medication needed for now  Pt states will callback to make 6 month f/u  Pt verbalizes understanding of the instructions and has no further questions at this time.

## 2022-10-11 ENCOUNTER — TELEPHONE (OUTPATIENT)
Dept: NEUROLOGY | Age: 72
End: 2022-10-11

## 2022-10-11 NOTE — TELEPHONE ENCOUNTER
Voicemail:    Patient called to ask if she can get handicap stickers for her license plate.   [phone]

## 2022-10-12 ENCOUNTER — HOSPITAL ENCOUNTER (OUTPATIENT)
Dept: PHYSICAL THERAPY | Age: 72
Discharge: HOME | End: 2022-10-12
Payer: MEDICARE

## 2022-10-12 PROCEDURE — 97162 PT EVAL MOD COMPLEX 30 MIN: CPT

## 2022-10-12 NOTE — THERAPY EVALUATION
Physical Therapy at [facility],   a part of  [facility]  [facility]  [facility], [address]  Phone: [phone]  Fax: [phone]    Plan of Care/Statement of Necessity for Physical Therapy Services  2-15    Patient name: Marjorie Cruz  : 1950  Provider#: [id number]  Referral source: Lexie Kohler MD      Medical/Treatment Diagnosis: Neck pain [M54.2]  Pain in right hand [M79.641]     Prior Hospitalization: see medical history     Comorbidities: HTN, vitiligo, neuropathy   Prior Level of Function: Able to feel, , and lift with both hands   Medications: Verified on Patient Summary List  Start of Care: 10/12/22      Onset Date: ~   The Plan of Care and following information is based on the information from the initial evaluation. Assessment/ key information: Patient is a pleasant 67year old female presenting with B wrist paresthesia, sx suggestive of cervical radicular origin. She presents with negative Phalen's and Tinnel's tests at the carpal tunnel, positive relief from cervical testing and shoulder abduction test.   She additionally reports R sided low back and leg pain, due to time constraints this will be evaluated at a future visit with initial focus of therapy on B hand numbness. Current symptoms limit functional ability to sleep through the night, carry groceries or write. Patient will benefit from skilled PT to address all previously listed deficits. Evaluation Complexity History MEDIUM  Complexity : 1-2 comorbidities / personal factors will impact the outcome/ POC ; Examination MEDIUM Complexity : 3 Standardized tests and measures addressing body structure, function, activity limitation and / or participation in recreation  ;Presentation MEDIUM Complexity : Evolving with changing characteristics  ; Clinical Decision Making MEDIUM Complexity : FOTO score of 26-74  Overall Complexity Rating: MEDIUM    Problem List: pain affecting function, decrease ROM, decrease strength, decrease ADL/ functional abilitiies, and decrease flexibility/ joint mobility   Treatment Plan may include any combination of the following: Therapeutic exercise, Neuromuscular reeducation, Manual therapy, Therapeutic activity, Self care/home management, Electric stim unattended , Vasopneumatic device, Gait training, Ultrasound, Mechanical traction, and Electric stim attended  Patient / Family readiness to learn indicated by: asking questions, trying to perform skills, and interest  Persons(s) to be included in education: patient (P)  Barriers to Learning/Limitations: None  Patient Goal (s): Get rid of the numbness  Patient Self Reported Health Status: good  Rehabilitation Potential: good    Short Term Goals: To be accomplished in 4 weeks:  Patient will be independent with initial HEP in order to transition to general wellness program.  Patient will report 25% improvement in paresthesia to increase QOL. Long Term Goals: To be accomplished in 12 weeks:  Patient will report worst pain no greater than 2/10 to increase QOL. Patient will report 75% improvement in paresthesia to ease cooking. Patient will demonstrate gross cervical AROM WNL to ease driving. Frequency / Duration: Patient would benefit from skilled services 2x/week but cites financial restrictions and plans to be seen 1x/week or 1x/2 weeks for up to 12 weeks. Patient/ Caregiver education and instruction: self care, activity modification, brace/ splint application, and exercises    [x]  Plan of care has been reviewed with PTA        Certification Period: 10/12/22-1/12/23  Rober Larsen DPT 10/12/2022     ________________________________________________________________________    I certify that the above Therapy Services are being furnished while the patient is under my care. I agree with the treatment plan and certify that this therapy is necessary.     [facility] Signature:____________________  Date:____________Time: _________         Eulice Shells, MD

## 2022-10-12 NOTE — PROGRESS NOTES
PT INITIAL EVALUATION NOTE - [facility] 2-15    Patient Name: Agustin Camp  Date:10/12/2022  : 1950  [x]  Patient  Verified  Payor: [employer]ier [employer] / Plan: [address] [place] [address] HMO / Product Type: Managed Care Medicare /    In time: 11:00 a  Out time: 11:50 a  Total Treatment Time (min): 50  Total Timed Codes (min): 5  1:1 Treatment Time ( only): 5   Visit #: 1     Treatment Area: Neck pain [M54.2]  Pain in right hand [M79.641]    SUBJECTIVE  Pain Level (0-10 scale): Current- 0- numb     Any medication changes, allergies to medications, adverse drug reactions, diagnosis change, or new procedure performed?: [] No    [x] Yes (see summary sheet for update)  Subjective:    Chief complaint: Numb fingers, and R leg/back is hurting in her knee. She reports that the knee pain began a few weeks ago. Numbness in the hands has been for a few years. She has received steroid shots in her wrists which she does not believe was helpful. She has been advised against a CT release surgery as it would unlikely resolve the issue. She has braces for her CTS but she no longer wears them. Her hands can be painful but if she rests them they will feel okay. Aggravating factors: Laying down, picking up objects    Numbness/tingling: B hands and B feet   PLOF: Able to feel, , and lift with both hands   Mechanism of Injury: Insidious onset   Previous Treatment/Compliance: Initial encounter for PT  PMHx/Surgical Hx: HTN, vitiligo, neuropathy   Work Hx: Retired   Living Situation: No restrictions with home set up noted   Pt Goals:  \"Get rid of the numbness\"  Barriers: Chronicity   Motivation: Motivated   Substance use: None noted   Cognition: A & O x 4       OBJECTIVE/EXAMINATION  Palpation: No TTP noted     Cervical AROM: Mod restriction B SB, no impact on hand numbness     Upper Extremity AROM: WNL, tightness and stretching with ER             MMT:      R  L  Elbow flexion   4+  4  Elbow extension   4  4  Elbow Supination  5  5  Elbow Pronation  4  4  Wrist extension   4  4  Wrist flexion    3+  4         45#  42#    Flexibility (restriction):      R  L  Wrist extensors  Min  Min  Wrist flexors    Min  Min      Sensation: Diminished sensation through all fingers      Median ULTT: positive on R     Special Tests:   Cervical Compression: negative- relief of numbness     Spurling's Test: negative- relief of numbness    Shoulder Abduction: positive    Phalen's: negative    Tinel's:  negative at CT          5 min Therapeutic Exercise:  [x] See flow sheet :   Rationale: increase ROM to improve the patients ability to open jars, sleep, and perform ADL's            With   [] TE   [] TA   [] Neuro   [] SC   [] other: Patient Education: [x] Review HEP    [] Progressed/Changed HEP based on:   [] positioning   [] body mechanics   [] transfers   [] heat/ice application    [] other:      Other Objective/Functional Measures: FOTO Functional Measure: 69/100               Pain Level (0-10 scale) post treatment: \"that feels better and looser\"    ASSESSMENT/Changes in Function:     [x]  See Plan of Breanna Joseph 27, DPT 10/12/2022

## 2022-10-17 NOTE — TELEPHONE ENCOUNTER
Patient informed she also states will not bee able to keep the neurology appointment in [place] due to cost of driving.( Ask she call to cancel) See the request was sent to [facility].

## 2022-10-17 NOTE — TELEPHONE ENCOUNTER
Edwar Corrales MD 11 days ago     Juancho Tyler 1122  Received: 4 days ago  Dennie Hamman, MD sent to [facility]  Caller: Unspecified (1 week ago)  That is what she should do       Noted. Will close out since pt is contacting her insurance as to who she may see.

## 2022-10-19 ENCOUNTER — APPOINTMENT (OUTPATIENT)
Dept: PHYSICAL THERAPY | Age: 72
End: 2022-10-19
Payer: MEDICARE

## 2022-10-20 ENCOUNTER — LAB ONLY (OUTPATIENT)
Dept: INTERNAL MEDICINE CLINIC | Age: 72
End: 2022-10-20

## 2022-10-20 DIAGNOSIS — E87.6 LOW BLOOD POTASSIUM: ICD-10-CM

## 2022-10-20 LAB
ANION GAP SERPL CALC-SCNC: 3 MMOL/L (ref 5–15)
BUN SERPL-MCNC: 22 MG/DL (ref 6–20)
BUN/CREAT SERPL: 19 (ref 12–20)
CALCIUM SERPL-MCNC: 9.7 MG/DL (ref 8.5–10.1)
CHLORIDE SERPL-SCNC: 105 MMOL/L (ref 97–108)
CO2 SERPL-SCNC: 31 MMOL/L (ref 21–32)
CREAT SERPL-MCNC: 1.13 MG/DL (ref 0.55–1.02)
GLUCOSE SERPL-MCNC: 89 MG/DL (ref 65–100)
POTASSIUM SERPL-SCNC: 3.9 MMOL/L (ref 3.5–5.1)
SODIUM SERPL-SCNC: 139 MMOL/L (ref 136–145)

## 2022-11-15 DIAGNOSIS — I10 ESSENTIAL HYPERTENSION: ICD-10-CM

## 2022-11-16 ENCOUNTER — HOSPITAL ENCOUNTER (OUTPATIENT)
Dept: GENERAL RADIOLOGY | Age: 72
Discharge: HOME | End: 2022-11-16

## 2022-11-16 ENCOUNTER — OFFICE VISIT (OUTPATIENT)
Dept: INTERNAL MEDICINE CLINIC | Age: 72
End: 2022-11-16
Payer: MEDICARE

## 2022-11-16 ENCOUNTER — TELEPHONE (OUTPATIENT)
Dept: INTERNAL MEDICINE CLINIC | Age: 72
End: 2022-11-16

## 2022-11-16 VITALS
OXYGEN SATURATION: 96 % | WEIGHT: 133.2 LBS | BODY MASS INDEX: 26.15 KG/M2 | HEIGHT: 60 IN | TEMPERATURE: 98.6 F | HEART RATE: 80 BPM | DIASTOLIC BLOOD PRESSURE: 64 MMHG | RESPIRATION RATE: 16 BRPM | SYSTOLIC BLOOD PRESSURE: 111 MMHG

## 2022-11-16 DIAGNOSIS — M25.572 ACUTE LEFT ANKLE PAIN: ICD-10-CM

## 2022-11-16 DIAGNOSIS — I10 ESSENTIAL HYPERTENSION: Primary | ICD-10-CM

## 2022-11-16 PROCEDURE — G8754 DIAS BP LESS 90: HCPCS | Performed by: INTERNAL MEDICINE

## 2022-11-16 PROCEDURE — 3017F COLORECTAL CA SCREEN DOC REV: CPT | Performed by: INTERNAL MEDICINE

## 2022-11-16 PROCEDURE — G9899 SCRN MAM PERF RSLTS DOC: HCPCS | Performed by: INTERNAL MEDICINE

## 2022-11-16 PROCEDURE — G8752 SYS BP LESS 140: HCPCS | Performed by: INTERNAL MEDICINE

## 2022-11-16 PROCEDURE — G8536 NO DOC ELDER MAL SCRN: HCPCS | Performed by: INTERNAL MEDICINE

## 2022-11-16 PROCEDURE — G8417 CALC BMI ABV UP PARAM F/U: HCPCS | Performed by: INTERNAL MEDICINE

## 2022-11-16 PROCEDURE — 99213 OFFICE O/P EST LOW 20 MIN: CPT | Performed by: INTERNAL MEDICINE

## 2022-11-16 PROCEDURE — G8399 PT W/DXA RESULTS DOCUMENT: HCPCS | Performed by: INTERNAL MEDICINE

## 2022-11-16 PROCEDURE — G8510 SCR DEP NEG, NO PLAN REQD: HCPCS | Performed by: INTERNAL MEDICINE

## 2022-11-16 PROCEDURE — 1101F PT FALLS ASSESS-DOCD LE1/YR: CPT | Performed by: INTERNAL MEDICINE

## 2022-11-16 PROCEDURE — G8427 DOCREV CUR MEDS BY ELIG CLIN: HCPCS | Performed by: INTERNAL MEDICINE

## 2022-11-16 PROCEDURE — 1090F PRES/ABSN URINE INCON ASSESS: CPT | Performed by: INTERNAL MEDICINE

## 2022-11-16 RX ORDER — PREDNISONE 20 MG/1
TABLET ORAL
Qty: 12 TABLET | Refills: 0 | Status: SHIPPED | OUTPATIENT
Start: 2022-11-16

## 2022-11-16 RX ORDER — DICLOFENAC POTASSIUM 50 MG/1
50 TABLET, FILM COATED ORAL 3 TIMES DAILY
COMMUNITY

## 2022-11-16 NOTE — PROGRESS NOTES
HISTORY OF PRESENT ILLNESS  Cynthia Rodríguez is a 67 y.o. female. HPI  Last here 10/3/22. Here for acute care. Pt c/o L ankle pain and swelling x 11 days that began while she was out in the yard. Denies acute injury. She went to [facility] 11/9/22, CBC was normal and uric acid level was 7.7. They weren't sure if it was gout or cellulitis, so she was treated w/ bactrim and diclofenac. Pt states pain is still present while walking, though rest and laying down does help. Redness is gone. Denies fever, chills. Discussed this is more likely gout than an infection. Ordered XR L foot and ankle. Rx'd prednisone and advised pt to rest ankle for at least another week. Advised pt to call back w/ any new redness or swelling. BP at [facility]: 122/64  BP today is 111/64  Patient Active Problem List    Diagnosis Date Noted    Chronic renal disease, stage III 05/23/2022    Osteopenia 03/22/2017    Essential hypertension 04/06/2016    Symptomatic menopausal or female climacteric states 10/25/2013    Bursitis of shoulder, right 01/05/2012    Vitiligo 05/13/2010     Current Outpatient Medications   Medication Sig Dispense Refill    diclofenac potassium (CATAFLAM) 50 mg tablet Take 50 mg by mouth three (3) times daily. SULFAMETHOXAZOLE PO Take  by mouth.      gabapentin (NEURONTIN) 100 mg capsule Take 1 Capsule by mouth three (3) times daily. Max Daily Amount: 300 mg. 270 Capsule 1    potassium chloride (K-DUR, KLOR-CON M20) 20 mEq tablet Take 1 Tablet by mouth daily. 90 Tablet 0    gabapentin (NEURONTIN) 600 mg tablet Take 1 Tablet by mouth three (3) times daily. Max Daily Amount: 1,800 mg. For Trigeminal Neuralgia 90 Tablet 5    dexAMETHasone (DECADRON) 4 mg tablet Take 1 tab p.o. tid x3 days, 1 p.o. p.o. bid x4 days, 1 p.o. d x3 ays (Patient not taking: Reported on 10/3/2022) 20 Tablet 0    menthol (Biofreeze, menthol,) 4 % gel by Apply Externally route.       amLODIPine-benazepril (LOTREL) 10-20 mg per capsule TAKE 1 CAPSULE EVERY DAY 90 Capsule 1    chlorthalidone (HYGROTON) 25 mg tablet Take 1 Tablet by mouth daily. 90 Tablet 1    MULTIVITAMIN PO Take  by mouth daily. Past Surgical History:   Procedure Laterality Date    ENDOSCOPY, COLON, DIAGNOSTIC  2003    hyperplastic polyps; 10 year repeat; Dr. Dayanara Hollis    FOOT/TOES SURGERY PROC UNLISTED      Bone spurs removed R & L foot     HX HEENT  03/2013    cataract removal    HX Garima Zuniga TUBAL LIGATION        Lab Results   Component Value Date/Time    WBC 4.2 10/03/2022 12:23 PM    HGB 14.3 10/03/2022 12:23 PM    HCT 45.2 10/03/2022 12:23 PM    PLATELET 008 59/39/4295 12:23 PM    MCV 97.8 10/03/2022 12:23 PM     Lab Results   Component Value Date/Time    Cholesterol, total 212 (H) 10/03/2022 12:23 PM    HDL Cholesterol 61 10/03/2022 12:23 PM    LDL, calculated 138.4 (H) 10/03/2022 12:23 PM    Triglyceride 63 10/03/2022 12:23 PM    CHOL/HDL Ratio 3.5 10/03/2022 12:23 PM     Lab Results   Component Value Date/Time    GFR est non-AA 58 (L) 03/29/2022 11:34 AM    GFR est AA >60 03/29/2022 11:34 AM    Creatinine 1.13 (H) 10/20/2022 11:00 AM    BUN 22 (H) 10/20/2022 11:00 AM    Sodium 139 10/20/2022 11:00 AM    Potassium 3.9 10/20/2022 11:00 AM    Chloride 105 10/20/2022 11:00 AM    CO2 31 10/20/2022 11:00 AM        Review of Systems   Respiratory:  Negative for shortness of breath. Cardiovascular:  Negative for chest pain. Musculoskeletal:  Positive for myalgias (L ankle and foot). Physical Exam  Constitutional:       General: She is not in acute distress. Appearance: She is not ill-appearing, toxic-appearing or diaphoretic. HENT:      Head: Normocephalic and atraumatic. Right Ear: External ear normal.      Left Ear: External ear normal.   Eyes:      General:         Right eye: No discharge. Left eye: No discharge.       Conjunctiva/sclera: Conjunctivae normal.      Pupils: Pupils are equal, round, and reactive to light. Cardiovascular:      Rate and Rhythm: Normal rate and regular rhythm. Heart sounds: No murmur heard. No friction rub. No gallop. Pulmonary:      Effort: No respiratory distress. Breath sounds: Normal breath sounds. No wheezing or rales. Chest:      Chest wall: No tenderness. Musculoskeletal:         General: Swelling (L lateral ankle) and tenderness (back of L ankle) present. Normal range of motion. Cervical back: Normal.   Skin:     General: Skin is warm and dry. Findings: No erythema (L foot). Comments: Slightly darker hue to skin on L foot   Neurological:      Mental Status: She is oriented to person, place, and time. Mental status is at baseline. Coordination: Coordination normal.      Gait: Gait normal.   Psychiatric:         Mood and Affect: Mood normal.         Behavior: Behavior normal.       ASSESSMENT and PLAN    ICD-10-CM ICD-9-CM    1. Essential hypertension  I10 401.9    Well-controlled on Lotrel and chlorthalidone   2. Acute left ankle pain  M25.572 719.47 XR ANKLE LT MIN 3 V   Patient with left ankle tenderness to palpation acutely on the lateral side    There is edema as well    I do not see evidence of cellulitis at this time she is currently on Bactrim and will complete a course of this    More likely gout did have mild elevation in uric acid levels her CBC have been normal    Potentially could be a sprain though she denies any injury    We will get plain film of the left ankle    We will treat with prednisone taper    Discussed reasons to call the office any new redness new swelling any fevers    Discussed keeping leg and ankle elevated trying to keep off of it she is been walking around on the ankle since her diagnosis and has been slow to improve because of this     Depression screen reviewed and negative. Scribed by Cathy Gilliland, as dictated by Dr. Sage Canela. Current diagnosis and concerns discussed with pt at length. Pt understands risks and benefits or current treatment plan and medications, and accepts the treatment and medication with any possible risks. Pt asks appropriate questions, which were answered. Pt was instructed to call with any concerns or problems. I have reviewed the note documented by the scribe. The services provided are my own. The documentation is accurate.

## 2022-11-16 NOTE — TELEPHONE ENCOUNTER
#[phone] try this number 1st   #[phone]  this number 2 nd     Pt states that she went to [facility] on 11-9-22 with left ankle swelling. Pt has gout. Pt was prescribed two medications and is about out. Her ankle is still swollen and still hurts. Pt wants to know if there is something otc that she can take or use? Should she get crutches? Please call pt as soon as you can to advise due to her pain level. Thanks.        Medications prescribed: Diclofenac sodium 50 mg, Sulfamethoxazole 800 mg

## 2022-11-16 NOTE — TELEPHONE ENCOUNTER
Called, spoke to pt  Received two pt identifiers  Pt states swelling and pain in left ankle. Pt states swelling started on 11/6/22 with swelling and then pain started this week. Advised pt will send message to Dr. Marty Baker  Pt verbalizes understanding of the instructions and has no further questions at this time.

## 2022-11-17 ENCOUNTER — HOSPITAL ENCOUNTER (OUTPATIENT)
Dept: GENERAL RADIOLOGY | Age: 72
Discharge: HOME | End: 2022-11-17
Payer: MEDICARE

## 2022-11-17 PROCEDURE — 73610 X-RAY EXAM OF ANKLE: CPT

## 2022-11-21 RX ORDER — CHLORTHALIDONE 25 MG/1
25 TABLET ORAL DAILY
Qty: 90 TABLET | Refills: 1 | Status: SHIPPED | OUTPATIENT
Start: 2022-11-21

## 2022-11-21 RX ORDER — AMLODIPINE AND BENAZEPRIL HYDROCHLORIDE 10; 20 MG/1; MG/1
CAPSULE ORAL
Qty: 90 CAPSULE | Refills: 1 | Status: SHIPPED | OUTPATIENT
Start: 2022-11-21

## 2022-11-21 NOTE — TELEPHONE ENCOUNTER
Future Appointments:  Future Appointments   Date Time Provider Ravindra Manuel   12/27/2022  1:00 PM DEMI Mendes BS AMB   4/11/2023 10:00 AM Ramesh Jauregui MD [facility] BS AMB        Last Appointment With Me:  10/3/2022     Requested Prescriptions     Pending Prescriptions Disp Refills    chlorthalidone (HYGROTON) 25 mg tablet 90 Tablet 1     Sig: Take 1 Tablet by mouth daily.     amLODIPine-benazepril (LOTREL) 10-20 mg per capsule 90 Capsule 1     Sig: TAKE 1 CAPSULE EVERY DAY

## 2022-11-30 ENCOUNTER — TELEPHONE (OUTPATIENT)
Dept: NEUROLOGY | Age: 72
End: 2022-11-30

## 2022-11-30 NOTE — TELEPHONE ENCOUNTER
Pt called about cx'ed 12/27 appt. Tried to get her r/s for march. Pt would not reschedule. Stated she needs to be seen before that because she is having trouble with her feet. I could not find anything available before then. Please check if Elmer Infante can squeeze her in somewhere.  [phone]

## 2022-12-07 ENCOUNTER — OFFICE VISIT (OUTPATIENT)
Dept: NEUROLOGY | Age: 72
End: 2022-12-07
Payer: MEDICARE

## 2022-12-07 VITALS
HEART RATE: 78 BPM | DIASTOLIC BLOOD PRESSURE: 70 MMHG | BODY MASS INDEX: 26.19 KG/M2 | RESPIRATION RATE: 16 BRPM | OXYGEN SATURATION: 98 % | SYSTOLIC BLOOD PRESSURE: 120 MMHG | WEIGHT: 133.4 LBS | TEMPERATURE: 97.6 F | HEIGHT: 60 IN

## 2022-12-07 DIAGNOSIS — R20.2 PARESTHESIA: ICD-10-CM

## 2022-12-07 DIAGNOSIS — E53.8 VITAMIN B 12 DEFICIENCY: ICD-10-CM

## 2022-12-07 DIAGNOSIS — R20.0 NUMBNESS IN BOTH HANDS: ICD-10-CM

## 2022-12-07 DIAGNOSIS — M47.816 LUMBAR SPONDYLOSIS: Primary | ICD-10-CM

## 2022-12-07 PROCEDURE — 1123F ACP DISCUSS/DSCN MKR DOCD: CPT | Performed by: NURSE PRACTITIONER

## 2022-12-07 PROCEDURE — 99213 OFFICE O/P EST LOW 20 MIN: CPT | Performed by: NURSE PRACTITIONER

## 2022-12-07 PROCEDURE — 3074F SYST BP LT 130 MM HG: CPT | Performed by: NURSE PRACTITIONER

## 2022-12-07 PROCEDURE — 3078F DIAST BP <80 MM HG: CPT | Performed by: NURSE PRACTITIONER

## 2022-12-07 NOTE — PROGRESS NOTES
Chief Complaint   Patient presents with    Follow-up      Numbness in extremities       1. Have you been to the ER, urgent care clinic since your last visit? Yes  due to gout left foot Hospitalized since your last visit? No     2. Have you seen or consulted any other health care providers outside of the [address] [place] [address] since your last visit? NO   Include any pap smears or colon screening.  NO

## 2022-12-07 NOTE — PROGRESS NOTES
[facility]  [facility] [address] [place] [address]  [place]  Tel: [phone]  Fax: [phone]      Date:  22     Name:  Sandra Horan  :  1950  MRN:  [id number]     PCP:  Cesia Leo MD    Chief Complaint   Patient presents with    Follow-up      Numbness in extremities         HISTORY OF PRESENT ILLNESS:  Patient presents today for follow up for her feet. She continues complain of numbness in her hands. Saw her PCP, MAURO Talamantes ankle was painful and swollen. Bough some arch supports to put in her shoes and it seems to be helping. Slowly coming along. It doesn't bother her to walk anymore. Once she starts moving around it is better. Her hips and her right leg hurts and her hands and arms are still tingling, had seen Ortho and they recommended PT. Skin Biopsy normal.  EMG 2020 Normal.  EMG completed earlier this year by orthopedic, Dr. Holger Guerra noted a mild right carpal tunnel. Recap from last visit 2022: 1. Mild carpal tunnel syndrome of right wrist: She has been seen by Dr. Holger Guerra, EMG has been completed, and steroid injection has been completed she does have carpal tunnel splints, advised that she follow-up with her for further evaluation. 2. Small fiber neuropathy: Skin biopsy results reviewed with patient, can repeat in 6 to 12 months if any changes, continue gabapentin as prescribed  3. Cervical facet joint syndrome: Consider further evaluation if ongoing complaints into the right shoulder area, home exercise program encouraged. Monitor for worsening. Follow-up with Ortho. REVIEW OF SYSTEMS:     Review of Systems   Musculoskeletal:  Positive for joint pain. Negative for falls. Neurological:  Positive for sensory change (Bilateral hands, her feet a little bit.). Negative for dizziness, tingling, tremors and weakness. Psychiatric/Behavioral:  Negative for depression.  The patient is not nervous/anxious and does not have insomnia. All other systems reviewed and are negative. Current Outpatient Medications   Medication Sig    chlorthalidone (HYGROTON) 25 mg tablet Take 1 Tablet by mouth daily. amLODIPine-benazepril (LOTREL) 10-20 mg per capsule TAKE 1 CAPSULE EVERY DAY    diclofenac potassium (CATAFLAM) 50 mg tablet Take 50 mg by mouth three (3) times daily. gabapentin (NEURONTIN) 100 mg capsule Take 1 Capsule by mouth three (3) times daily. Max Daily Amount: 300 mg. (Patient taking differently: Take 300 mg by mouth three (3) times daily.)    menthol (Biofreeze, menthol,) 4 % gel by Apply Externally route. MULTIVITAMIN PO Take  by mouth daily. SULFAMETHOXAZOLE PO Take  by mouth.    potassium chloride (K-DUR, KLOR-CON M20) 20 mEq tablet Take 1 Tablet by mouth daily. (Patient not taking: Reported on 12/7/2022)     No current facility-administered medications for this visit. Allergies   Allergen Reactions    Tramadol Nausea Only     Past Medical History:   Diagnosis Date    Gout 11/2022    HTN (hypertension) 05/13/2010    Neuropathy     Vitiligo 05/13/2010    ALEXUS Lau     Past Surgical History:   Procedure Laterality Date    ENDOSCOPY, COLON, DIAGNOSTIC  2003    hyperplastic polyps; 10 year repeat; Dr. Shreyas Escamilla    FOOT/TOES SURGERY PROC UNLISTED      Bone spurs removed R & L foot     HX HEENT  03/2013    cataract removal    HX Cyndi Slice    Dr. Iván Anderson TUBAL LIGATION       Social History     Socioeconomic History    Marital status:      Spouse name: Not on file    Number of children: Not on file    Years of education: Not on file    Highest education level: Not on file   Occupational History    Not on file   Tobacco Use    Smoking status: Never    Smokeless tobacco: Never   Vaping Use    Vaping Use: Never used   Substance and Sexual Activity    Alcohol use:  Yes     Alcohol/week: 1.0 standard drink     Types: 1 Cans of beer per week     Comment: rarely    Drug use: No    Sexual activity: Not Currently     Partners: Male     Birth control/protection: Surgical   Other Topics Concern    Not on file   Social History Narrative    Not on file     Social Determinants of Health     Financial Resource Strain: Low Risk     Difficulty of Paying Living Expenses: Not very hard   Food Insecurity: No Food Insecurity    Worried About Running Out of Food in the Last Year: Never true    Ran Out of Food in the Last Year: Never true   Transportation Needs: Not on file   Physical Activity: Not on file   Stress: Not on file   Social Connections: Not on file   Intimate Partner Violence: Not on file   Housing Stability: Not on file     Family History   Problem Relation Age of Onset    Hypertension Sister     Stroke Sister 61    Diabetes Sister     Tuberculosis Sister     Stroke Mother 80        TIA    Cancer Father         Lung cancer         PHYSICAL EXAMINATION:    Visit Vitals  /70 (BP 1 Location: Left upper arm, BP Patient Position: Sitting, BP Cuff Size: Adult)   Pulse 78   Temp 97.6 °F (36.4 °C) (Temporal)   Resp 16   Ht 5' (1.524 m)   Wt 133 lb 6.4 oz (60.5 kg)   SpO2 98%   BMI 26.05 kg/m²     General:  Well defined, nourished, and well groomed individual in no acute distress. Neck: Supple, nontender, normal range of motion. Musculoskeletal:  Extremities revealed no edema and had full range of motion of joints. Psych:  Good mood and bright affect. NEUROLOGICAL EXAMINATION:     Mental Status:   Alert and oriented to person, place, and time with recent and remote memory intact. Attention span and concentration are normal. Clear speech. Fund of knowledge preserved. Cranial Nerves:   PERRLA. Visual fields were full  EOM: no evidence of nystagmus  Facial sensation:  normal and symmetric  Facial motor: normal and symmetric, no facial droop noted. Hearing intact  SCM strength intact  Tongue: midline without fasciculations    Motor Examination: Normal tone.  5/5 muscle strength in bilateral upper extremities. No cogwheel rigidity. No muscle wasting, no twitching or fasciculation noted. Sensory exam:  Normal throughout to light touch in bilateral upper extremities. Negative Tinel's to bilateral wrist, negative Tinel's bilateral elbows. Coordination:   Finger to nose and rapid arm movement testing was normal.  No resting or intention tremor. Negative Romberg, negative pronator drift. Gait and Station:  Steady, did fairly well with tandem walking. Normal arm swing. Reflexes:  DTRs 2+ in bilateral biceps, brachioradialis, patella . ASSESSMENT AND PLAN      ICD-10-CM ICD-9-CM    1. Lumbar spondylosis  M47.816 721.3       2. Vitamin B 12 deficiency  E53.8 266.2 VITAMIN B12 & FOLATE      3. Numbness in both hands  R20.0 782.0 PROTEIN ELECTROPHORESIS      4. Paresthesia  R20.2 782.0 PROTEIN ELECTROPHORESIS        1. Lumbar spondylosis: Lumbar x-rays reviewed, continue to monitor, home excise program walking as tolerated, consider lumbar MRI if increased radicular symptoms. 2. Vitamin B 12 deficiency: Vitamin B12 levels to be assessed, continue with over-the-counter supplementation.  -     VITAMIN B12 & FOLATE; Future  3. Numbness in both hands: Patient has been previously seen by Dr. Kaley Strickland for carpal tunnel injection, encourage patient follow-up with orthopedics if any worsening, will order protein electrophoresis as well as vitamin B12 level as this has not been checked recently. -     PROTEIN ELECTROPHORESIS; Future  4. Paresthesia: Labs to be ordered, continue to monitor signs symptoms, skin biopsy recently completed was found to be within normal range.   Patient is continue gabapentin, she does note her primary care recently gave her a new prescription as she cannot tolerate the higher dose that was prescribed by Dr Paola Soria, patient to clarify what dosage she is really taking.  -     PROTEIN ELECTROPHORESIS; Future    Patient and/or family verbalized understand of all instructions and all questions/concerns were addressed. Safety/side effects of medications discussed. We will see the patient back in approximately 6 to 9 months, sooner if needed.   MARVA Salinas-BC

## 2023-01-04 ENCOUNTER — TELEPHONE (OUTPATIENT)
Dept: NEUROLOGY | Age: 73
End: 2023-01-04

## 2023-01-04 DIAGNOSIS — G56.01 MILD CARPAL TUNNEL SYNDROME OF RIGHT WRIST: ICD-10-CM

## 2023-01-04 DIAGNOSIS — R20.0 NUMBNESS IN BOTH HANDS: Primary | ICD-10-CM

## 2023-01-04 NOTE — TELEPHONE ENCOUNTER
Referral for PT mailed to patient as requested request she contact [facility] or check on line for areas available for her.

## 2023-01-04 NOTE — TELEPHONE ENCOUNTER
Patient called stating that she spoke with her insurance [facility]) and needs a referral from Daysi Pearson so she can get physical therapy done on her hand.  Please call   Home #: [phone]  Or   Cell #: [phone]

## 2023-01-10 ENCOUNTER — TELEPHONE (OUTPATIENT)
Dept: NEUROLOGY | Age: 73
End: 2023-01-10

## 2023-01-10 NOTE — TELEPHONE ENCOUNTER
Voicemail;    Patient called asking that Np Lexy please fax the referral to Aroldo Jung @[phone]

## 2023-01-12 NOTE — TELEPHONE ENCOUNTER
Patient request referral be faxed to Jamie Christianson coordinator for authorization  with cost. Done as requested.

## 2023-01-16 DIAGNOSIS — G62.9 NEUROPATHY: ICD-10-CM

## 2023-01-16 NOTE — TELEPHONE ENCOUNTER
Bronson Lindsay was put on an antibiotic on 12/15 for an ear infection. Mom states they were out of town this weekend, and left his prescription where they were. Mom is asking to get 3 more days worth of medication send to [facility] on [address]. Please call to discuss. PCP: Angélica Faria MD    Last appt: 11/16/2022  Future Appointments   Date Time Provider Ravindra Manuel   4/11/2023 10:00 AM Angélica Faria MD [facility] BS AMB   7/19/2023  2:30 PM Nicole Pugh, DEMI ARREOLA BS AMB       Requested Prescriptions     Pending Prescriptions Disp Refills    gabapentin (NEURONTIN) 100 mg capsule 270 Capsule 1     Sig: Take 1 Capsule by mouth three (3) times daily. Max Daily Amount: 300 mg.

## 2023-01-18 RX ORDER — GABAPENTIN 100 MG/1
100 CAPSULE ORAL 3 TIMES DAILY
Qty: 270 CAPSULE | Refills: 1 | Status: SHIPPED | OUTPATIENT
Start: 2023-01-18

## 2023-04-11 PROBLEM — N18.30 CHRONIC RENAL DISEASE, STAGE III (HCC): Status: RESOLVED | Noted: 2022-05-23 | Resolved: 2023-04-11

## 2023-04-20 ENCOUNTER — OFFICE VISIT (OUTPATIENT)
Dept: NEUROLOGY | Age: 73
End: 2023-04-20
Payer: MEDICARE

## 2023-04-20 VITALS
OXYGEN SATURATION: 97 % | HEART RATE: 86 BPM | RESPIRATION RATE: 16 BRPM | WEIGHT: 124.2 LBS | TEMPERATURE: 97.7 F | DIASTOLIC BLOOD PRESSURE: 80 MMHG | HEIGHT: 60 IN | SYSTOLIC BLOOD PRESSURE: 100 MMHG | BODY MASS INDEX: 24.39 KG/M2

## 2023-04-20 DIAGNOSIS — M75.51 BURSITIS OF SHOULDER, RIGHT: Primary | ICD-10-CM

## 2023-04-20 DIAGNOSIS — G63 IMMUNE-MEDIATED NEUROPATHY (HCC): ICD-10-CM

## 2023-04-20 DIAGNOSIS — E11.42 DIABETIC PERIPHERAL NEUROPATHY ASSOCIATED WITH TYPE 2 DIABETES MELLITUS (HCC): ICD-10-CM

## 2023-04-20 DIAGNOSIS — E53.8 B12 DEFICIENCY: ICD-10-CM

## 2023-04-20 DIAGNOSIS — G60.8 IDIOPATHIC SMALL FIBER SENSORY NEUROPATHY: ICD-10-CM

## 2023-04-20 DIAGNOSIS — M47.22 CERVICAL RADICULOPATHY DUE TO DEGENERATIVE JOINT DISEASE OF SPINE: ICD-10-CM

## 2023-04-20 DIAGNOSIS — D89.89 IMMUNE-MEDIATED NEUROPATHY (HCC): ICD-10-CM

## 2023-04-20 DIAGNOSIS — G56.03 BILATERAL CARPAL TUNNEL SYNDROME: ICD-10-CM

## 2023-04-20 DIAGNOSIS — M75.51 BURSITIS OF SHOULDER, RIGHT: ICD-10-CM

## 2023-04-20 PROCEDURE — G8420 CALC BMI NORM PARAMETERS: HCPCS | Performed by: PSYCHIATRY & NEUROLOGY

## 2023-04-20 PROCEDURE — 1101F PT FALLS ASSESS-DOCD LE1/YR: CPT | Performed by: PSYCHIATRY & NEUROLOGY

## 2023-04-20 PROCEDURE — 1123F ACP DISCUSS/DSCN MKR DOCD: CPT | Performed by: PSYCHIATRY & NEUROLOGY

## 2023-04-20 PROCEDURE — G9899 SCRN MAM PERF RSLTS DOC: HCPCS | Performed by: PSYCHIATRY & NEUROLOGY

## 2023-04-20 PROCEDURE — 99215 OFFICE O/P EST HI 40 MIN: CPT | Performed by: PSYCHIATRY & NEUROLOGY

## 2023-04-20 PROCEDURE — 2022F DILAT RTA XM EVC RTNOPTHY: CPT | Performed by: PSYCHIATRY & NEUROLOGY

## 2023-04-20 PROCEDURE — 3017F COLORECTAL CA SCREEN DOC REV: CPT | Performed by: PSYCHIATRY & NEUROLOGY

## 2023-04-20 PROCEDURE — G8399 PT W/DXA RESULTS DOCUMENT: HCPCS | Performed by: PSYCHIATRY & NEUROLOGY

## 2023-04-20 PROCEDURE — 3079F DIAST BP 80-89 MM HG: CPT | Performed by: PSYCHIATRY & NEUROLOGY

## 2023-04-20 PROCEDURE — 3046F HEMOGLOBIN A1C LEVEL >9.0%: CPT | Performed by: PSYCHIATRY & NEUROLOGY

## 2023-04-20 PROCEDURE — 3074F SYST BP LT 130 MM HG: CPT | Performed by: PSYCHIATRY & NEUROLOGY

## 2023-04-20 PROCEDURE — 1090F PRES/ABSN URINE INCON ASSESS: CPT | Performed by: PSYCHIATRY & NEUROLOGY

## 2023-04-20 PROCEDURE — G8536 NO DOC ELDER MAL SCRN: HCPCS | Performed by: PSYCHIATRY & NEUROLOGY

## 2023-04-20 PROCEDURE — G8432 DEP SCR NOT DOC, RNG: HCPCS | Performed by: PSYCHIATRY & NEUROLOGY

## 2023-04-20 PROCEDURE — G8427 DOCREV CUR MEDS BY ELIG CLIN: HCPCS | Performed by: PSYCHIATRY & NEUROLOGY

## 2023-04-20 NOTE — PROGRESS NOTES
Chief Complaint   Patient presents with    Follow-up      Numbness in extremities     1. Have you been to the ER, urgent care clinic since your last visit? No Hospitalized since your last visit? No    2. Have you seen or consulted any other health care providers outside of the [address] [place] [address] since your last visit? Yes hand therapy Include any pap smears or colon screening. No    Patient has noted no relief with hand numbness it is even gotten more pronounced.

## 2023-04-21 LAB
CK SERPL-CCNC: 66 U/L (ref 26–192)
EST. AVERAGE GLUCOSE BLD GHB EST-MCNC: 94 MG/DL
FOLATE SERPL-MCNC: 8 NG/ML (ref 5–21)
HBA1C MFR BLD: 4.9 % (ref 4–5.6)
VIT B12 SERPL-MCNC: 444 PG/ML (ref 193–986)

## 2023-04-21 NOTE — PROGRESS NOTES
Consult  REFERRED BY:  Marcelo Sifuentes MD    CHIEF COMPLAINT: Progressive numbness in her hands, and progressive numbness in her feet      Subjective:     Fernando Michaud is a 67 y.o. right-handed -American female seen as a new patient to me, for evaluation of progressive numbness in her hands and feet of unclear cause, and seen at the request of Dr. Brandy Dalton because her previous neurologist has left the practice. She had a skin biopsy done by Dr. Frances Mckeon that did not show she had a small fiber neuropathy, and was normal at all 3 sites. She had a previous EMG study that suggested a mild carpal tunnel syndrome on the right, and was treated aggressively with steroid injections wrist (for that, without clear improvement, and she thinks her numbness in her hands is getting worse, and she is never had a cervical MRI scan done to rule out anything cervical so we will reorder that, to make sure there is nothing else going on because her nerve fiber density on skin biopsy was normal at all 3 sites, and she has no evidence of small fiber neuropathy, and her EMG did not show any neuropathy when done 2 years ago by Dr Dmitriy Benítez. We sent off multiple metabolic panels, because she does not appear to have had all that done in the past.  We encouraged her to take a multivitamin and vitamin D every day, and stay mentally and physically active, and eat a Mediterranean type diet, and try to exercise half an hour every day is the best thing to do to keep her healthy and protect her nerves. She seems to have some pain and a little trouble sleeping at night, so we gave her 100 mg of gabapentin to try just to see if that will help her rest.  We reviewed her chart, in detail, reviewed her biopsies in detail, reviewed all her lab test in detail, reviewed her neurology notes in detail and her EMG studies, and her Ortho notes, and came up with these conclusions and plans for the patient at this time.     Past Medical History: Diagnosis Date    COVID     Gout 11/2022    HTN (hypertension) 05/13/2010    Neuropathy     Vitiligo 05/13/2010    ALEXUS Connell      Past Surgical History:   Procedure Laterality Date    ENDOSCOPY, COLON, DIAGNOSTIC  2003    hyperplastic polyps; 10 year repeat; Dr. Toya ANGEL  03/2013    cataract removal    HX Noem[facility] Galemadison    Dr. Jessica Medina FOOT/TOES      Bone spurs removed R & L foot      Family History   Problem Relation Age of Onset    Hypertension Sister     Stroke Sister 61    Diabetes Sister     Tuberculosis Sister     Stroke Mother 80        TIA    Cancer Father         Lung cancer      Social History     Tobacco Use    Smoking status: Never    Smokeless tobacco: Never   Substance Use Topics    Alcohol use: Yes     Alcohol/week: 1.0 standard drink     Types: 1 Cans of beer per week     Comment: rarely         Current Outpatient Medications:     chlorthalidone (HYGROTON) 25 mg tablet, Take 0.5 Tablets by mouth daily. , Disp: 45 Tablet, Rfl: 1    amitriptyline (ELAVIL) 10 mg tablet, Take 1 Tablet by mouth nightly., Disp: 90 Tablet, Rfl: 1    gabapentin (NEURONTIN) 100 mg capsule, Take 1 Capsule by mouth three (3) times daily. Max Daily Amount: 300 mg., Disp: 270 Capsule, Rfl: 1    amLODIPine-benazepril (LOTREL) 10-20 mg per capsule, TAKE 1 CAPSULE EVERY DAY, Disp: 90 Capsule, Rfl: 1    MULTIVITAMIN PO, Take  by mouth daily. , Disp: , Rfl:         Allergies   Allergen Reactions    Tramadol Nausea Only      MRI Results (most recent):  No results found for this or any previous visit. No results found for this or any previous visit.     Review of Systems:  A comprehensive review of systems was negative except for: Constitutional: positive for fatigue and malaise  Musculoskeletal: positive for myalgias, arthralgias, stiff joints, and back pain  Neurological: positive for paresthesia  Behvioral/Psych: positive for anxiety and depression   Vitals: 04/20/23 1511   BP: 100/80   Pulse: 86   Resp: 16   Temp: 97.7 °F (36.5 °C)   TempSrc: Temporal   SpO2: 97%   Weight: 124 lb 3.2 oz (56.3 kg)   Height: 5' (1.524 m)     Objective:     I      NEUROLOGICAL EXAM:    Appearance: The patient is well developed, well nourished, provides a coherent history and is in no acute distress. Mental Status: Oriented to time, place and person, and the president, cognitive function is normal and speech is fluent and no aphasia or dysarthria. Mood and affect appropriate. Cranial Nerves:   Intact visual fields. Fundi are benign, disc are flat, no lesions seen on funduscopy. ADALI, EOM's full, no nystagmus, no ptosis. Facial sensation is normal. Corneal reflexes are not tested. Facial movement is symmetric. Hearing is normal bilaterally. Palate is midline with normal sternocleidomastoid and trapezius muscles are normal. Tongue is midline. Neck without meningismus or bruits  Temporal arteries are not tender or enlarged  TMJ areas are not tender on palpation   Motor:  5/5 strength in upper and lower proximal and distal muscles. Normal bulk and tone. No fasciculations. Rapid alternating movement is symmetric and decreased bilaterally   Reflexes:   Deep tendon reflexes 1+/4 and symmetrical.  No babinski or clonus present  Patient has no Tinel's over the median nerves bilaterally. Sensory:   Abnormal to touch, pinprick and vibration and temperature in both feet to ankle level. DSS is intact   Gait:  Normal gait for patient's age. Tremor:   No tremor noted. Cerebellar:  No abnormal cerebellar signs present on Romberg and tandem testing and finger-nose-finger exam.   Neurovascular:  Normal heart sounds and regular rhythm, peripheral pulses decreased, and no carotid bruits.            Assessment:       ICD-10-CM ICD-9-CM    1. Bursitis of shoulder, right  M75.51 726.10 DANYA COMPREHENSIVE PLUS PANEL      LUPUS ANTICOAGULANT COMP PANEL      IMMUNOELECTROPHORESIS (IMMUNOFIX.)      GM1 IGG AUTOANTIBODY      GLUTAMIC ACID DECARB AB      GLIADIN ABS, IGA AND IGG      CK      ANTINEURONAL CELL AB      EMG LIMITED      MRI CERV SPINE WO CONT      2. Diabetic peripheral neuropathy associated with type 2 diabetes mellitus (HCC)  E11.42 250.60 DANYA COMPREHENSIVE PLUS PANEL     357.2 LUPUS ANTICOAGULANT COMP PANEL      IMMUNOELECTROPHORESIS (IMMUNOFIX.)      GM1 IGG AUTOANTIBODY      GLUTAMIC ACID DECARB AB      GLIADIN ABS, IGA AND IGG      CK      ANTINEURONAL CELL AB      HEMOGLOBIN A1C WITH EAG      EMG LIMITED      MRI CERV SPINE WO CONT      3. Idiopathic small fiber sensory neuropathy  G60.8 356.4 DANYA COMPREHENSIVE PLUS PANEL      LUPUS ANTICOAGULANT COMP PANEL      IMMUNOELECTROPHORESIS (IMMUNOFIX.)      GM1 IGG AUTOANTIBODY      GLUTAMIC ACID DECARB AB      GLIADIN ABS, IGA AND IGG      CK      ANTINEURONAL CELL AB      EMG LIMITED      MRI CERV SPINE WO CONT      4. Bilateral carpal tunnel syndrome  G56.03 354.0 DANYA COMPREHENSIVE PLUS PANEL      LUPUS ANTICOAGULANT COMP PANEL      IMMUNOELECTROPHORESIS (IMMUNOFIX.)      GM1 IGG AUTOANTIBODY      GLUTAMIC ACID DECARB AB      GLIADIN ABS, IGA AND IGG      CK      ANTINEURONAL CELL AB      EMG LIMITED      MRI CERV SPINE WO CONT      5. B12 deficiency  E53.8 266.2 DANYA COMPREHENSIVE PLUS PANEL      LUPUS ANTICOAGULANT COMP PANEL      IMMUNOELECTROPHORESIS (IMMUNOFIX.)      GM1 IGG AUTOANTIBODY      GLUTAMIC ACID DECARB AB      GLIADIN ABS, IGA AND IGG      CK      ANTINEURONAL CELL AB      VITAMIN B12 & FOLATE      EMG LIMITED      MRI CERV SPINE WO CONT      6. Immune-mediated neuropathy (HCC)  D89.89 279.8 DANYA COMPREHENSIVE PLUS PANEL    G63 357.4 LUPUS ANTICOAGULANT COMP PANEL      IMMUNOELECTROPHORESIS (IMMUNOFIX.)      GM1 IGG AUTOANTIBODY      GLUTAMIC ACID DECARB AB      GLIADIN ABS, IGA AND IGG      CK      ANTINEURONAL CELL AB      EMG LIMITED      MRI CERV SPINE WO CONT      7.  Cervical radiculopathy due to degenerative joint disease of spine  M47.22 721.0 DANYA COMPREHENSIVE PLUS PANEL      LUPUS ANTICOAGULANT COMP PANEL      IMMUNOELECTROPHORESIS (IMMUNOFIX.)      GM1 IGG AUTOANTIBODY      GLUTAMIC ACID DECARB AB      GLIADIN ABS, IGA AND IGG      CK      ANTINEURONAL CELL AB      EMG LIMITED      MRI CERV SPINE WO CONT        Active Problems:    * No active hospital problems. *      Plan:     Patient with problem of progressive numbness in her hands and feet, of unclear etiology, despite being treated aggressively for a mild carpal tunnel syndrome on the right side, which was found on an EMG done by orthopedics Dr. Isabel Holt, so we will repeat her EMG studies again to rule out carpal tunnel syndrome, rule out neuropathy, because her previous EMG study was normal with her previous neurologist 2 years ago. Because of the atypical nature of her symptoms, we will also order an MRI of the cervical spine just to make sure there is no demyelinating disease, spinal cord compression, or myelopathy as a cause of her symptoms. Complete metabolic panels all sent off again, to rule out anything treatable with her condition. We will follow her carefully, and further treatment evaluation will depend on the results of this clinical course.   Very difficult case, 45 minutes note this patient going over all of her symptoms, discussing her diagnosis prognosis and treatment options, we did advise her that she needs to stay physically mentally active, try to exercise a half an hour every day, and eat a Mediterranean type diet, and take a multivitamin and vitamin D every day1    Signed By: Irasema Granados MD     April 20, 2023       CC: Zena Borjas MD  FAX: [phone]

## 2023-04-22 LAB
CENTROMERE B AB SER-ACNC: <0.2 AI (ref 0–0.9)
CHROMATIN AB SERPL-ACNC: 0.2 AI (ref 0–0.9)
DSDNA AB SER-ACNC: <1 IU/ML (ref 0–9)
ENA JO1 AB SER-ACNC: <0.2 AI (ref 0–0.9)
ENA RNP AB SER-ACNC: <0.2 AI (ref 0–0.9)
ENA SCL70 AB SER-ACNC: <0.2 AI (ref 0–0.9)
ENA SM AB SER-ACNC: <0.2 AI (ref 0–0.9)
ENA SM+RNP AB SER-ACNC: <0.2 AI (ref 0–0.9)
ENA SS-A AB SER-ACNC: <0.2 AI (ref 0–0.9)
ENA SS-B AB SER-ACNC: <0.2 AI (ref 0–0.9)
IGA SERPL-MCNC: 283 MG/DL (ref 64–422)
IGG SERPL-MCNC: 1042 MG/DL (ref 586–1602)
IGM SERPL-MCNC: 35 MG/DL (ref 26–217)
RIBOSOMAL P AB SER-ACNC: <0.2 AI (ref 0–0.9)
SEE BELOW:, 164879: NORMAL

## 2023-04-24 LAB
APTT SCREEN TO CONFIRM RATIO: 1.28 RATIO (ref 0–1.34)
CONFIRM APTT/NORMAL: 39.9 SEC (ref 0–47.6)
GAD65 AB SER-ACNC: <5 U/ML (ref 0–5)
GLIADIN PEPTIDE IGA SER-ACNC: 10 UNITS (ref 0–19)
GLIADIN PEPTIDE IGG SER-ACNC: 3 UNITS (ref 0–19)
LA 2 SCREEN W REFLEX-IMP: NORMAL
SCREEN APTT: 29.8 SEC (ref 0–43.5)
SCREEN DRVVT: 32.8 SEC (ref 0–47)
THROMBIN TIME: 16.8 SEC (ref 0–23)

## 2023-04-25 LAB
GM1 IGG AUTOANTIBODIES, GM1GAT: <20 % (ref 0–30)
IGA SERPL-MCNC: 283 MG/DL (ref 64–422)
IGG SERPL-MCNC: 1042 MG/DL (ref 586–1602)
IGM SERPL-MCNC: 35 MG/DL (ref 26–217)
INTERPRETATION, RGM1GT: NORMAL
PROT PATTERN SERPL IFE-IMP: NORMAL

## 2023-05-03 ENCOUNTER — TELEPHONE (OUTPATIENT)
Dept: INTERNAL MEDICINE CLINIC | Age: 73
End: 2023-05-03

## 2023-05-03 ENCOUNTER — HOSPITAL ENCOUNTER (OUTPATIENT)
Dept: MRI IMAGING | Age: 73
Discharge: HOME | End: 2023-05-03
Attending: PSYCHIATRY & NEUROLOGY
Payer: MEDICARE

## 2023-05-03 DIAGNOSIS — G63 IMMUNE-MEDIATED NEUROPATHY (HCC): ICD-10-CM

## 2023-05-03 DIAGNOSIS — E53.8 B12 DEFICIENCY: ICD-10-CM

## 2023-05-03 DIAGNOSIS — G60.8 IDIOPATHIC SMALL FIBER SENSORY NEUROPATHY: ICD-10-CM

## 2023-05-03 DIAGNOSIS — M75.51 BURSITIS OF SHOULDER, RIGHT: ICD-10-CM

## 2023-05-03 DIAGNOSIS — D89.89 IMMUNE-MEDIATED NEUROPATHY (HCC): ICD-10-CM

## 2023-05-03 DIAGNOSIS — I10 ESSENTIAL HYPERTENSION: ICD-10-CM

## 2023-05-03 DIAGNOSIS — E11.42 DIABETIC PERIPHERAL NEUROPATHY ASSOCIATED WITH TYPE 2 DIABETES MELLITUS (HCC): ICD-10-CM

## 2023-05-03 DIAGNOSIS — G56.03 BILATERAL CARPAL TUNNEL SYNDROME: ICD-10-CM

## 2023-05-03 DIAGNOSIS — M47.22 CERVICAL RADICULOPATHY DUE TO DEGENERATIVE JOINT DISEASE OF SPINE: ICD-10-CM

## 2023-05-03 LAB
INTERPRETATION, NEU2LT: NORMAL
METHOD, NEU3LT: NORMAL
NEURONAL CELL AB, NEU1LT: 17 UNITS (ref 0–54)

## 2023-05-03 PROCEDURE — 72141 MRI NECK SPINE W/O DYE: CPT

## 2023-05-04 ENCOUNTER — DOCUMENTATION ONLY (OUTPATIENT)
Dept: NEUROLOGY | Age: 73
End: 2023-05-04

## 2023-06-19 RX ORDER — AMLODIPINE BESYLATE AND BENAZEPRIL HYDROCHLORIDE 10; 20 MG/1; MG/1
1 CAPSULE ORAL DAILY
Qty: 90 CAPSULE | Refills: 0 | Status: SHIPPED | OUTPATIENT
Start: 2023-06-19

## 2023-06-19 NOTE — TELEPHONE ENCOUNTER
PCP: Clemencia Angelucci, MD    Last appt: 4/11/2023  Future Appointments   Date Time Provider Nora Polo   6/21/2023  2:00 PM [facility] NEUM[facility] BS AMB   10/11/2023 11:15 AM Giovanny Her MD [facility] BS AMB   11/9/2023  2:30 PM JOHNY Castellanos NP NEUSPB BS AMB   1/25/2024  1:40 PM Olga Weaver MD [facility] BS AMB       Requested Prescriptions     Pending Prescriptions Disp Refills    amLODIPine-benazepril (LOTREL) 10-20 MG per capsule 30 capsule      Sig: Take 1 capsule by mouth daily

## 2023-06-19 NOTE — TELEPHONE ENCOUNTER
Patient states she needs refill done for 90 day supplies with refills thru [address] for her amLODIPine-benazepril (LOTREL) 10-20 mg per capsule. Please call if any questions.  Thank you      Patient reminded of [address] on refills

## 2023-06-21 ENCOUNTER — PROCEDURE VISIT (OUTPATIENT)
Age: 73
End: 2023-06-21
Payer: MEDICARE

## 2023-06-21 DIAGNOSIS — G60.8 IDIOPATHIC SMALL FIBER SENSORY NEUROPATHY: ICD-10-CM

## 2023-06-21 DIAGNOSIS — G56.03 BILATERAL CARPAL TUNNEL SYNDROME: ICD-10-CM

## 2023-06-21 DIAGNOSIS — G62.9 NEUROPATHY: Primary | ICD-10-CM

## 2023-06-21 DIAGNOSIS — G63 IMMUNE-MEDIATED NEUROPATHY (HCC): ICD-10-CM

## 2023-06-21 DIAGNOSIS — M47.22 OTHER SPONDYLOSIS WITH RADICULOPATHY, CERVICAL REGION: ICD-10-CM

## 2023-06-21 DIAGNOSIS — M47.27 LUMBOSACRAL RADICULOPATHY DUE TO DEGENERATIVE JOINT DISEASE OF SPINE: ICD-10-CM

## 2023-06-21 DIAGNOSIS — M47.22 CERVICAL RADICULOPATHY DUE TO DEGENERATIVE JOINT DISEASE OF SPINE: ICD-10-CM

## 2023-06-21 DIAGNOSIS — E11.42 DIABETIC PERIPHERAL NEUROPATHY ASSOCIATED WITH TYPE 2 DIABETES MELLITUS (HCC): ICD-10-CM

## 2023-06-21 DIAGNOSIS — D89.89 IMMUNE-MEDIATED NEUROPATHY (HCC): ICD-10-CM

## 2023-06-21 PROBLEM — N18.30 CHRONIC RENAL DISEASE, STAGE III (HCC): Status: ACTIVE | Noted: 2023-06-21

## 2023-06-21 PROCEDURE — 95912 NRV CNDJ TEST 11-12 STUDIES: CPT | Performed by: PSYCHIATRY & NEUROLOGY

## 2023-06-21 PROCEDURE — 95886 MUSC TEST DONE W/N TEST COMP: CPT | Performed by: PSYCHIATRY & NEUROLOGY

## 2023-06-22 NOTE — PROGRESS NOTES
Patient's EMG showed evidence of a very mild chronic C7 radiculopathy in the right upper extremity, but no other clear evidence of neuropathy, radiculopathy, entrapment neuropathy, carpal tunnel syndrome, or other neuromuscular disease. Because she is still symptomatic in her hands return to physical therapy but advised her she may need to see a neurosurgeon because of her moderate severe stenosis, but she wants to wait on that at this time.

## 2023-06-29 ENCOUNTER — HOSPITAL ENCOUNTER (OUTPATIENT)
Facility: HOSPITAL | Age: 73
Setting detail: RECURRING SERIES
End: 2023-06-29
Payer: MEDICARE

## 2023-06-29 PROCEDURE — 97162 PT EVAL MOD COMPLEX 30 MIN: CPT

## 2023-06-29 PROCEDURE — 97140 MANUAL THERAPY 1/> REGIONS: CPT

## 2023-06-29 PROCEDURE — 97110 THERAPEUTIC EXERCISES: CPT

## 2023-07-12 ENCOUNTER — HOSPITAL ENCOUNTER (OUTPATIENT)
Facility: HOSPITAL | Age: 73
Setting detail: RECURRING SERIES
Discharge: HOME | End: 2023-07-15
Payer: MEDICARE

## 2023-07-12 PROCEDURE — 97140 MANUAL THERAPY 1/> REGIONS: CPT

## 2023-07-12 PROCEDURE — 97110 THERAPEUTIC EXERCISES: CPT

## 2023-07-12 NOTE — PROGRESS NOTES
PLOF and address remaining functional goals. The manual therapy interventions were performed at a separate and distinct time from the therapeutic activities interventions . (see flow sheet as applicable)     Details if applicable:  STM to UT/LS/cervical paraspinals                   48 48    Total Total       [x]  Patient Education billed concurrently with other procedures   [x] Review HEP    [] Progressed/Changed HEP, detail:    [] Other detail:         Other Objective/Functional Measures  NA    Pain Level at end of session (0-10 scale): 7/10      Assessment   Patient tolerated treatment session well today, able to perform new exercises and progressions without increasing pain symptoms post treatment session. Patient did need frequent cuing throughout to avoid bilateral shoulder elevation but did note improvement following cuing. Patient was able to progress with strengthening during therex today. Continue to progress as tolerated. Patient will continue to benefit from skilled PT / OT services to modify and progress therapeutic interventions, analyze and address functional mobility deficits, analyze and address ROM deficits, analyze and address strength deficits, analyze and address soft tissue restrictions, analyze and cue for proper movement patterns, and analyze and modify for postural abnormalities to address functional deficits and attain remaining goals.     Progress toward goals / Updated goals:  []  See Progress Note/Recertification    Progressing towards goals      PLAN  Yes  Continue plan of care  Re-Cert Due: NA  [x]  Upgrade activities as tolerated  []  Discharge due to :  []  Other:      Naveed Tineo PTA       7/12/2023       10:13 AM

## 2023-07-20 ENCOUNTER — HOSPITAL ENCOUNTER (OUTPATIENT)
Facility: HOSPITAL | Age: 73
Setting detail: RECURRING SERIES
Discharge: HOME | End: 2023-07-23
Payer: MEDICARE

## 2023-07-20 ENCOUNTER — APPOINTMENT (OUTPATIENT)
Facility: HOSPITAL | Age: 73
End: 2023-07-20
Payer: MEDICARE

## 2023-07-20 PROCEDURE — 97140 MANUAL THERAPY 1/> REGIONS: CPT

## 2023-07-20 PROCEDURE — 97110 THERAPEUTIC EXERCISES: CPT

## 2023-07-20 NOTE — PROGRESS NOTES
PHYSICAL THERAPY - MEDICARE DAILY TREATMENT NOTE (updated 3/23)      Date: 2023          Patient Name:  Adriane Alston :  1950   Medical   Diagnosis:  No admission diagnoses are documented for this encounter. Treatment Diagnosis:  M54.2  NECK PAIN    Referral Source:  Nain Ko MD Insurance:   Payor: [employer] / Plan: [email or web address] HMO / Product Type: *No Product type* /                     Patient  verified yes     Visit #   Current  / Total 3 24   Time   In / Out 205 300   Total Treatment Time 55   Total Timed Codes 55   1:1 Treatment Time 40      [facility] Totals Reminder:  bill using total billable   min of TIMED therapeutic procedures and modalities. 8-22 min = 1 unit; 23-37 min = 2 units; 38-52 min = 3 units; 53-67 min = 4 units; 68-82 min = 5 units            SUBJECTIVE    Pain Level (0-10 scale): 7/10    Any medication changes, allergies to medications, adverse drug reactions, diagnosis change, or new procedure performed?: [x] No    [] Yes (see summary sheet for update)  Medications: Verified on Patient Summary List    Subjective functional status/changes:     Patient noted they continue to note increased amounts of numbness/tingling/coldness of their fingers since previous treatment session, noting \"it feels about the same,\"     OBJECTIVE      Therapeutic Procedures: Tx Min Billable or 1:1 Min (if diff from Tx Min) Procedure, Rationale, Specifics   41 66 [id number] Therapeutic Exercise (timed):  increase ROM, strength, coordination, balance, and proprioception to improve patient's ability to progress to PLOF and address remaining functional goals. (see flow sheet as applicable)     Details if applicable:     10 10 28190 Manual Therapy (timed):  decrease pain, increase ROM, increase tissue extensibility, decrease trigger points, and increase postural awareness to improve patient's ability to progress to PLOF and address remaining functional goals.   The manual therapy

## 2023-07-21 ENCOUNTER — APPOINTMENT (OUTPATIENT)
Facility: HOSPITAL | Age: 73
End: 2023-07-21
Payer: MEDICARE

## 2023-07-27 ENCOUNTER — HOSPITAL ENCOUNTER (OUTPATIENT)
Facility: HOSPITAL | Age: 73
Setting detail: RECURRING SERIES
Discharge: HOME | End: 2023-07-30
Payer: MEDICARE

## 2023-07-27 PROCEDURE — 97140 MANUAL THERAPY 1/> REGIONS: CPT

## 2023-07-27 PROCEDURE — 97110 THERAPEUTIC EXERCISES: CPT

## 2023-07-27 NOTE — THERAPY DISCHARGE
[address] [facility] Physical Therapy  General acute hospital [address] ([place]), [address] [facility]  Phone: [phone]   Fax: [phone]     DISCHARGE SUMMARY  Patient Name: Helen Sanchez : 1950   Treatment/Medical Diagnosis: No admission diagnoses are documented for this encounter. Referral Source: Mandy Vargas MD     Date of Initial Visit: 23 Attended Visits: 4 Missed Visits: 0     SUMMARY OF TREATMENT  Pt has completed 4 sessions of PT over the last month. She has not made progress to date and states that her symptoms remain. She has not responded positively or negatively to manual techniques or therapeutic exercises. Cervical ROM, compression, distraction, nor peripheral nerve work has affected her symptoms in any way. She states that she continues to do what she needs to do on a day to day basis and denies any functional limitations. She will be Dc'd today due to lack of progress toward her goals      CURRENT STATUS  Short Term Goals: To be accomplished in 8-10 treatments  Pt will be consistent and demonstrate I with HEP MET  Pt will complain of pain 3-4/10 with all activity Not MET  Pt will demonstrate improved increased cervical strength to stabilize the neck and spine to maintian better posture with all activity Not MET  Pt will be able to maintain positions for 30min without increased symptoms Not MET     Long Term Goals:  To be accomplished in 20-24 treatments  Pt will complain of pain 0-1/10 with all activity Not MET  Pt will increase strength to complete all household chores and yardwork without increased symptoms Progressing Toward  Pt will increase FOTO score by 3 points to meet [facility] and demonstrate improved function with all activity Not MEt  Pt will decrease radicular symptoms by 100% to complete all activity Not MET  Pt will use proper form and posture to complete all work tasks without increased symptoms 100% of the time Not

## 2023-07-27 NOTE — PROGRESS NOTES
PHYSICAL THERAPY - MEDICARE DAILY TREATMENT NOTE (updated 3/23)      Date: 2023          Patient Name:  Celia Alves :  1950   Medical   Diagnosis:  No admission diagnoses are documented for this encounter. Treatment Diagnosis:  M54.2  NECK PAIN    Referral Source:  Seda Dacosta MD Insurance:   Payor: [facility] / Plan: [employer] HMO / Product Type: *No Product type* /                     Patient  verified yes     Visit #   Current  / Total 4 24   Time   In / Out 835 922   Total Treatment Time 47   Total Timed Codes 47   1:1 Treatment Time 32      [facility] Totals Reminder:  bill using total billable   min of TIMED therapeutic procedures and modalities. 8-22 min = 1 unit; 23-37 min = 2 units; 38-52 min = 3 units; 53-67 min = 4 units; 68-82 min = 5 units            SUBJECTIVE    Pain Level (0-10 scale): 7/10    Any medication changes, allergies to medications, adverse drug reactions, diagnosis change, or new procedure performed?: [x] No    [] Yes (see summary sheet for update)  Medications: Verified on Patient Summary List    Subjective functional status/changes:     Pt states that she is about the same. Continues to have symptoms in her hand and fingers. Denies symptoms anywhere else    OBJECTIVE      Therapeutic Procedures: Tx Min Billable or 1:1 Min (if diff from Tx Min) Procedure, Rationale, Specifics   39 24 72006 Therapeutic Exercise (timed):  increase ROM, strength, coordination, balance, and proprioception to improve patient's ability to progress to PLOF and address remaining functional goals. (see flow sheet as applicable)     Details if applicable:      75453 Manual Therapy (timed):  decrease pain, increase ROM, increase tissue extensibility, decrease trigger points, and increase postural awareness to improve patient's ability to progress to PLOF and address remaining functional goals.   The manual therapy interventions were performed at a separate and distinct

## 2023-08-07 DIAGNOSIS — G60.8 IDIOPATHIC SMALL FIBER SENSORY NEUROPATHY: Primary | ICD-10-CM

## 2023-08-07 RX ORDER — GABAPENTIN 100 MG/1
100 CAPSULE ORAL 3 TIMES DAILY
Qty: 270 CAPSULE | Refills: 1 | OUTPATIENT
Start: 2023-08-07 | End: 2024-02-03

## 2023-08-07 NOTE — TELEPHONE ENCOUNTER
PCP: Juan Agustin MD    Last appt: 4/11/2023  Future Appointments   Date Time Provider [address]   10/11/2023 11:15 AM Rebeca Rich MD [facility] BS AMB   11/9/2023  2:30 PM JOHNY Zelaya - NP [facility] BS AMB   1/25/2024  1:40 PM MD [facility] BS AMB       Requested Prescriptions     Pending Prescriptions Disp Refills    gabapentin (NEURONTIN) 100 MG capsule 270 capsule 1     Sig: Take 1 capsule by mouth 3 times daily for 180 days.  Max Daily Amount: 300 mg

## 2023-08-07 NOTE — TELEPHONE ENCOUNTER
Patient states she needs refill done thru [facility] for 90 day supplies w/refills for her gabapentin (NEURONTIN) 100 mg capsule. Please call if any questions.  Thank you      Patient reminded of [address] on refills

## 2023-08-14 DIAGNOSIS — G60.8 IDIOPATHIC SMALL FIBER SENSORY NEUROPATHY: ICD-10-CM

## 2023-08-14 NOTE — TELEPHONE ENCOUNTER
Patient medication refill for 8/7/23 did not go Thru to [facility] patient needs this to be done as [employer] as she would have to pay even for Small Amount thru [employer].     Patient states she needs to get [employer] done thru [address] [facility]. Please call if any questions or to advise when done.  Thank you

## 2023-08-15 RX ORDER — GABAPENTIN 100 MG/1
100 CAPSULE ORAL 3 TIMES DAILY
Qty: 270 CAPSULE | Refills: 1 | Status: SHIPPED | OUTPATIENT
Start: 2023-08-15 | End: 2023-08-18 | Stop reason: SDUPTHER

## 2023-08-15 NOTE — TELEPHONE ENCOUNTER
PCP: Eli Alba MD    Last appt: 4/11/2023  Future Appointments   Date Time Provider [address]   10/11/2023 11:15 AM Kenton Cornejo MD [facility] BS AMB   11/9/2023  2:30 PM Jeff King, APRN - NP NEUMRSPB BS AMB   1/25/2024  1:40 PM Vale Huggins MD [facility] BS AMB       Requested Prescriptions     Pending Prescriptions Disp Refills    gabapentin (NEURONTIN) 100 MG capsule 270 capsule 1     Sig: Take 1 capsule by mouth 3 times daily for 180 days.  Max Daily Amount: 300 mg

## 2023-08-15 NOTE — TELEPHONE ENCOUNTER
Enzo states she needs a call back today in reference to prescription she has been trying to get since last Monday that was Approved on 8/7/23 but did not get received or go thru to [employer]. Please call to discuss.  Thank you

## 2023-08-16 NOTE — TELEPHONE ENCOUNTER
Called, spoke to pt. Two pt identifiers confirmed. Patient states she will run out of her medication by Friday. Patient states she called last week on 08/07/2023 for a refill on her gabapentin medication. Gabapentin was approved on 08/15/2023 going to the mail order pharmacy. Patient is request a temporary supply of medication be sent to the [address] [place] [address] on [address]. Patient informed her request will be sent to Cristian Sanchez MD    Patient verbalized understanding of information discussed w/ no further questions at this time.      David English LPN

## 2023-08-17 DIAGNOSIS — G60.8 IDIOPATHIC SMALL FIBER SENSORY NEUROPATHY: ICD-10-CM

## 2023-08-17 NOTE — TELEPHONE ENCOUNTER
Patient came into the office asking for a refill for her gabapentin. Patient states out of medication. Please advise.

## 2023-08-18 RX ORDER — GABAPENTIN 100 MG/1
100 CAPSULE ORAL 3 TIMES DAILY
Qty: 270 CAPSULE | Refills: 1 | OUTPATIENT
Start: 2023-08-18 | End: 2024-02-14

## 2023-08-18 NOTE — TELEPHONE ENCOUNTER
Future Appointments:  Future Appointments   Date Time Provider [address]   10/11/2023 11:15 AM Tomer Arnold MD [facility] BS AMB   11/9/2023  2:30 PM JOHNY East NP BS AMB   1/25/2024  1:40 PM MD SAWYER Reeves BS AMB        Last Appointment With Me:  4/11/2023     Requested Prescriptions     Pending Prescriptions Disp Refills    gabapentin (NEURONTIN) 100 MG capsule 270 capsule 1     Sig: Take 1 capsule by mouth 3 times daily for 180 days.  Max Daily Amount: 300 mg

## 2023-08-21 DIAGNOSIS — G60.8 IDIOPATHIC SMALL FIBER SENSORY NEUROPATHY: ICD-10-CM

## 2023-08-21 RX ORDER — GABAPENTIN 100 MG/1
100 CAPSULE ORAL 3 TIMES DAILY
Qty: 270 CAPSULE | Refills: 1 | Status: SHIPPED | OUTPATIENT
Start: 2023-08-21 | End: 2024-02-17

## 2023-08-21 NOTE — TELEPHONE ENCOUNTER
Future Appointments:  Future Appointments   Date Time Provider [address]   10/11/2023 11:15 AM Elena Walker MD [facility] BS AMB   11/9/2023  2:30 PM Meldon Homans, APRN - NP [facility] BS AMB   1/25/2024  1:40 PM MD CHARLOTTE VillafuerteSPDORCAS BS AMB        Last Appointment With Me:  4/11/2023     Requested Prescriptions     Pending Prescriptions Disp Refills    gabapentin (NEURONTIN) 100 MG capsule 270 capsule 1     Sig: Take 1 capsule by mouth 3 times daily for 180 days.  Max Daily Amount: 300 mg

## 2023-08-21 NOTE — TELEPHONE ENCOUNTER
Future Appointments:  Future Appointments   Date Time Provider [address]   10/11/2023 11:15 AM Nino Greenfield MD [facility] BS AMB   11/9/2023  2:30 PM JOHNY Rnagel NP BS AMB   1/25/2024  1:40 PM MD CHARLOTTE BernardSPDORCAS BS AMB        Last Appointment With Me:  4/11/2023     Requested Prescriptions     Pending Prescriptions Disp Refills    gabapentin (NEURONTIN) 100 MG capsule 270 capsule 1     Sig: Take 1 capsule by mouth 3 times daily for 180 days.  Max Daily Amount: 300 mg

## 2023-08-21 NOTE — TELEPHONE ENCOUNTER
Regarding medication:  Gabapentin    Problem:  Pt still has not gotten any of the gabapentin and has been out. See previous encounters    Pt didn't have a local pharmacy on file, psr added preferred local address per pt: [address] [facility], [address] [phone] - f [phone]    Please check for local and mail order transmissions              Caller confirms readback of documented phone/fax number(s) as correct.

## 2023-08-21 NOTE — TELEPHONE ENCOUNTER
Patient states she had been without medication & this has been ongoing since last week & needs refill done Asap to [facility]. Please see Previous Message in chart.  Thank you

## 2023-09-11 DIAGNOSIS — F32.89 OTHER SPECIFIED DEPRESSIVE EPISODES: ICD-10-CM

## 2023-09-11 DIAGNOSIS — I10 ESSENTIAL (PRIMARY) HYPERTENSION: Primary | ICD-10-CM

## 2023-09-11 RX ORDER — AMLODIPINE BESYLATE AND BENAZEPRIL HYDROCHLORIDE 10; 20 MG/1; MG/1
1 CAPSULE ORAL DAILY
Qty: 90 CAPSULE | Refills: 0 | Status: CANCELLED | OUTPATIENT
Start: 2023-09-11

## 2023-09-11 ASSESSMENT — ENCOUNTER SYMPTOMS: SHORTNESS OF BREATH: 0

## 2023-09-11 NOTE — TELEPHONE ENCOUNTER
Patient states she needs refill done thru [facility] for 90 day supplies for her amitriptyline (ELAVIL) 10 mg tablet & AmLODIPine-benazepril (LOTREL) 10-20 MG per capsule. Please call if any questions.  Thank you      Patient reminded of [address] on refills

## 2023-09-11 NOTE — TELEPHONE ENCOUNTER
PCP: Cade Dyer MD    Last appt: 4/11/2023  Future Appointments   Date Time Provider [address]   9/15/2023  8:30 AM Gaby Daley MD [facility] BS AMB   10/11/2023 11:15 AM Gaby Daley MD [facility] BS AMB   11/9/2023  2:30 PM JOHNY Garcia - NP [facility] BS AMB   1/25/2024  1:40 PM Sameera Dumont MD [facility] BS AMB       Requested Prescriptions     Pending Prescriptions Disp Refills    amLODIPine-benazepril (LOTREL) 10-20 MG per capsule 90 capsule 0     Sig: Take 1 capsule by mouth daily    amitriptyline (ELAVIL) 10 MG tablet 90 tablet 1     Sig: Take 1 tablet by mouth nightly

## 2023-09-15 ENCOUNTER — TELEMEDICINE (OUTPATIENT)
Age: 73
End: 2023-09-15
Payer: MEDICARE

## 2023-09-15 DIAGNOSIS — N18.31 STAGE 3A CHRONIC KIDNEY DISEASE (HCC): Primary | ICD-10-CM

## 2023-09-15 DIAGNOSIS — M48.02 CERVICAL STENOSIS OF SPINE: ICD-10-CM

## 2023-09-15 DIAGNOSIS — G62.9 NEUROPATHY: ICD-10-CM

## 2023-09-15 DIAGNOSIS — E78.5 DYSLIPIDEMIA: ICD-10-CM

## 2023-09-15 DIAGNOSIS — I10 ESSENTIAL HYPERTENSION: ICD-10-CM

## 2023-09-15 DIAGNOSIS — M85.89 OSTEOPENIA OF MULTIPLE SITES: ICD-10-CM

## 2023-09-15 PROCEDURE — 99214 OFFICE O/P EST MOD 30 MIN: CPT | Performed by: INTERNAL MEDICINE

## 2023-09-15 PROCEDURE — G8399 PT W/DXA RESULTS DOCUMENT: HCPCS | Performed by: INTERNAL MEDICINE

## 2023-09-15 PROCEDURE — 1090F PRES/ABSN URINE INCON ASSESS: CPT | Performed by: INTERNAL MEDICINE

## 2023-09-15 PROCEDURE — 1123F ACP DISCUSS/DSCN MKR DOCD: CPT | Performed by: INTERNAL MEDICINE

## 2023-09-15 PROCEDURE — 3017F COLORECTAL CA SCREEN DOC REV: CPT | Performed by: INTERNAL MEDICINE

## 2023-09-15 PROCEDURE — G8427 DOCREV CUR MEDS BY ELIG CLIN: HCPCS | Performed by: INTERNAL MEDICINE

## 2023-09-15 RX ORDER — AMITRIPTYLINE HYDROCHLORIDE 10 MG/1
10 TABLET, FILM COATED ORAL NIGHTLY
Qty: 90 TABLET | Refills: 1 | Status: SHIPPED | OUTPATIENT
Start: 2023-09-15

## 2023-09-15 SDOH — ECONOMIC STABILITY: INCOME INSECURITY: HOW HARD IS IT FOR YOU TO PAY FOR THE VERY BASICS LIKE FOOD, HOUSING, MEDICAL CARE, AND HEATING?: NOT HARD AT ALL

## 2023-09-15 SDOH — ECONOMIC STABILITY: FOOD INSECURITY: WITHIN THE PAST 12 MONTHS, THE FOOD YOU BOUGHT JUST DIDN'T LAST AND YOU DIDN'T HAVE MONEY TO GET MORE.: NEVER TRUE

## 2023-09-15 SDOH — ECONOMIC STABILITY: HOUSING INSECURITY
IN THE LAST 12 MONTHS, WAS THERE A TIME WHEN YOU DID NOT HAVE A STEADY PLACE TO SLEEP OR SLEPT IN A SHELTER (INCLUDING NOW)?: NO

## 2023-09-15 SDOH — ECONOMIC STABILITY: FOOD INSECURITY: WITHIN THE PAST 12 MONTHS, YOU WORRIED THAT YOUR FOOD WOULD RUN OUT BEFORE YOU GOT MONEY TO BUY MORE.: NEVER TRUE

## 2023-09-15 ASSESSMENT — PATIENT HEALTH QUESTIONNAIRE - PHQ9
8. MOVING OR SPEAKING SO SLOWLY THAT OTHER PEOPLE COULD HAVE NOTICED. OR THE OPPOSITE, BEING SO FIGETY OR RESTLESS THAT YOU HAVE BEEN MOVING AROUND A LOT MORE THAN USUAL: 0
10. IF YOU CHECKED OFF ANY PROBLEMS, HOW DIFFICULT HAVE THESE PROBLEMS MADE IT FOR YOU TO DO YOUR WORK, TAKE CARE OF THINGS AT HOME, OR GET ALONG WITH OTHER PEOPLE: 0
SUM OF ALL RESPONSES TO PHQ QUESTIONS 1-9: 1
SUM OF ALL RESPONSES TO PHQ QUESTIONS 1-9: 1
1. LITTLE INTEREST OR PLEASURE IN DOING THINGS: 1
6. FEELING BAD ABOUT YOURSELF - OR THAT YOU ARE A FAILURE OR HAVE LET YOURSELF OR YOUR FAMILY DOWN: 0
4. FEELING TIRED OR HAVING LITTLE ENERGY: 0
SUM OF ALL RESPONSES TO PHQ9 QUESTIONS 1 & 2: 1
SUM OF ALL RESPONSES TO PHQ QUESTIONS 1-9: 1
SUM OF ALL RESPONSES TO PHQ QUESTIONS 1-9: 1
3. TROUBLE FALLING OR STAYING ASLEEP: 0
5. POOR APPETITE OR OVEREATING: 0
7. TROUBLE CONCENTRATING ON THINGS, SUCH AS READING THE NEWSPAPER OR WATCHING TELEVISION: 0
2. FEELING DOWN, DEPRESSED OR HOPELESS: 0
9. THOUGHTS THAT YOU WOULD BE BETTER OFF DEAD, OR OF HURTING YOURSELF: 0

## 2023-09-15 NOTE — PROGRESS NOTES
Patient's EMG showed evidence of a very mild chronic C7 radiculopathy in the right upper extremity, but no other clear evidence of neuropathy, radiculopathy, entrapment neuropathy, carpal tunnel syndrome, or other neuromuscular disease. Because she is still symptomatic in her hands return to physical therapy but advised her she may need to see a neurosurgeon because of her moderate severe stenosis, but she wants to wait on that at this time.
This Virtual Visit was conducted with patient's (and/or legal guardian's) consent. Patient identification was verified, and a caregiver was present when appropriate. The patient was located at Home: 1 Fingerprint  Provider was located at Home ([address] [place] [address]): [address] [place] [address] on 9/11/2023 at 3:16 PM  An electronic signature was used to authenticate this note.

## 2023-09-19 ENCOUNTER — TELEPHONE (OUTPATIENT)
Age: 73
End: 2023-09-19

## 2023-09-19 RX ORDER — AMITRIPTYLINE HYDROCHLORIDE 10 MG/1
10 TABLET, FILM COATED ORAL NIGHTLY
Qty: 90 TABLET | Refills: 1 | Status: SHIPPED | OUTPATIENT
Start: 2023-09-19

## 2023-09-19 RX ORDER — AMLODIPINE BESYLATE AND BENAZEPRIL HYDROCHLORIDE 10; 20 MG/1; MG/1
1 CAPSULE ORAL DAILY
Qty: 90 CAPSULE | Refills: 0 | Status: SHIPPED | OUTPATIENT
Start: 2023-09-19

## 2023-09-19 NOTE — TELEPHONE ENCOUNTER
PCP: Emmanuel Munoz MD    Last appt: 9/15/2023  Future Appointments   Date Time Provider [address]   11/9/2023  2:30 PM JOHNY Ocampo NP BS AMB   1/25/2024  1:40 PM MD SAWYER Montez BS AMB   3/13/2024  1:30 PM Umberto Wilkins MD [facility] BS AMB       Requested Prescriptions     Pending Prescriptions Disp Refills    amLODIPine-benazepril (LOTREL) 10-20 MG per capsule 90 capsule 0     Sig: Take 1 capsule by mouth daily

## 2023-09-20 ENCOUNTER — NURSE ONLY (OUTPATIENT)
Age: 73
End: 2023-09-20

## 2023-09-20 DIAGNOSIS — E78.5 DYSLIPIDEMIA: ICD-10-CM

## 2023-09-20 DIAGNOSIS — I10 ESSENTIAL HYPERTENSION: ICD-10-CM

## 2023-09-21 LAB
ANION GAP SERPL CALC-SCNC: 4 MMOL/L (ref 5–15)
BUN SERPL-MCNC: 19 MG/DL (ref 6–20)
BUN/CREAT SERPL: 18 (ref 12–20)
CALCIUM SERPL-MCNC: 9.3 MG/DL (ref 8.5–10.1)
CHLORIDE SERPL-SCNC: 106 MMOL/L (ref 97–108)
CHOLEST SERPL-MCNC: 184 MG/DL
CO2 SERPL-SCNC: 31 MMOL/L (ref 21–32)
CREAT SERPL-MCNC: 1.03 MG/DL (ref 0.55–1.02)
GLUCOSE SERPL-MCNC: 76 MG/DL (ref 65–100)
HDLC SERPL-MCNC: 62 MG/DL
HDLC SERPL: 3 (ref 0–5)
LDLC SERPL CALC-MCNC: 112.2 MG/DL (ref 0–100)
POTASSIUM SERPL-SCNC: 3.5 MMOL/L (ref 3.5–5.1)
SODIUM SERPL-SCNC: 141 MMOL/L (ref 136–145)
TRIGL SERPL-MCNC: 49 MG/DL
VLDLC SERPL CALC-MCNC: 9.8 MG/DL

## 2023-09-28 ENCOUNTER — TELEPHONE (OUTPATIENT)
Age: 73
End: 2023-09-28

## 2023-09-28 NOTE — TELEPHONE ENCOUNTER
Patient states she needs a call back to discuss Worsening Hand/Numbness since VV appt on 9/15/23. Patient states Gabapentin helps but is not fixing issues & can barely feel anything she touches or picks up. Please call to discuss & advise Plan of Care. Patient states a detailed message can be left on voice mail if no answer.  Thank you

## 2023-09-29 ENCOUNTER — TELEPHONE (OUTPATIENT)
Age: 73
End: 2023-09-29

## 2023-09-29 NOTE — TELEPHONE ENCOUNTER
----- Message from Ember Greenyanira sent at 9/28/2023  4:17 PM EDT -----  Subject: Message to Provider    QUESTIONS  Information for Provider? Patient states that Dr Glenda Franklin office needs   files sent to office before they will book an appointment with the   patient. Please send to [facility] [address] [facility] [place]   [address] [facility]  [facility] . Please call   patient once these files have been sent so she can call and make the   appointment. [phone]  ---------------------------------------------------------------------------  --------------  Deidre Nava [facility]  [phone]; OK to leave message on voicemail  ---------------------------------------------------------------------------  --------------  SCRIPT ANSWERS  Relationship to Patient?  Self

## 2023-10-25 ENCOUNTER — TELEPHONE (OUTPATIENT)
Age: 73
End: 2023-10-25

## 2023-10-25 NOTE — TELEPHONE ENCOUNTER
Pt would like you to know that she thinks she has been scammed as she got a call from [facility] and they knew all her info, but they asked for her information from medicare card. She gave it to them. They knew everything about her so she thought this was ok. She wants to know if this is legit? She only wants you to know because they knew doctor's name as well. Pt states this is an fyi only. She did report this to the most important people as medicare told her to do as she called them.

## 2023-10-30 RX ORDER — CHLORTHALIDONE 25 MG/1
TABLET ORAL
Qty: 45 TABLET | Refills: 0 | Status: SHIPPED | OUTPATIENT
Start: 2023-10-30

## 2023-11-02 RX ORDER — AMLODIPINE BESYLATE AND BENAZEPRIL HYDROCHLORIDE 10; 20 MG/1; MG/1
1 CAPSULE ORAL DAILY
Qty: 90 CAPSULE | Refills: 1 | Status: SHIPPED | OUTPATIENT
Start: 2023-11-02

## 2023-11-08 ENCOUNTER — OFFICE VISIT (OUTPATIENT)
Age: 73
End: 2023-11-08
Payer: MEDICARE

## 2023-11-08 VITALS
TEMPERATURE: 98.1 F | DIASTOLIC BLOOD PRESSURE: 82 MMHG | SYSTOLIC BLOOD PRESSURE: 128 MMHG | BODY MASS INDEX: 25.56 KG/M2 | RESPIRATION RATE: 18 BRPM | HEART RATE: 78 BPM | HEIGHT: 60 IN | WEIGHT: 130.2 LBS | OXYGEN SATURATION: 100 %

## 2023-11-08 DIAGNOSIS — M54.12 CERVICAL RADICULOPATHY: Primary | ICD-10-CM

## 2023-11-08 DIAGNOSIS — R20.0 BILATERAL HAND NUMBNESS: ICD-10-CM

## 2023-11-08 PROCEDURE — G8399 PT W/DXA RESULTS DOCUMENT: HCPCS | Performed by: NURSE PRACTITIONER

## 2023-11-08 PROCEDURE — 1123F ACP DISCUSS/DSCN MKR DOCD: CPT | Performed by: NURSE PRACTITIONER

## 2023-11-08 PROCEDURE — 99213 OFFICE O/P EST LOW 20 MIN: CPT | Performed by: NURSE PRACTITIONER

## 2023-11-08 PROCEDURE — 3017F COLORECTAL CA SCREEN DOC REV: CPT | Performed by: NURSE PRACTITIONER

## 2023-11-08 PROCEDURE — 3074F SYST BP LT 130 MM HG: CPT | Performed by: NURSE PRACTITIONER

## 2023-11-08 PROCEDURE — 1090F PRES/ABSN URINE INCON ASSESS: CPT | Performed by: NURSE PRACTITIONER

## 2023-11-08 PROCEDURE — G8419 CALC BMI OUT NRM PARAM NOF/U: HCPCS | Performed by: NURSE PRACTITIONER

## 2023-11-08 PROCEDURE — 1036F TOBACCO NON-USER: CPT | Performed by: NURSE PRACTITIONER

## 2023-11-08 PROCEDURE — G8427 DOCREV CUR MEDS BY ELIG CLIN: HCPCS | Performed by: NURSE PRACTITIONER

## 2023-11-08 PROCEDURE — 3079F DIAST BP 80-89 MM HG: CPT | Performed by: NURSE PRACTITIONER

## 2023-11-08 PROCEDURE — G8484 FLU IMMUNIZE NO ADMIN: HCPCS | Performed by: NURSE PRACTITIONER

## 2023-11-08 ASSESSMENT — PATIENT HEALTH QUESTIONNAIRE - PHQ9
SUM OF ALL RESPONSES TO PHQ QUESTIONS 1-9: 0
2. FEELING DOWN, DEPRESSED OR HOPELESS: 1
SUM OF ALL RESPONSES TO PHQ9 QUESTIONS 1 & 2: 2
1. LITTLE INTEREST OR PLEASURE IN DOING THINGS: 0
SUM OF ALL RESPONSES TO PHQ QUESTIONS 1-9: 2
2. FEELING DOWN, DEPRESSED OR HOPELESS: 0
SUM OF ALL RESPONSES TO PHQ QUESTIONS 1-9: 0
SUM OF ALL RESPONSES TO PHQ QUESTIONS 1-9: 0
SUM OF ALL RESPONSES TO PHQ QUESTIONS 1-9: 2
SUM OF ALL RESPONSES TO PHQ QUESTIONS 1-9: 2
1. LITTLE INTEREST OR PLEASURE IN DOING THINGS: 1
SUM OF ALL RESPONSES TO PHQ QUESTIONS 1-9: 2
SUM OF ALL RESPONSES TO PHQ QUESTIONS 1-9: 0
SUM OF ALL RESPONSES TO PHQ9 QUESTIONS 1 & 2: 0

## 2023-11-08 ASSESSMENT — ENCOUNTER SYMPTOMS: GASTROINTESTINAL NEGATIVE: 1

## 2023-11-08 NOTE — PROGRESS NOTES
Chief Complaint   Patient presents with    Neurologic Problem     Follow up for neuropathy - hands are numb and painful - started in one finger and now in all fingers-arms -legs and feet      1. Have you been to the ER, urgent care clinic since your last visit? Hospitalized since your last visit? No     2. Have you seen or consulted any other health care providers outside of the [address] [place] [address] since your last visit? Include any pap smears or colon screening.   Yes [facility] for a colonoscopy

## 2023-11-08 NOTE — PROGRESS NOTES
[facility]  [address] [place] [address]  Tel: [phone]  Fax: [phone]      Date:  23     Name:  Jacqueline Elliott  :  1950  MRN:  [id number]     PCP:  Nino Greenfield MD    Chief Complaint   Patient presents with    Neurologic Problem     Follow up for neuropathy - hands are numb and painful - started in one finger and now in all fingers-arms -legs and feet        HISTORY OF PRESENT ILLNESS:  Patient presents today for regular follow up, last seen 2023. She does feel like her hands are worse, she drops things b/c she can't feel them. They still work but they give her trouble. The right hand is the worst, right handed. Still able take care of her self. Since last seen patient did complete the EMG, see results below. Neurosurgery appointment Dr Levi Green on . They plan to discuss surgical options. She has not completed physical therapy nor seen an interventional list to help with some of her symptoms. She does c/o some neck pain, it does interfere with her sleep. Biofreeze helps. Gabapentin 100mg TID: helps a lot with the pain. Amitriptyline 10mg at night: Doing well. Pt fell about 1 month ago,tripped while taking out the trash, no injuries. EMG/NCS Impression: This study shows electrophysiologic evidence of a mild chronic C7 radiculopathy in the right upper extremity, without evidence of active denervation present by the slightly increased polyphasic motor unit potentials seen. There is no clear evidence of neuropathy, entrapment neuropathy, or other neuromuscular disease or lumbar radiculopathy the patient at this exam.  Clinical correlation and correlation with imaging modalities and metabolic studies may be of further diagnostic benefit in this patient. Also repeat studies and 6 to 12 months time may be of further diagnostic benefit.     Recap from last visit : Patient with problem of progressive numbness

## 2023-12-24 NOTE — PROCEDURES
ELECTRODIAGNOSTIC REPORT      Test Date:  2023    Patient: Antoinette Prakash : 1950 Physician: Mahnaz Lorenzana M.D. Sex: Female  < Ref Phys:      Technician: Daina Gaytan    Patient History: Patient is 71-year-old female with a history of numbness in both hands, and neck pain and radicular pain into her arms, and some numbness in both feet and the ankle to foot region. EMG study to rule out cervical radiculopathy, rule out lumbar radiculopathy, rule out neuropathy, without carpal tunnel syndrome, without other entrapment neuropathy, or other polyneuropathies. Neuro Exam: Patient has slightly hypoactive but symmetric reflexes throughout but no Babinski or clonus present. Patient has normal muscle strength and muscle bulk and tone in all extremities. Patient has slight decreased vibration and temperature in both feet to ankle level. Patient has borderline Tinel's over the median nerve bilaterally. Patient is clear nerves II through XII intact and mental status is normal and gait and station is normal.  Patient MRI scan of the cervical spine shows significant congenitally small canal, and some severe neuroforaminal disease and moderate severe stenosis from C5-C7. Impression: This study shows electrophysiologic evidence of a mild chronic C7 radiculopathy in the right upper extremity, without evidence of active denervation present by the slightly increased polyphasic motor unit potentials seen. There is no clear evidence of neuropathy, entrapment neuropathy, or other neuromuscular disease or lumbar radiculopathy the patient at this exam.  Clinical correlation and correlation with imaging modalities and metabolic studies may be of further diagnostic benefit in this patient. Also repeat studies and 6 to 12 months time may be of further diagnostic benefit.     EMG & NCV Findings:  Evaluation of the left Fibular motor and the right Fibular motor nerves showed normal distal onset latency
soft/nondistended/nontender

## 2024-02-19 DIAGNOSIS — G60.8 IDIOPATHIC SMALL FIBER SENSORY NEUROPATHY: ICD-10-CM

## 2024-02-19 RX ORDER — GABAPENTIN 100 MG/1
100 CAPSULE ORAL 3 TIMES DAILY
Qty: 270 CAPSULE | Refills: 1 | Status: SHIPPED | OUTPATIENT
Start: 2024-02-19 | End: 2024-08-17

## 2024-02-19 NOTE — TELEPHONE ENCOUNTER
Caller requests Refill of:  gabapentin (NEURONTIN) 100 MG capsule     Please send to:    [facility] [address] [place] [address] [address] [phone] - F [phone] [address] [facility] [address]  Phone: [phone] Fax: [phone]         Visit / Appointment History:  Future Appointment at [facility]:  3/13/2024     Last Appointment With PCP:  9/15/2023       Caller confirmed instructions and dosages as correct.    Caller was advised that Meds will be refilled as soon as possible, however there can be a 48-72 business hour turn around on refill requests.

## 2024-02-19 NOTE — TELEPHONE ENCOUNTER
Future Appointments:  Future Appointments   Date Time Provider Department Center   3/13/2024  1:30 PM Rin Calle MD [facility] BS AMB   8/19/2024 11:40 AM Patrick Calle MD [facility] BS AMB        Last Appointment With Me:  9/15/2023     Requested Prescriptions     Pending Prescriptions Disp Refills    gabapentin (NEURONTIN) 100 MG capsule 270 capsule 1     Sig: Take 1 capsule by mouth 3 times daily for 180 days. Max Daily Amount: 300 mg

## 2024-02-21 ENCOUNTER — TELEPHONE (OUTPATIENT)
Age: 74
End: 2024-02-21

## 2024-02-21 NOTE — TELEPHONE ENCOUNTER
Called pt, verified two pt identifiers.   Informed pt per Dr. Calle \" tramadol is controlled substance, not meant for definite use, need to follow back up with neurology regarding to neuropathy symptoms.   Pt verifies an understanding, states has an appointment with neurology April 11, 2024 and will discuss then. No further questions

## 2024-02-21 NOTE — TELEPHONE ENCOUNTER
Pt states that she is having hand pain after surgery.  They gave her gabapentin that is not working as well as the Tramadol they prescribed after surgery.    Pt states she can't get a hold of their office.      Pt is asking if Dr. Calle will prescribe Tramadol for her?    Send to [facility] #[phone]

## 2024-03-13 ENCOUNTER — OFFICE VISIT (OUTPATIENT)
Age: 74
End: 2024-03-13
Payer: MEDICARE

## 2024-03-13 VITALS
OXYGEN SATURATION: 98 % | HEART RATE: 86 BPM | RESPIRATION RATE: 20 BRPM | BODY MASS INDEX: 24.81 KG/M2 | TEMPERATURE: 98.1 F | DIASTOLIC BLOOD PRESSURE: 69 MMHG | HEIGHT: 60 IN | SYSTOLIC BLOOD PRESSURE: 109 MMHG | WEIGHT: 126.4 LBS

## 2024-03-13 DIAGNOSIS — E53.8 B12 DEFICIENCY: ICD-10-CM

## 2024-03-13 DIAGNOSIS — N18.31 STAGE 3A CHRONIC KIDNEY DISEASE (HCC): ICD-10-CM

## 2024-03-13 DIAGNOSIS — G63 POLYNEUROPATHY IN DISEASES CLASSIFIED ELSEWHERE (HCC): ICD-10-CM

## 2024-03-13 DIAGNOSIS — E55.9 VITAMIN D DEFICIENCY: ICD-10-CM

## 2024-03-13 DIAGNOSIS — R73.01 IFG (IMPAIRED FASTING GLUCOSE): ICD-10-CM

## 2024-03-13 DIAGNOSIS — G60.8 IDIOPATHIC SMALL FIBER SENSORY NEUROPATHY: ICD-10-CM

## 2024-03-13 DIAGNOSIS — Z00.00 MEDICARE ANNUAL WELLNESS VISIT, SUBSEQUENT: ICD-10-CM

## 2024-03-13 DIAGNOSIS — F51.01 PRIMARY INSOMNIA: ICD-10-CM

## 2024-03-13 DIAGNOSIS — E87.6 HYPOKALEMIA: ICD-10-CM

## 2024-03-13 DIAGNOSIS — I10 ESSENTIAL HYPERTENSION: Primary | ICD-10-CM

## 2024-03-13 DIAGNOSIS — M85.89 OSTEOPENIA OF MULTIPLE SITES: ICD-10-CM

## 2024-03-13 DIAGNOSIS — D89.89 OTHER SPECIFIED DISORDERS INVOLVING THE IMMUNE MECHANISM, NOT ELSEWHERE CLASSIFIED (HCC): ICD-10-CM

## 2024-03-13 DIAGNOSIS — M47.22 CERVICAL RADICULOPATHY DUE TO DEGENERATIVE JOINT DISEASE OF SPINE: ICD-10-CM

## 2024-03-13 PROBLEM — E11.42 DIABETIC PERIPHERAL NEUROPATHY ASSOCIATED WITH TYPE 2 DIABETES MELLITUS (HCC): Status: RESOLVED | Noted: 2023-04-20 | Resolved: 2024-03-13

## 2024-03-13 PROCEDURE — 3017F COLORECTAL CA SCREEN DOC REV: CPT | Performed by: INTERNAL MEDICINE

## 2024-03-13 PROCEDURE — G8399 PT W/DXA RESULTS DOCUMENT: HCPCS | Performed by: INTERNAL MEDICINE

## 2024-03-13 PROCEDURE — 1123F ACP DISCUSS/DSCN MKR DOCD: CPT | Performed by: INTERNAL MEDICINE

## 2024-03-13 PROCEDURE — 99214 OFFICE O/P EST MOD 30 MIN: CPT | Performed by: INTERNAL MEDICINE

## 2024-03-13 PROCEDURE — 3074F SYST BP LT 130 MM HG: CPT | Performed by: INTERNAL MEDICINE

## 2024-03-13 PROCEDURE — G8427 DOCREV CUR MEDS BY ELIG CLIN: HCPCS | Performed by: INTERNAL MEDICINE

## 2024-03-13 PROCEDURE — 3078F DIAST BP <80 MM HG: CPT | Performed by: INTERNAL MEDICINE

## 2024-03-13 PROCEDURE — 1036F TOBACCO NON-USER: CPT | Performed by: INTERNAL MEDICINE

## 2024-03-13 PROCEDURE — G8420 CALC BMI NORM PARAMETERS: HCPCS | Performed by: INTERNAL MEDICINE

## 2024-03-13 PROCEDURE — 1090F PRES/ABSN URINE INCON ASSESS: CPT | Performed by: INTERNAL MEDICINE

## 2024-03-13 PROCEDURE — G8484 FLU IMMUNIZE NO ADMIN: HCPCS | Performed by: INTERNAL MEDICINE

## 2024-03-13 PROCEDURE — G0439 PPPS, SUBSEQ VISIT: HCPCS | Performed by: INTERNAL MEDICINE

## 2024-03-13 ASSESSMENT — PATIENT HEALTH QUESTIONNAIRE - PHQ9
3. TROUBLE FALLING OR STAYING ASLEEP: 0
7. TROUBLE CONCENTRATING ON THINGS, SUCH AS READING THE NEWSPAPER OR WATCHING TELEVISION: 0
SUM OF ALL RESPONSES TO PHQ QUESTIONS 1-9: 2
SUM OF ALL RESPONSES TO PHQ QUESTIONS 1-9: 2
8. MOVING OR SPEAKING SO SLOWLY THAT OTHER PEOPLE COULD HAVE NOTICED. OR THE OPPOSITE, BEING SO FIGETY OR RESTLESS THAT YOU HAVE BEEN MOVING AROUND A LOT MORE THAN USUAL: 0
6. FEELING BAD ABOUT YOURSELF - OR THAT YOU ARE A FAILURE OR HAVE LET YOURSELF OR YOUR FAMILY DOWN: 0
2. FEELING DOWN, DEPRESSED OR HOPELESS: 1
SUM OF ALL RESPONSES TO PHQ QUESTIONS 1-9: 2
SUM OF ALL RESPONSES TO PHQ9 QUESTIONS 1 & 2: 2
9. THOUGHTS THAT YOU WOULD BE BETTER OFF DEAD, OR OF HURTING YOURSELF: 0
10. IF YOU CHECKED OFF ANY PROBLEMS, HOW DIFFICULT HAVE THESE PROBLEMS MADE IT FOR YOU TO DO YOUR WORK, TAKE CARE OF THINGS AT HOME, OR GET ALONG WITH OTHER PEOPLE: 0
SUM OF ALL RESPONSES TO PHQ QUESTIONS 1-9: 2
4. FEELING TIRED OR HAVING LITTLE ENERGY: 0
5. POOR APPETITE OR OVEREATING: 0
1. LITTLE INTEREST OR PLEASURE IN DOING THINGS: 1

## 2024-03-13 ASSESSMENT — LIFESTYLE VARIABLES
HOW MANY STANDARD DRINKS CONTAINING ALCOHOL DO YOU HAVE ON A TYPICAL DAY: PATIENT DOES NOT DRINK
HOW OFTEN DO YOU HAVE A DRINK CONTAINING ALCOHOL: NEVER

## 2024-03-13 NOTE — PROGRESS NOTES
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)  Stage 3a chronic kidney disease (HCC)  -     CBC; Future  -     Comprehensive Metabolic Panel; Future  -     Hemoglobin A1C; Future  -     Lipid Panel; Future  -     TSH; Future  -     Vitamin D 25 Hydroxy; Future  -     Vitamin B12; Future  Idiopathic small fiber sensory neuropathy  Medicare annual wellness visit, subsequent  Cervical radiculopathy due to degenerative joint disease of spine  Osteopenia of multiple sites  Primary insomnia  Vitamin D deficiency  -     CBC; Future  -     Comprehensive Metabolic Panel; Future  -     Hemoglobin A1C; Future  -     Lipid Panel; Future  -     TSH; Future  -     Vitamin D 25 Hydroxy; Future  -     Vitamin B12; Future  Hypokalemia  -     CBC; Future  -     Comprehensive Metabolic Panel; Future  -     Hemoglobin A1C; Future  -     Lipid Panel; Future  -     TSH; Future  -     Vitamin D 25 Hydroxy; Future  -     Vitamin B12; Future  IFG (impaired fasting glucose)  -     CBC; Future  -     Comprehensive Metabolic Panel; Future  -     Hemoglobin A1C; Future  -     Lipid Panel; Future  -     TSH; Future  -     Vitamin D 25 Hydroxy; Future  -     Vitamin B12; Future  B12 deficiency  -     CBC; Future  -     Comprehensive Metabolic Panel; Future  -     Hemoglobin A1C; Future  -     Lipid Panel; Future  -     TSH; Future  -     Vitamin D 25 Hydroxy; Future  -     Vitamin B12; Future  Polyneuropathy in diseases classified elsewhere (HCC)     Recommendations for Preventive Services Due: see orders and patient instructions/AVS.  Recommended screening schedule for the next 
\"Have you been to the ER, urgent care clinic since your last visit?  Hospitalized since your last visit?\"    NO    “Have you seen or consulted any other health care providers outside of [facility] since your last visit?”    NO        “Have you had a colorectal cancer screening such as a colonoscopy/FIT/Cologuard?    NO    Date of last Colonoscopy: 5/11/2018  No cologuard on file  No FIT/FOBT on file   No flexible sigmoidoscopy on file         Click Here for Release of Records Request    
own.  The documentation is accurate     Depression screen reviewed and negative.  Scribed by Manisha Hankins of [facility], as dictated by Dr. Rin Calle.    Current diagnosis and concerns discussed with pt at length. Pt understands risks and benefits or current treatment plan and medications, and accepts the treatment and medication with any possible risks. Pt asks appropriate questions, which were answered. Pt was instructed to call with any concerns or problems.    I have reviewed the note documented by the scribe. The services provided are my own. The documentation is accurate.

## 2024-03-14 LAB
25(OH)D3 SERPL-MCNC: 42.9 NG/ML (ref 30–100)
ALBUMIN SERPL-MCNC: 3.6 G/DL (ref 3.5–5)
ALBUMIN/GLOB SERPL: 1.1 (ref 1.1–2.2)
ALP SERPL-CCNC: 118 U/L (ref 45–117)
ALT SERPL-CCNC: 21 U/L (ref 12–78)
ANION GAP SERPL CALC-SCNC: 4 MMOL/L (ref 5–15)
AST SERPL-CCNC: 15 U/L (ref 15–37)
BILIRUB SERPL-MCNC: 0.5 MG/DL (ref 0.2–1)
BUN SERPL-MCNC: 26 MG/DL (ref 6–20)
BUN/CREAT SERPL: 25 (ref 12–20)
CALCIUM SERPL-MCNC: 9.4 MG/DL (ref 8.5–10.1)
CHLORIDE SERPL-SCNC: 106 MMOL/L (ref 97–108)
CHOLEST SERPL-MCNC: 189 MG/DL
CO2 SERPL-SCNC: 30 MMOL/L (ref 21–32)
COMMENT:: NORMAL
CREAT SERPL-MCNC: 1.04 MG/DL (ref 0.55–1.02)
ERYTHROCYTE [DISTWIDTH] IN BLOOD BY AUTOMATED COUNT: 12.4 % (ref 11.5–14.5)
EST. AVERAGE GLUCOSE BLD GHB EST-MCNC: 97 MG/DL
GLOBULIN SER CALC-MCNC: 3.3 G/DL (ref 2–4)
GLUCOSE SERPL-MCNC: 116 MG/DL (ref 65–100)
HBA1C MFR BLD: 5 % (ref 4–5.6)
HCT VFR BLD AUTO: 38.1 % (ref 35–47)
HDLC SERPL-MCNC: 54 MG/DL
HDLC SERPL: 3.5 (ref 0–5)
HGB BLD-MCNC: 12.5 G/DL (ref 11.5–16)
LDLC SERPL CALC-MCNC: 126 MG/DL (ref 0–100)
MCH RBC QN AUTO: 30 PG (ref 26–34)
MCHC RBC AUTO-ENTMCNC: 32.8 G/DL (ref 30–36.5)
MCV RBC AUTO: 91.4 FL (ref 80–99)
NRBC # BLD: 0 K/UL (ref 0–0.01)
NRBC BLD-RTO: 0 PER 100 WBC
PLATELET # BLD AUTO: 274 K/UL (ref 150–400)
PMV BLD AUTO: 12.3 FL (ref 8.9–12.9)
POTASSIUM SERPL-SCNC: 4 MMOL/L (ref 3.5–5.1)
PROT SERPL-MCNC: 6.9 G/DL (ref 6.4–8.2)
RBC # BLD AUTO: 4.17 M/UL (ref 3.8–5.2)
SODIUM SERPL-SCNC: 140 MMOL/L (ref 136–145)
SPECIMEN HOLD: NORMAL
TRIGL SERPL-MCNC: 45 MG/DL
TSH SERPL DL<=0.05 MIU/L-ACNC: 1.57 UIU/ML (ref 0.36–3.74)
VIT B12 SERPL-MCNC: 408 PG/ML (ref 193–986)
VLDLC SERPL CALC-MCNC: 9 MG/DL
WBC # BLD AUTO: 3.6 K/UL (ref 3.6–11)
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
## 2024-04-22 RX ORDER — CHLORTHALIDONE 25 MG/1
12.5 TABLET ORAL DAILY
Qty: 45 TABLET | Refills: 1 | Status: SHIPPED | OUTPATIENT
Start: 2024-04-22

## 2024-04-22 NOTE — TELEPHONE ENCOUNTER
Future Appointments:  Future Appointments   Date Time Provider Department Center   8/19/2024 11:40 AM Patrick Calle MD [facility] BS AMB   9/18/2024  1:30 PM Rin Calle MD [facility] BS AMB        Last Appointment With Me:  3/13/2024     Requested Prescriptions     Pending Prescriptions Disp Refills    chlorthalidone (HYGROTON) 25 MG tablet 45 tablet 1     Sig: Take 0.5 tablets by mouth daily

## 2024-05-13 NOTE — TELEPHONE ENCOUNTER
Noted
Patient called the clinic today with concerns for a rash that has developed on her bra line and iin the creases of her groin area.   Noticed last Thursday.   Has been applying cortizone but not helping much.  Scheduled for tomorrow with Dr. Reyes.   Sounds potentially like a yeast infection of her skin folds    Reason for Disposition   [1] MODERATE-SEVERE local itching (i.e., interferes with work, school, activities) AND [2] not improved after 24 hours of hydrocortisone cream   Widespread itching and cause unknown and present > 48 hours    Additional Information   Negative: MODERATE-SEVERE widespread itching (i.e., interferes with sleep, normal activities or school) and not improved after 24 hours of itching Care Advice   Negative: MODERATE-SEVERE widespread itching (i.e., interferes with sleep, normal activities or school) AND pregnant   Negative: Patient wants to be seen   Negative: Patient sounds very sick or weak to the triager   Negative: Insect bites suspected   Negative: Hives suspected (urticaria, itchy pink bumps with pale centers that come and go over minutes to hours)   Negative: Widespread rash also present   Negative: Itching in just one area or spot   Negative: Life-threatening reaction (anaphylaxis) in the past to similar substance (e.g., food, insect bite/sting, chemical, etc.) and < 2 hours since exposure   Negative: Difficulty breathing or wheezing   Negative: Difficulty swallowing or slurred speech and sudden onset   Negative: Sounds like a life-threatening emergency to the triager    Protocols used: Itching - Ifilxceim-Z-ZU, Itching - Widespread-A-OH    Melanie Palomo RN on 5/13/2024 at 10:43 AM    
Patient called to speak with Nurse because she has been struggling with the right side of her body. Patient said her right leg is extremely swollen, hand is numb & feels cold, and the skin on her wrist is lightening in color. Please advise.      [phone]
Spoke with patient. Verified name/. Patient c/o numbness in both fee, right hand feeling cold/numb, and her described on the right hand above the wrist is a light color. Patient also stated the right side seems swollen and she is getting aches/pains. Patient scheduled for Skin biopsy on Monday with Dr. Syed Maynard. I advised patient if symptoms get worse or any new symptoms appear to seek medical attention. Patient verbalized understanding.
No

## 2024-05-16 RX ORDER — AMITRIPTYLINE HYDROCHLORIDE 10 MG/1
10 TABLET, FILM COATED ORAL NIGHTLY
Qty: 90 TABLET | Refills: 3 | Status: SHIPPED | OUTPATIENT
Start: 2024-05-16

## 2024-05-16 RX ORDER — AMLODIPINE BESYLATE AND BENAZEPRIL HYDROCHLORIDE 10; 20 MG/1; MG/1
1 CAPSULE ORAL DAILY
Qty: 90 CAPSULE | Refills: 1 | Status: SHIPPED | OUTPATIENT
Start: 2024-05-16

## 2024-05-21 DIAGNOSIS — G60.8 IDIOPATHIC SMALL FIBER SENSORY NEUROPATHY: ICD-10-CM

## 2024-05-22 RX ORDER — GABAPENTIN 100 MG/1
CAPSULE ORAL
Qty: 270 CAPSULE | Refills: 0 | Status: SHIPPED | OUTPATIENT
Start: 2024-05-22 | End: 2024-08-20

## 2024-06-18 ENCOUNTER — OFFICE VISIT (OUTPATIENT)
Age: 74
End: 2024-06-18
Payer: MEDICARE

## 2024-06-18 VITALS
DIASTOLIC BLOOD PRESSURE: 72 MMHG | RESPIRATION RATE: 14 BRPM | HEIGHT: 60 IN | OXYGEN SATURATION: 98 % | WEIGHT: 127.4 LBS | HEART RATE: 91 BPM | SYSTOLIC BLOOD PRESSURE: 116 MMHG | BODY MASS INDEX: 25.01 KG/M2

## 2024-06-18 DIAGNOSIS — I10 ESSENTIAL HYPERTENSION: ICD-10-CM

## 2024-06-18 DIAGNOSIS — T14.8XXA ABRASION: Primary | ICD-10-CM

## 2024-06-18 DIAGNOSIS — B37.9 YEAST INFECTION: ICD-10-CM

## 2024-06-18 DIAGNOSIS — L29.9 ITCH: ICD-10-CM

## 2024-06-18 PROCEDURE — G8427 DOCREV CUR MEDS BY ELIG CLIN: HCPCS | Performed by: INTERNAL MEDICINE

## 2024-06-18 PROCEDURE — 1036F TOBACCO NON-USER: CPT | Performed by: INTERNAL MEDICINE

## 2024-06-18 PROCEDURE — 99213 OFFICE O/P EST LOW 20 MIN: CPT | Performed by: INTERNAL MEDICINE

## 2024-06-18 PROCEDURE — 3017F COLORECTAL CA SCREEN DOC REV: CPT | Performed by: INTERNAL MEDICINE

## 2024-06-18 PROCEDURE — 1123F ACP DISCUSS/DSCN MKR DOCD: CPT | Performed by: INTERNAL MEDICINE

## 2024-06-18 PROCEDURE — G8399 PT W/DXA RESULTS DOCUMENT: HCPCS | Performed by: INTERNAL MEDICINE

## 2024-06-18 PROCEDURE — 1090F PRES/ABSN URINE INCON ASSESS: CPT | Performed by: INTERNAL MEDICINE

## 2024-06-18 PROCEDURE — 3074F SYST BP LT 130 MM HG: CPT | Performed by: INTERNAL MEDICINE

## 2024-06-18 PROCEDURE — G8420 CALC BMI NORM PARAMETERS: HCPCS | Performed by: INTERNAL MEDICINE

## 2024-06-18 PROCEDURE — 3078F DIAST BP <80 MM HG: CPT | Performed by: INTERNAL MEDICINE

## 2024-06-18 RX ORDER — FLUCONAZOLE 150 MG/1
150 TABLET ORAL WEEKLY
Qty: 2 TABLET | Refills: 0 | Status: SHIPPED | OUTPATIENT
Start: 2024-06-18

## 2024-06-18 RX ORDER — HYDROXYZINE HYDROCHLORIDE 25 MG/1
25 TABLET, FILM COATED ORAL NIGHTLY
Qty: 30 TABLET | Refills: 0 | Status: SHIPPED | OUTPATIENT
Start: 2024-06-18 | End: 2024-07-18

## 2024-06-18 ASSESSMENT — PATIENT HEALTH QUESTIONNAIRE - PHQ9
1. LITTLE INTEREST OR PLEASURE IN DOING THINGS: NOT AT ALL
7. TROUBLE CONCENTRATING ON THINGS, SUCH AS READING THE NEWSPAPER OR WATCHING TELEVISION: NOT AT ALL
5. POOR APPETITE OR OVEREATING: NOT AT ALL
4. FEELING TIRED OR HAVING LITTLE ENERGY: NOT AT ALL
2. FEELING DOWN, DEPRESSED OR HOPELESS: NOT AT ALL
10. IF YOU CHECKED OFF ANY PROBLEMS, HOW DIFFICULT HAVE THESE PROBLEMS MADE IT FOR YOU TO DO YOUR WORK, TAKE CARE OF THINGS AT HOME, OR GET ALONG WITH OTHER PEOPLE: NOT DIFFICULT AT ALL
SUM OF ALL RESPONSES TO PHQ9 QUESTIONS 1 & 2: 0
SUM OF ALL RESPONSES TO PHQ QUESTIONS 1-9: 0
6. FEELING BAD ABOUT YOURSELF - OR THAT YOU ARE A FAILURE OR HAVE LET YOURSELF OR YOUR FAMILY DOWN: NOT AT ALL
SUM OF ALL RESPONSES TO PHQ QUESTIONS 1-9: 0
SUM OF ALL RESPONSES TO PHQ QUESTIONS 1-9: 0
8. MOVING OR SPEAKING SO SLOWLY THAT OTHER PEOPLE COULD HAVE NOTICED. OR THE OPPOSITE, BEING SO FIGETY OR RESTLESS THAT YOU HAVE BEEN MOVING AROUND A LOT MORE THAN USUAL: NOT AT ALL
9. THOUGHTS THAT YOU WOULD BE BETTER OFF DEAD, OR OF HURTING YOURSELF: NOT AT ALL
SUM OF ALL RESPONSES TO PHQ QUESTIONS 1-9: 0
3. TROUBLE FALLING OR STAYING ASLEEP: NOT AT ALL

## 2024-06-18 ASSESSMENT — ENCOUNTER SYMPTOMS: SHORTNESS OF BREATH: 0

## 2024-06-18 NOTE — PROGRESS NOTES
HISTORY OF PRESENT ILLNESS  Daksha Michael is a 73 y.o. female.  HPI    She presents for acute care    She reports vaginal irritation,  x 1 month--initially she had a lot of vaginal itching like a yeast infection she was scratching it and caused a abrasion in the labial area   It started with a nail scratch 1 month ago--she used Monistat for the yeast infection symptoms which improved but she still gets some itching there she notes recently over the last week or so that it has become more  swollen and sore  Denies fever, chills  Initially she admits to vaginal discharge this is resolved no burning with urination no urinary symptoms  Will give diflucan, hydroxyzine for itch  Advised to use Desitin cream to use each time after she uses bathroom as a barrier cream    Updated shingles - 1st dose 4/24,2nd dose-6/24, both completed    BP today 116/72    Wt today 127 lbs  Patient Active Problem List   Diagnosis    Osteopenia    Vitiligo    Essential hypertension    Bursitis of shoulder, right    Symptomatic menopausal or female climacteric states    Cervical radiculopathy due to degenerative joint disease of spine    Immune-mediated neuropathy (HCC)    B12 deficiency    Bilateral carpal tunnel syndrome    Idiopathic small fiber sensory neuropathy    Chronic renal disease, stage III (HCC) [227209]    Lumbosacral radiculopathy due to degenerative joint disease of spine    Neuropathy    Other spondylosis with radiculopathy, cervical region     Current Outpatient Medications   Medication Sig Dispense Refill    fluconazole (DIFLUCAN) 150 MG tablet Take 1 tablet by mouth once a week 2 tablet 0    hydrOXYzine HCl (ATARAX) 25 MG tablet Take 1 tablet by mouth nightly 30 tablet 0    gabapentin (NEURONTIN) 100 MG capsule TAKE 1 CAPSULE BY MOUTH 3 TIMES DAILY. MAX DAILY AMOUNT: 300  capsule 0    amLODIPine-benazepril (LOTREL) 10-20 MG per capsule TAKE 1 CAPSULE EVERY DAY 90 capsule 1    amitriptyline (ELAVIL) 10 MG

## 2024-06-18 NOTE — PROGRESS NOTES
\"Have you been to the ER, urgent care clinic since your last visit?  Hospitalized since your last visit?\"    NO    “Have you seen or consulted any other health care providers outside of [facility] since your last visit?”    NO        “Have you had a colorectal cancer screening such as a colonoscopy/FIT/Cologuard?    [facility]- 2023/ Early 2024    Date of last Colonoscopy: 5/11/2018  No cologuard on file  No FIT/FOBT on file   No flexible sigmoidoscopy on file         Click Here for Release of Records Request

## 2024-06-21 ENCOUNTER — TELEPHONE (OUTPATIENT)
Age: 74
End: 2024-06-21

## 2024-06-21 NOTE — TELEPHONE ENCOUNTER
Patient states she needs a call back in reference to getting advise on Plan of care for her Amitriptyline (ELAVIL) medication & plan of continuing to take or not. Please call to discuss & advise. Thank you

## 2024-08-19 ENCOUNTER — OFFICE VISIT (OUTPATIENT)
Age: 74
End: 2024-08-19
Payer: MEDICARE

## 2024-08-19 VITALS
OXYGEN SATURATION: 96 % | SYSTOLIC BLOOD PRESSURE: 98 MMHG | DIASTOLIC BLOOD PRESSURE: 62 MMHG | RESPIRATION RATE: 16 BRPM | TEMPERATURE: 98 F | BODY MASS INDEX: 25.13 KG/M2 | HEART RATE: 87 BPM | HEIGHT: 60 IN | WEIGHT: 128 LBS

## 2024-08-19 DIAGNOSIS — M47.27 LUMBOSACRAL RADICULOPATHY DUE TO DEGENERATIVE JOINT DISEASE OF SPINE: ICD-10-CM

## 2024-08-19 DIAGNOSIS — M96.1 CERVICAL POST-LAMINECTOMY SYNDROME: ICD-10-CM

## 2024-08-19 DIAGNOSIS — M47.22 CERVICAL RADICULOPATHY DUE TO DEGENERATIVE JOINT DISEASE OF SPINE: Primary | ICD-10-CM

## 2024-08-19 DIAGNOSIS — G60.8 IDIOPATHIC SMALL FIBER SENSORY NEUROPATHY: ICD-10-CM

## 2024-08-19 PROCEDURE — 3017F COLORECTAL CA SCREEN DOC REV: CPT | Performed by: PSYCHIATRY & NEUROLOGY

## 2024-08-19 PROCEDURE — G8419 CALC BMI OUT NRM PARAM NOF/U: HCPCS | Performed by: PSYCHIATRY & NEUROLOGY

## 2024-08-19 PROCEDURE — 3078F DIAST BP <80 MM HG: CPT | Performed by: PSYCHIATRY & NEUROLOGY

## 2024-08-19 PROCEDURE — G8427 DOCREV CUR MEDS BY ELIG CLIN: HCPCS | Performed by: PSYCHIATRY & NEUROLOGY

## 2024-08-19 PROCEDURE — G8399 PT W/DXA RESULTS DOCUMENT: HCPCS | Performed by: PSYCHIATRY & NEUROLOGY

## 2024-08-19 PROCEDURE — 1036F TOBACCO NON-USER: CPT | Performed by: PSYCHIATRY & NEUROLOGY

## 2024-08-19 PROCEDURE — 3074F SYST BP LT 130 MM HG: CPT | Performed by: PSYCHIATRY & NEUROLOGY

## 2024-08-19 PROCEDURE — 1123F ACP DISCUSS/DSCN MKR DOCD: CPT | Performed by: PSYCHIATRY & NEUROLOGY

## 2024-08-19 PROCEDURE — 99214 OFFICE O/P EST MOD 30 MIN: CPT | Performed by: PSYCHIATRY & NEUROLOGY

## 2024-08-19 PROCEDURE — 1090F PRES/ABSN URINE INCON ASSESS: CPT | Performed by: PSYCHIATRY & NEUROLOGY

## 2024-08-19 RX ORDER — GABAPENTIN 100 MG/1
100 CAPSULE ORAL 3 TIMES DAILY
Qty: 270 CAPSULE | Refills: 1 | Status: SHIPPED | OUTPATIENT
Start: 2024-08-19 | End: 2025-02-15

## 2024-08-19 ASSESSMENT — PATIENT HEALTH QUESTIONNAIRE - PHQ9
SUM OF ALL RESPONSES TO PHQ QUESTIONS 1-9: 1
1. LITTLE INTEREST OR PLEASURE IN DOING THINGS: SEVERAL DAYS
2. FEELING DOWN, DEPRESSED OR HOPELESS: NOT AT ALL
SUM OF ALL RESPONSES TO PHQ QUESTIONS 1-9: 1
SUM OF ALL RESPONSES TO PHQ9 QUESTIONS 1 & 2: 1

## 2024-08-19 NOTE — TELEPHONE ENCOUNTER
gabapentin (NEURONTIN) 100 MG capsule    Refill to [facility] mail order 90 day

## 2024-08-19 NOTE — TELEPHONE ENCOUNTER
Future Appointments:  Future Appointments   Date Time Provider Department Center   8/19/2024 11:40 AM Patrick Calle MD [facility]   9/18/2024  1:30 PM Rin Calle MD [facility] BSPsychiatric DEP        Last Appointment With Me:  6/18/2024     Requested Prescriptions     Pending Prescriptions Disp Refills    gabapentin (NEURONTIN) 100 MG capsule 270 capsule 1     Sig: Take 1 capsule by mouth 3 times daily for 180 days. Max Daily Amount: 300 mg

## 2024-08-19 NOTE — PROGRESS NOTES
tablet, Rfl: 1    Multiple Vitamin (MULTIVITAMIN PO), Take by mouth daily, Disp: , Rfl:     gabapentin (NEURONTIN) 100 MG capsule, Take 1 capsule by mouth 3 times daily for 180 days. Max Daily Amount: 300 mg, Disp: 270 capsule, Rfl: 1        Allergies   Allergen Reactions    Tramadol Nausea Only      MRI Results (most recent):  @[facility](UAM7393:1)@    @[facility]([id number]:1)@  Review of Systems:  A comprehensive review of systems was negative except for: Constitutional: positive for fatigue and malaise  Musculoskeletal: positive for arthralgias, myalgias, neck pain, and stiff joints  Neurological: positive for paresthesia, weakness, and neck pain  Behvioral/Psych: positive for anxiety and depression   Vitals:    08/19/24 1142   BP: 98/62   Site: Left Upper Arm   Position: Sitting   Cuff Size: Medium Adult   Pulse: 87   Resp: 16   Temp: 98 °F (36.7 °C)   TempSrc: Temporal   SpO2: 96%   Weight: 58.1 kg (128 lb)   Height: 1.524 m (5')     Objective:     I    NEUROLOGICAL EXAM:         Appearance:   The patient is well developed, well nourished, provides a coherent history and is in no acute distress.        Mental Status:  Oriented to time, place and person, and the president, cognitive function is normal and speech is fluent and no aphasia or dysarthria. Mood and affect appropriate.        Cranial Nerves:    Intact visual fields. Fundi are benign, disc are flat, no lesions seen on funduscopy. RODO, EOM's full, no nystagmus, no ptosis. Facial sensation is normal.  Corneal reflexes are not tested. Facial movement is symmetric. Hearing is normal bilaterally. Palate is midline with normal sternocleidomastoid and trapezius muscles are normal. Tongue is midline.   Neck without meningismus or bruits   Temporal arteries are not tender or enlarged   TMJ areas are not tender on palpation        Motor:   5/5 strength in upper and lower proximal and distal muscles though she tends to favor her right side slightly..

## 2024-09-16 RX ORDER — CHLORTHALIDONE 25 MG/1
12.5 TABLET ORAL DAILY
Qty: 45 TABLET | Refills: 0 | Status: SHIPPED | OUTPATIENT
Start: 2024-09-16

## 2024-09-18 ENCOUNTER — OFFICE VISIT (OUTPATIENT)
Age: 74
End: 2024-09-18
Payer: MEDICARE

## 2024-09-18 VITALS
HEIGHT: 60 IN | HEART RATE: 88 BPM | WEIGHT: 128.2 LBS | RESPIRATION RATE: 15 BRPM | DIASTOLIC BLOOD PRESSURE: 76 MMHG | SYSTOLIC BLOOD PRESSURE: 100 MMHG | OXYGEN SATURATION: 96 % | BODY MASS INDEX: 25.17 KG/M2

## 2024-09-18 DIAGNOSIS — N18.31 STAGE 3A CHRONIC KIDNEY DISEASE (HCC): ICD-10-CM

## 2024-09-18 DIAGNOSIS — E78.5 DYSLIPIDEMIA: ICD-10-CM

## 2024-09-18 DIAGNOSIS — G62.9 NEUROPATHY: ICD-10-CM

## 2024-09-18 DIAGNOSIS — M85.89 OSTEOPENIA OF MULTIPLE SITES: ICD-10-CM

## 2024-09-18 DIAGNOSIS — R73.01 IFG (IMPAIRED FASTING GLUCOSE): ICD-10-CM

## 2024-09-18 DIAGNOSIS — I10 ESSENTIAL HYPERTENSION: Primary | ICD-10-CM

## 2024-09-18 DIAGNOSIS — Z23 NEEDS FLU SHOT: ICD-10-CM

## 2024-09-18 PROCEDURE — G8428 CUR MEDS NOT DOCUMENT: HCPCS | Performed by: INTERNAL MEDICINE

## 2024-09-18 PROCEDURE — 1090F PRES/ABSN URINE INCON ASSESS: CPT | Performed by: INTERNAL MEDICINE

## 2024-09-18 PROCEDURE — 99214 OFFICE O/P EST MOD 30 MIN: CPT | Performed by: INTERNAL MEDICINE

## 2024-09-18 PROCEDURE — G8419 CALC BMI OUT NRM PARAM NOF/U: HCPCS | Performed by: INTERNAL MEDICINE

## 2024-09-18 PROCEDURE — 90653 IIV ADJUVANT VACCINE IM: CPT | Performed by: INTERNAL MEDICINE

## 2024-09-18 PROCEDURE — 1123F ACP DISCUSS/DSCN MKR DOCD: CPT | Performed by: INTERNAL MEDICINE

## 2024-09-18 PROCEDURE — G8399 PT W/DXA RESULTS DOCUMENT: HCPCS | Performed by: INTERNAL MEDICINE

## 2024-09-18 PROCEDURE — 3017F COLORECTAL CA SCREEN DOC REV: CPT | Performed by: INTERNAL MEDICINE

## 2024-09-18 PROCEDURE — 3074F SYST BP LT 130 MM HG: CPT | Performed by: INTERNAL MEDICINE

## 2024-09-18 PROCEDURE — 1036F TOBACCO NON-USER: CPT | Performed by: INTERNAL MEDICINE

## 2024-09-18 PROCEDURE — PBSHW INFLUENZA, FLUAD TRIVALENT, (AGE 65 Y+), IM, PRESERVATIVE FREE, 0.5ML: Performed by: INTERNAL MEDICINE

## 2024-09-18 PROCEDURE — 3078F DIAST BP <80 MM HG: CPT | Performed by: INTERNAL MEDICINE

## 2024-09-18 SDOH — ECONOMIC STABILITY: FOOD INSECURITY: WITHIN THE PAST 12 MONTHS, YOU WORRIED THAT YOUR FOOD WOULD RUN OUT BEFORE YOU GOT MONEY TO BUY MORE.: NEVER TRUE

## 2024-09-18 SDOH — ECONOMIC STABILITY: FOOD INSECURITY: WITHIN THE PAST 12 MONTHS, THE FOOD YOU BOUGHT JUST DIDN'T LAST AND YOU DIDN'T HAVE MONEY TO GET MORE.: NEVER TRUE

## 2024-09-18 SDOH — ECONOMIC STABILITY: INCOME INSECURITY: HOW HARD IS IT FOR YOU TO PAY FOR THE VERY BASICS LIKE FOOD, HOUSING, MEDICAL CARE, AND HEATING?: NOT HARD AT ALL

## 2024-09-19 LAB
ALBUMIN SERPL-MCNC: 3.7 G/DL (ref 3.5–5)
ALBUMIN/GLOB SERPL: 1.1 (ref 1.1–2.2)
ALP SERPL-CCNC: 120 U/L (ref 45–117)
ALT SERPL-CCNC: 27 U/L (ref 12–78)
ANION GAP SERPL CALC-SCNC: 3 MMOL/L (ref 2–12)
AST SERPL-CCNC: 21 U/L (ref 15–37)
BILIRUB SERPL-MCNC: 0.4 MG/DL (ref 0.2–1)
BUN SERPL-MCNC: 22 MG/DL (ref 6–20)
BUN/CREAT SERPL: 19 (ref 12–20)
CALCIUM SERPL-MCNC: 9.6 MG/DL (ref 8.5–10.1)
CHLORIDE SERPL-SCNC: 104 MMOL/L (ref 97–108)
CHOLEST SERPL-MCNC: 185 MG/DL
CO2 SERPL-SCNC: 32 MMOL/L (ref 21–32)
CREAT SERPL-MCNC: 1.17 MG/DL (ref 0.55–1.02)
ERYTHROCYTE [DISTWIDTH] IN BLOOD BY AUTOMATED COUNT: 12.5 % (ref 11.5–14.5)
EST. AVERAGE GLUCOSE BLD GHB EST-MCNC: 88 MG/DL
GLOBULIN SER CALC-MCNC: 3.5 G/DL (ref 2–4)
GLUCOSE SERPL-MCNC: 112 MG/DL (ref 65–100)
HBA1C MFR BLD: 4.7 % (ref 4–5.6)
HCT VFR BLD AUTO: 40.2 % (ref 35–47)
HDLC SERPL-MCNC: 51 MG/DL
HDLC SERPL: 3.6 (ref 0–5)
HGB BLD-MCNC: 12.6 G/DL (ref 11.5–16)
LDLC SERPL CALC-MCNC: 112 MG/DL (ref 0–100)
MCH RBC QN AUTO: 29.2 PG (ref 26–34)
MCHC RBC AUTO-ENTMCNC: 31.3 G/DL (ref 30–36.5)
MCV RBC AUTO: 93.1 FL (ref 80–99)
NRBC # BLD: 0 K/UL (ref 0–0.01)
NRBC BLD-RTO: 0 PER 100 WBC
PLATELET # BLD AUTO: 259 K/UL (ref 150–400)
PMV BLD AUTO: 12.6 FL (ref 8.9–12.9)
POTASSIUM SERPL-SCNC: 3.7 MMOL/L (ref 3.5–5.1)
PROT SERPL-MCNC: 7.2 G/DL (ref 6.4–8.2)
RBC # BLD AUTO: 4.32 M/UL (ref 3.8–5.2)
SODIUM SERPL-SCNC: 139 MMOL/L (ref 136–145)
TRIGL SERPL-MCNC: 110 MG/DL
VLDLC SERPL CALC-MCNC: 22 MG/DL
WBC # BLD AUTO: 3.7 K/UL (ref 3.6–11)

## 2024-09-23 DIAGNOSIS — I10 ESSENTIAL HYPERTENSION: Primary | ICD-10-CM

## 2024-09-23 DIAGNOSIS — F32.89 OTHER SPECIFIED DEPRESSIVE EPISODES: ICD-10-CM

## 2024-09-23 RX ORDER — AMLODIPINE AND BENAZEPRIL HYDROCHLORIDE 10; 20 MG/1; MG/1
1 CAPSULE ORAL DAILY
Qty: 90 CAPSULE | Refills: 0 | Status: SHIPPED | OUTPATIENT
Start: 2024-09-23

## 2024-09-23 RX ORDER — AMITRIPTYLINE HYDROCHLORIDE 10 MG/1
10 TABLET ORAL NIGHTLY
Qty: 90 TABLET | Refills: 1 | Status: SHIPPED | OUTPATIENT
Start: 2024-09-23

## 2024-11-29 RX ORDER — CHLORTHALIDONE 25 MG/1
12.5 TABLET ORAL DAILY
Qty: 45 TABLET | Refills: 1 | Status: SHIPPED | OUTPATIENT
Start: 2024-11-29

## 2025-03-04 DIAGNOSIS — G60.8 IDIOPATHIC SMALL FIBER SENSORY NEUROPATHY: ICD-10-CM

## 2025-03-04 RX ORDER — GABAPENTIN 100 MG/1
100 CAPSULE ORAL 3 TIMES DAILY
Qty: 270 CAPSULE | Refills: 0 | Status: SHIPPED | OUTPATIENT
Start: 2025-03-04 | End: 2025-08-31

## 2025-03-04 NOTE — TELEPHONE ENCOUNTER
gabapentin (NEURONTIN) 100 MG capsule     Refill 90 day mail order [facility]

## 2025-03-04 NOTE — TELEPHONE ENCOUNTER
Future Appointments:  Future Appointments   Date Time Provider Department Center   3/18/2025 11:45 AM Rin Calle MD [facility] BS ECC DEP        Last Appointment With Me:  9/18/2024     Requested Prescriptions     Pending Prescriptions Disp Refills    gabapentin (NEURONTIN) 100 MG capsule 270 capsule 1     Sig: Take 1 capsule by mouth 3 times daily for 180 days. Max Daily Amount: 300 mg

## 2025-03-11 NOTE — PROGRESS NOTES
HISTORY OF PRESENT ILLNESS  Daksha Michael is a 74 y.o. female.  HPI  Last here 9/18/24. Pt is here to f/u on chronic conditions.    Did medicare awv 3/18/25 (last 1 was completed March 13, 2024)       Has a history of hypertension  No home BP readings for review  BP today is 138/71  Continues lotrel 10-20mg and chlorthalidone 25mg half tab daily, did not take them this morning   Not taking klor-con, of note potassium okay on last labs     Wt today is 129 lbs, stable since lov   Her wt is in the nl range   Previously lost ~10 lbs d/t to hand pain and stress, stating she really just does not feel like cooking or eating when her hands hurt however she has gained weight back since then and weight is overall stable       Reviewed labs   Ordered labs    Patient with very mild CKD with creatinine running around 1.0-1.1 with GFR just below 60 discussed this with her     She is taking amitriptyline 10 mg nightly this works well helps with sleep and myalgias happy with dose        Saw Dr. Flores (cardio) for her palpitations in the past  Last visit was 10/3/17  Her sx have since resolved         Longstanding history of paresthesias neuropathy  Last visit Dr Calle (neuro) for cervical radiculopathy and paresthesias in general  Lov 8/19/24, she will schedule f/u      She saw Dr. Lema (neurosurg) cervical radiculopathyand ultimately had C-spine surgery 12/11/23  Lov 12/24   takes gabapentin 100 mg TID for neuropathy, she does not have this currently is picking up today this helps her symptoms  Nerve biopsy was normal previously        She had an EMG in June 24 showing a C-spine radiculopathy not much carpal tunnel    She saw Dr Frankenhoff (ortho) for numbness in hands and feet  Lov 1/16/25  Reviewed note:  Assessment:  Chronic paresthesias bilateral upper extremities    Plan:  Given that the carpal tunnel injection for her mild right carpal tunnel syndrome only seen on the second EMG did not help her symptoms at all

## 2025-03-13 DIAGNOSIS — G60.8 IDIOPATHIC SMALL FIBER SENSORY NEUROPATHY: ICD-10-CM

## 2025-03-13 NOTE — TELEPHONE ENCOUNTER
Caller requests Refill of:  gabapentin (NEURONTIN) 100 MG capsule     Pt states mail in order was sent to [place] in error by pharmacy and would like RX sent to local pharmacy. Pt states all out of medication.     Please send to:    [facility] [address] [place] [address] - [address] [place] [phone] - F [phone] [address] [facility] [address]  Phone: [phone] Fax: [phone]         Visit / Appointment History:  Future Appointment at [facility]:  3/18/2025   Last Appointment With PCP:  9/18/2024       Caller confirmed instructions and dosages as correct.    Caller was advised that Meds will be refilled as soon as possible, however there can be a 48-72 business hour turn around on refill requests.

## 2025-03-13 NOTE — TELEPHONE ENCOUNTER
Future Appointments:  Future Appointments   Date Time Provider Department Center   3/18/2025 11:45 AM Rin Calle MD [facility] BS [facility] DEP        Last Appointment With Me:  9/18/2024     Requested Prescriptions     Pending Prescriptions Disp Refills    gabapentin (NEURONTIN) 100 MG capsule 270 capsule 0     Sig: Take 1 capsule by mouth 3 times daily for 180 days. Max Daily Amount: 300 mg

## 2025-03-14 ENCOUNTER — TELEPHONE (OUTPATIENT)
Age: 75
End: 2025-03-14

## 2025-03-14 RX ORDER — GABAPENTIN 100 MG/1
100 CAPSULE ORAL 3 TIMES DAILY
Qty: 270 CAPSULE | Refills: 0 | Status: SHIPPED | OUTPATIENT
Start: 2025-03-14 | End: 2025-09-10

## 2025-03-14 NOTE — TELEPHONE ENCOUNTER
Sent to [facility] pharmacy:    gabapentin (NEURONTIN) 100 MG capsule    [facility] [address] [place] [address] [address] [phone] - F [phone]

## 2025-03-16 NOTE — PROGRESS NOTES
Medicare Annual Wellness Visit    Daksha Alec is here for Medicare AWV and Numbness (Worsening numbness in fingers)    Assessment & Plan   Essential hypertension  -     amLODIPine-benazepril (LOTREL) 10-20 MG per capsule; Take 1 capsule by mouth daily, Disp-90 capsule, R-0Normal  -     TSH; Future  -     Hemoglobin A1C; Future  Medicare annual wellness visit, subsequent  Other specified depressive episodes  -     amitriptyline (ELAVIL) 10 MG tablet; Take 1 tablet by mouth nightly, Disp-90 tablet, R-1Normal  Neuropathy  Stage 3a chronic kidney disease (HCC)  -     Basic Metabolic Panel; Future  -     TSH; Future  -     Hemoglobin A1C; Future  B12 deficiency  Osteopenia of multiple sites  IFG (impaired fasting glucose)  -     TSH; Future  -     Hemoglobin A1C; Future  Idiopathic small fiber sensory neuropathy  Primary insomnia        Return in about 6 months (around 9/18/2025).     Subjective       Patient's complete Health Risk Assessment and screening values have been reviewed and are found in Flowsheets. The following problems were reviewed today and where indicated follow up appointments were made and/or referrals ordered.    Positive Risk Factor Screenings with Interventions:              Inactivity:  On average, how many days per week do you engage in moderate to strenuous exercise (like a brisk walk)?: 2 days (!) Abnormal  On average, how many minutes do you engage in exercise at this level?: 20 min  Interventions:  Discussed walking--walks a lot                     Objective   Vitals:    03/18/25 1123   BP: 138/71   BP Site: Left Upper Arm   Patient Position: Sitting   Pulse: 76   Resp: 15   Temp: 97.6 °F (36.4 °C)   TempSrc: Temporal   SpO2: 96%   Weight: 58.9 kg (129 lb 12.8 oz)   Height: 1.524 m (5')      Body mass index is 25.35 kg/m².                    Allergies   Allergen Reactions    Tramadol Nausea Only     Prior to Visit Medications    Medication Sig Taking? Authorizing Provider

## 2025-03-17 ENCOUNTER — TELEPHONE (OUTPATIENT)
Age: 75
End: 2025-03-17

## 2025-03-17 NOTE — TELEPHONE ENCOUNTER
Caller requests Refill of:    gabapentin (NEURONTIN) 100 MG capsule     Pt states all out of medication, states was advised that medication was picked up from pharmacy in Feb but states did not get refill.     Please send to:    [facility] [address] [place] [address] - [address] [phone] - F [phone] [address] [facility] [address]  Phone: [phone] Fax: [phone]         Visit / Appointment History:  Future Appointment at [facility]:  3/18/2025   Last Appointment With PCP:  9/18/2024       Caller confirmed instructions and dosages as correct.    Caller was advised that Meds will be refilled as soon as possible, however there can be a 48-72 business hour turn around on refill requests.

## 2025-03-18 ENCOUNTER — OFFICE VISIT (OUTPATIENT)
Age: 75
End: 2025-03-18
Payer: MEDICARE

## 2025-03-18 VITALS
HEART RATE: 76 BPM | DIASTOLIC BLOOD PRESSURE: 71 MMHG | WEIGHT: 129.8 LBS | BODY MASS INDEX: 25.48 KG/M2 | RESPIRATION RATE: 15 BRPM | OXYGEN SATURATION: 96 % | HEIGHT: 60 IN | SYSTOLIC BLOOD PRESSURE: 138 MMHG | TEMPERATURE: 97.6 F

## 2025-03-18 DIAGNOSIS — F51.01 PRIMARY INSOMNIA: ICD-10-CM

## 2025-03-18 DIAGNOSIS — E53.8 B12 DEFICIENCY: ICD-10-CM

## 2025-03-18 DIAGNOSIS — N18.31 STAGE 3A CHRONIC KIDNEY DISEASE (HCC): ICD-10-CM

## 2025-03-18 DIAGNOSIS — F32.89 OTHER SPECIFIED DEPRESSIVE EPISODES: ICD-10-CM

## 2025-03-18 DIAGNOSIS — I10 ESSENTIAL HYPERTENSION: Primary | ICD-10-CM

## 2025-03-18 DIAGNOSIS — Z00.00 MEDICARE ANNUAL WELLNESS VISIT, SUBSEQUENT: ICD-10-CM

## 2025-03-18 DIAGNOSIS — G60.8 IDIOPATHIC SMALL FIBER SENSORY NEUROPATHY: ICD-10-CM

## 2025-03-18 DIAGNOSIS — M85.89 OSTEOPENIA OF MULTIPLE SITES: ICD-10-CM

## 2025-03-18 DIAGNOSIS — R73.01 IFG (IMPAIRED FASTING GLUCOSE): ICD-10-CM

## 2025-03-18 DIAGNOSIS — G62.9 NEUROPATHY: ICD-10-CM

## 2025-03-18 PROBLEM — G63 IMMUNE-MEDIATED NEUROPATHY: Status: RESOLVED | Noted: 2023-04-20 | Resolved: 2025-03-18

## 2025-03-18 PROBLEM — D89.89 IMMUNE-MEDIATED NEUROPATHY: Status: RESOLVED | Noted: 2023-04-20 | Resolved: 2025-03-18

## 2025-03-18 PROBLEM — M47.27 LUMBOSACRAL RADICULOPATHY DUE TO DEGENERATIVE JOINT DISEASE OF SPINE: Status: RESOLVED | Noted: 2023-06-21 | Resolved: 2025-03-18

## 2025-03-18 LAB
ANION GAP SERPL CALC-SCNC: 5 MMOL/L (ref 2–12)
BUN SERPL-MCNC: 20 MG/DL (ref 6–20)
BUN/CREAT SERPL: 22 (ref 12–20)
CALCIUM SERPL-MCNC: 9.7 MG/DL (ref 8.5–10.1)
CHLORIDE SERPL-SCNC: 106 MMOL/L (ref 97–108)
CO2 SERPL-SCNC: 29 MMOL/L (ref 21–32)
CREAT SERPL-MCNC: 0.89 MG/DL (ref 0.55–1.02)
EST. AVERAGE GLUCOSE BLD GHB EST-MCNC: 88 MG/DL
GLUCOSE SERPL-MCNC: 87 MG/DL (ref 65–100)
HBA1C MFR BLD: 4.7 % (ref 4–5.6)
POTASSIUM SERPL-SCNC: 4.2 MMOL/L (ref 3.5–5.1)
SODIUM SERPL-SCNC: 140 MMOL/L (ref 136–145)
TSH SERPL DL<=0.05 MIU/L-ACNC: 1.33 UIU/ML (ref 0.36–3.74)

## 2025-03-18 PROCEDURE — 3075F SYST BP GE 130 - 139MM HG: CPT | Performed by: INTERNAL MEDICINE

## 2025-03-18 PROCEDURE — 1159F MED LIST DOCD IN RCRD: CPT | Performed by: INTERNAL MEDICINE

## 2025-03-18 PROCEDURE — 99214 OFFICE O/P EST MOD 30 MIN: CPT | Performed by: INTERNAL MEDICINE

## 2025-03-18 PROCEDURE — G0439 PPPS, SUBSEQ VISIT: HCPCS | Performed by: INTERNAL MEDICINE

## 2025-03-18 PROCEDURE — 1123F ACP DISCUSS/DSCN MKR DOCD: CPT | Performed by: INTERNAL MEDICINE

## 2025-03-18 PROCEDURE — 3078F DIAST BP <80 MM HG: CPT | Performed by: INTERNAL MEDICINE

## 2025-03-18 PROCEDURE — 1126F AMNT PAIN NOTED NONE PRSNT: CPT | Performed by: INTERNAL MEDICINE

## 2025-03-18 RX ORDER — AMLODIPINE AND BENAZEPRIL HYDROCHLORIDE 10; 20 MG/1; MG/1
1 CAPSULE ORAL DAILY
Qty: 90 CAPSULE | Refills: 0 | Status: SHIPPED | OUTPATIENT
Start: 2025-03-18

## 2025-03-18 RX ORDER — CHLORTHALIDONE 25 MG/1
12.5 TABLET ORAL DAILY
Qty: 45 TABLET | Refills: 1 | Status: SHIPPED | OUTPATIENT
Start: 2025-03-18

## 2025-03-18 RX ORDER — AMITRIPTYLINE HYDROCHLORIDE 10 MG/1
10 TABLET ORAL NIGHTLY
Qty: 90 TABLET | Refills: 1 | Status: SHIPPED | OUTPATIENT
Start: 2025-03-18

## 2025-03-18 SDOH — ECONOMIC STABILITY: FOOD INSECURITY: WITHIN THE PAST 12 MONTHS, THE FOOD YOU BOUGHT JUST DIDN'T LAST AND YOU DIDN'T HAVE MONEY TO GET MORE.: NEVER TRUE

## 2025-03-18 SDOH — ECONOMIC STABILITY: FOOD INSECURITY: WITHIN THE PAST 12 MONTHS, YOU WORRIED THAT YOUR FOOD WOULD RUN OUT BEFORE YOU GOT MONEY TO BUY MORE.: NEVER TRUE

## 2025-03-18 ASSESSMENT — PATIENT HEALTH QUESTIONNAIRE - PHQ9
SUM OF ALL RESPONSES TO PHQ QUESTIONS 1-9: 2
2. FEELING DOWN, DEPRESSED OR HOPELESS: SEVERAL DAYS
SUM OF ALL RESPONSES TO PHQ QUESTIONS 1-9: 2
SUM OF ALL RESPONSES TO PHQ QUESTIONS 1-9: 2
1. LITTLE INTEREST OR PLEASURE IN DOING THINGS: SEVERAL DAYS
SUM OF ALL RESPONSES TO PHQ QUESTIONS 1-9: 2

## 2025-03-18 NOTE — PROGRESS NOTES
\"Have you been to the ER, urgent care clinic since your last visit?  Hospitalized since your last visit?\"    NO    “Have you seen or consulted any other health care providers outside our system since your last visit?”    NO      “Have you had a colorectal cancer screening such as a colonoscopy/FIT/Cologuard?    YES - Type: Colonoscopy - Where: [facility] Nurse/LILLIE to request most recent records if not in the chart     Date of last Colonoscopy: 5/11/2018  No cologuard on file  No FIT/FOBT on file   No flexible sigmoidoscopy on file

## 2025-03-19 ENCOUNTER — RESULTS FOLLOW-UP (OUTPATIENT)
Age: 75
End: 2025-03-19

## 2025-04-16 ENCOUNTER — TELEPHONE (OUTPATIENT)
Age: 75
End: 2025-04-16

## 2025-04-16 NOTE — TELEPHONE ENCOUNTER
Caller requests Refill of:  gabapentin (NEURONTIN) 100 MG capsule     Pt states was unable to get RX on 03/18/25    Please send to:      [facility] - [place], [place] - [address] [facility] [address] - P [phone] - F [phone]  [address] [facility] [address]  Phone: [phone] Fax: [phone]         Visit / Appointment History:  Future Appointment at [facility]:  Visit date not found   Last Appointment With PCP:  3/18/2025       Caller confirmed instructions and dosages as correct.    Caller was advised that Meds will be refilled as soon as possible, however there can be a 48-72 business hour turn around on refill requests.

## 2025-04-17 DIAGNOSIS — G60.8 IDIOPATHIC SMALL FIBER SENSORY NEUROPATHY: ICD-10-CM

## 2025-04-17 RX ORDER — GABAPENTIN 100 MG/1
100 CAPSULE ORAL 3 TIMES DAILY
Qty: 270 CAPSULE | Refills: 0 | Status: SHIPPED | OUTPATIENT
Start: 2025-04-17 | End: 2025-10-14

## 2025-04-18 NOTE — TELEPHONE ENCOUNTER
Pt states that insurance will not pay for script that was just sent in this morning.  They will not pay until 5-14-25     Pt is out of medication and needs to know what to do?    This is for gabapentin   pt did not get the script for #270

## 2025-05-29 ENCOUNTER — OFFICE VISIT (OUTPATIENT)
Age: 75
End: 2025-05-29
Payer: MEDICARE

## 2025-05-29 VITALS
BODY MASS INDEX: 25.52 KG/M2 | WEIGHT: 130 LBS | DIASTOLIC BLOOD PRESSURE: 70 MMHG | TEMPERATURE: 97.7 F | OXYGEN SATURATION: 92 % | HEART RATE: 86 BPM | RESPIRATION RATE: 16 BRPM | HEIGHT: 60 IN | SYSTOLIC BLOOD PRESSURE: 118 MMHG

## 2025-05-29 DIAGNOSIS — G60.8 IDIOPATHIC SMALL FIBER SENSORY NEUROPATHY: ICD-10-CM

## 2025-05-29 DIAGNOSIS — M96.1 CERVICAL POST-LAMINECTOMY SYNDROME: Primary | ICD-10-CM

## 2025-05-29 PROCEDURE — 1159F MED LIST DOCD IN RCRD: CPT

## 2025-05-29 PROCEDURE — 1126F AMNT PAIN NOTED NONE PRSNT: CPT

## 2025-05-29 PROCEDURE — 1160F RVW MEDS BY RX/DR IN RCRD: CPT

## 2025-05-29 PROCEDURE — 3074F SYST BP LT 130 MM HG: CPT

## 2025-05-29 PROCEDURE — 99215 OFFICE O/P EST HI 40 MIN: CPT

## 2025-05-29 PROCEDURE — 3078F DIAST BP <80 MM HG: CPT

## 2025-05-29 PROCEDURE — 1123F ACP DISCUSS/DSCN MKR DOCD: CPT

## 2025-05-29 ASSESSMENT — PATIENT HEALTH QUESTIONNAIRE - PHQ9
SUM OF ALL RESPONSES TO PHQ QUESTIONS 1-9: 1
SUM OF ALL RESPONSES TO PHQ QUESTIONS 1-9: 1
2. FEELING DOWN, DEPRESSED OR HOPELESS: SEVERAL DAYS
SUM OF ALL RESPONSES TO PHQ QUESTIONS 1-9: 1
1. LITTLE INTEREST OR PLEASURE IN DOING THINGS: NOT AT ALL
SUM OF ALL RESPONSES TO PHQ QUESTIONS 1-9: 1

## 2025-05-29 NOTE — PROGRESS NOTES
[facility]  In Office FOLLOW-UP VISIT         Daksha Michael is a 74 y.o. female who presents today for the following:  Chief Complaint   Patient presents with    Back Pain    Peripheral Neuropathy     Had surgery on neck 12/13/2023. States that it would not help the damage that was already but that it would not worsen         ASSESSMENT AND PLAN  Patient is known to this practice.  This is my first time seeing the patient.  Chart and history reviewed in detail at today's office visit.    1. Cervical post-laminectomy syndrome  Assessment & Plan:   The patient's symptoms of hand numbness, tingling, and pain are likely originating from the cervical spine.     She reports that her right hand is worse due to more frequent use.     She is currently taking gabapentin 100 mg three times a day and amitriptyline 10 mg at night. The amitriptyline dosage will be increased to 25 mg at night for symptoms management.     A referral for physical therapy specifically targeting the hands and arms will be made to address the tingling, pain, and numbness.     She is advised to continue using stress balls for hand exercises during downtime.    Will continue to monitor patient for this.  If her symptoms persist or worsen, may consider repeating EMG of the bilateral upper extremity for further diagnostic benefits.    Orders:  -     amitriptyline (ELAVIL) 25 MG tablet; Take 1 tablet by mouth nightly, Disp-90 tablet, R-3Normal  -     Amb External Referral To Physical Therapy  2. Idiopathic small fiber sensory neuropathy  Assessment & Plan:   Her symptoms persist    Patient reports numbness in her legs and feet. This symptom is also likely related to her spinal issues, and possible small fiber neuropathy.     She is currently on gabapentin 100 mg 3 times daily and amitriptyline 10 mg nightly.    She is to continue gabapentin 100 mg 3 times daily and amitriptyline  will be increased to 25 mg

## 2025-05-29 NOTE — PATIENT INSTRUCTIONS
As per our discussion,    Given you continue to experience numbness, tingling, and pain in your hands, I will continue gabapentin 100 mg 3 times daily but I will increase amitriptyline slightly to 25 mg at bedtime to see if that helps.    Will also refer you to PT for evaluation.    I would encourage that you continue to follow-up with your primary care provider and your other specialist for other comorbid condition as appropriate.    Continue to remain active.  Adult healthy lifestyles which include nutrition and exercise.  Please take your time when you are walking or when you are switching position to prevent falls.    It was a pleasure to meet you today    Will see you back in 8 months or sooner if needed    Please do not hesitate to reach out for any questions or concerns.

## 2025-05-30 ENCOUNTER — CLINICAL DOCUMENTATION (OUTPATIENT)
Age: 75
End: 2025-05-30

## 2025-05-30 ASSESSMENT — ENCOUNTER SYMPTOMS
EYES NEGATIVE: 1
ALLERGIC/IMMUNOLOGIC NEGATIVE: 1
RESPIRATORY NEGATIVE: 1
GASTROINTESTINAL NEGATIVE: 1

## 2025-05-30 NOTE — ASSESSMENT & PLAN NOTE
Her symptoms persist    Patient reports numbness in her legs and feet. This symptom is also likely related to her spinal issues, and possible small fiber neuropathy.     She is currently on gabapentin 100 mg 3 times daily and amitriptyline 10 mg nightly.    She is to continue gabapentin 100 mg 3 times daily and amitriptyline  will be increased to 25 mg nightly to help with symptoms management and sleep.     Will refer her to physical therapy which may also help alleviate some of these symptoms.    Fall safety was discussed with patient.

## 2025-05-30 NOTE — PROGRESS NOTES
Faxed referral and last office note to [facility] at [phone] and received fax confirmation. Placed confirmation in fast scan.

## 2025-05-30 NOTE — ASSESSMENT & PLAN NOTE
The patient's symptoms of hand numbness, tingling, and pain are likely originating from the cervical spine.     She reports that her right hand is worse due to more frequent use.     She is currently taking gabapentin 100 mg three times a day and amitriptyline 10 mg at night. The amitriptyline dosage will be increased to 25 mg at night for symptoms management.     A referral for physical therapy specifically targeting the hands and arms will be made to address the tingling, pain, and numbness.     She is advised to continue using stress balls for hand exercises during downtime.    Will continue to monitor patient for this.  If her symptoms persist or worsen, may consider repeating EMG of the bilateral upper extremity for further diagnostic benefits.

## 2025-06-23 DIAGNOSIS — I10 ESSENTIAL HYPERTENSION: ICD-10-CM

## 2025-06-24 RX ORDER — AMLODIPINE AND BENAZEPRIL HYDROCHLORIDE 10; 20 MG/1; MG/1
1 CAPSULE ORAL DAILY
Qty: 90 CAPSULE | Refills: 0 | Status: SHIPPED | OUTPATIENT
Start: 2025-06-24

## 2025-07-07 DIAGNOSIS — G60.8 IDIOPATHIC SMALL FIBER SENSORY NEUROPATHY: ICD-10-CM

## 2025-07-07 RX ORDER — GABAPENTIN 100 MG/1
100 CAPSULE ORAL 3 TIMES DAILY
Qty: 270 CAPSULE | Refills: 0 | Status: SHIPPED | OUTPATIENT
Start: 2025-07-07 | End: 2026-01-03

## 2025-07-07 NOTE — TELEPHONE ENCOUNTER
Caller requests Refill of:  gabapentin (NEURONTIN) 100 MG capsule     Please send to:    [facility] - [place], [place] - [address] [facility] [address] - P [phone] - F [phone]  [address] [facility] [address]  Phone: [phone] Fax: [phone]         Visit / Appointment History:  Future Appointment at [facility]:  9/24/2025   Last Appointment With PCP:  3/18/2025       Caller confirmed instructions and dosages as correct.    Caller was advised that Meds will be refilled as soon as possible, however there can be a 48-72 business hour turn around on refill requests.

## 2025-07-07 NOTE — TELEPHONE ENCOUNTER
Future Appointments:  Future Appointments   Date Time Provider Department Center   9/24/2025  1:15 PM Rin Calle MD [facility] BS E[facility] DEP        Last Appointment With Me:  3/18/2025     Requested Prescriptions     Pending Prescriptions Disp Refills    gabapentin (NEURONTIN) 100 MG capsule 270 capsule 0     Sig: Take 1 capsule by mouth 3 times daily for 180 days. Max Daily Amount: 300 mg